# Patient Record
Sex: FEMALE | Race: WHITE | HISPANIC OR LATINO | Employment: FULL TIME | URBAN - METROPOLITAN AREA
[De-identification: names, ages, dates, MRNs, and addresses within clinical notes are randomized per-mention and may not be internally consistent; named-entity substitution may affect disease eponyms.]

---
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
## 2018-01-10 DIAGNOSIS — Z30.41 ENCOUNTER FOR SURVEILLANCE OF CONTRACEPTIVE PILLS: ICD-10-CM

## 2018-01-10 RX ORDER — NORGESTIMATE AND ETHINYL ESTRADIOL 0.25-0.035
KIT ORAL
Qty: 28 TABLET | Refills: 0 | Status: SHIPPED | OUTPATIENT
Start: 2018-01-10 | End: 2018-03-20

## 2018-01-10 NOTE — TELEPHONE ENCOUNTER
"Requested Prescriptions   Pending Prescriptions Disp Refills     SPRINTEC 28 0.25-35 MG-MCG per tablet [Pharmacy Med Name: SPRINTEC 28 DAY TABLET]   84 tablet 3    Last Written Prescription Date:  1.15.17  Last Fill Quantity: 84 tablet,  # refills: 3   [facility] Office Visit with [facility] prescribing provider:  2.15.17   Future Office Visit:      Sig: TAKE 1 TABLET BY MOUTH DAILY    Contraceptives Protocol Passed    1/10/2018  1:11 AM       Passed - Patient is not a current smoker if age is 35 or older       Passed - Recent or future visit with authorizing provider's specialty    Patient had office visit in the last year or has a visit in the next 30 days with authorizing provider.  See \"Patient Info\" tab in inbasket, or \"Choose Columns\" in Meds & Orders section of the refill encounter.              Passed - No active pregnancy on record       Passed - No positive pregnancy test in past 12 months          "

## 2018-03-30 ENCOUNTER — OFFICE VISIT (OUTPATIENT)
Dept: FAMILY MEDICINE | Facility: CLINIC | Age: 18
End: 2018-03-30
Payer: COMMERCIAL

## 2018-03-30 VITALS
WEIGHT: 175 LBS | HEIGHT: 64 IN | HEART RATE: 105 BPM | TEMPERATURE: 98.6 F | BODY MASS INDEX: 29.88 KG/M2 | SYSTOLIC BLOOD PRESSURE: 114 MMHG | OXYGEN SATURATION: 98 % | DIASTOLIC BLOOD PRESSURE: 66 MMHG

## 2018-03-30 DIAGNOSIS — H53.8 BLURRED VISION: ICD-10-CM

## 2018-03-30 DIAGNOSIS — Z30.41 ENCOUNTER FOR SURVEILLANCE OF CONTRACEPTIVE PILLS: Primary | ICD-10-CM

## 2018-03-30 DIAGNOSIS — B36.0 TINEA VERSICOLOR: ICD-10-CM

## 2018-03-30 DIAGNOSIS — L81.9 CHANGING PIGMENTED SKIN LESION: ICD-10-CM

## 2018-03-30 LAB
GLUCOSE BLD-MCNC: 95 MG/DL (ref 70–99)
KOH PREP SPEC: NORMAL
SPECIMEN SOURCE: NORMAL

## 2018-03-30 PROCEDURE — 87220 TISSUE EXAM FOR FUNGI: CPT | Performed by: PHYSICIAN ASSISTANT

## 2018-03-30 PROCEDURE — 99214 OFFICE O/P EST MOD 30 MIN: CPT | Performed by: PHYSICIAN ASSISTANT

## 2018-03-30 PROCEDURE — 82947 ASSAY GLUCOSE BLOOD QUANT: CPT | Performed by: PHYSICIAN ASSISTANT

## 2018-03-30 PROCEDURE — 36415 COLL VENOUS BLD VENIPUNCTURE: CPT | Performed by: PHYSICIAN ASSISTANT

## 2018-03-30 RX ORDER — NORGESTIMATE AND ETHINYL ESTRADIOL 0.25-0.035
1 KIT ORAL DAILY
Qty: 84 TABLET | Refills: 3 | Status: SHIPPED | OUTPATIENT
Start: 2018-03-30 | End: 2019-02-17

## 2018-03-30 RX ORDER — KETOCONAZOLE 20 MG/G
CREAM TOPICAL DAILY
Qty: 15 G | Refills: 1 | Status: SHIPPED | OUTPATIENT
Start: 2018-03-30 | End: 2018-08-07

## 2018-03-30 NOTE — MR AVS SNAPSHOT
After Visit Summary   3/30/2018    Phoebe Morris    MRN: [id number]           Patient Information     Date Of Birth          2000        Visit Information        Provider Department      3/30/2018 8:20 AM Samantha Boyd PA-C [facility]        Today's Diagnoses     Encounter for surveillance of contraceptive pills    -  1    Blurred vision          Care Instructions    Please followup annually for medication check and a routine physical.  Be seen sooner if needed.            Follow-ups after your visit        Follow-up notes from your care team     Return in about 1 year (around 3/30/2019) for medication re-check.      Your next 10 appointments already scheduled     Apr 12, 2018  3:15 PM CDT   Office Visit with Ashley Tinoco, BERNICE [facility] ([facility])    [address]  [address]  [place] [address] [phone]           Bring a current list of meds and any records pertaining to this visit. For Physicals, please bring immunization records and any forms needing to be filled out. Please arrive 10 minutes early to complete paperwork.              Who to contact     If you have questions or need follow up information about today's clinic visit or your schedule please contact [facility] directly at [phone].  Normal or non-critical lab and imaging results will be communicated to you by MyChart, letter or phone within 4 business days after the clinic has received the results. If you do not hear from us within 7 days, please contact the clinic through MyChart or phone. If you have a critical or abnormal lab result, we will notify you by phone as soon as possible.  Submit refill requests through [employer] or call your pharmacy and they will forward the refill request to us. Please allow 3 business days for your refill to be completed.          Additional Information About Your Visit        MyChart  "Information     Dean gives you secure access to your electronic health record. If you see a primary care provider, you can also send messages to your care team and make appointments. If you have questions, please call your primary care clinic.  If you do not have a primary care provider, please call [phone] and they will assist you.        Care EveryWhere ID     This is your Care EveryWhere ID. This could be used by other organizations to access your [facility] medical records  [id number]        Your Vitals Were     Pulse Temperature Height Last Period Pulse Oximetry Breastfeeding?    105 98.6  F (37  C) (Oral) 5' 4\" (1.626 m) 03/20/2018 (Exact Date) 98% No    BMI (Body Mass Index)                   30.04 kg/m2            Blood Pressure from Last 3 Encounters:   03/30/18 114/66   12/07/17 110/62   02/15/17 116/62    Weight from Last 3 Encounters:   03/30/18 175 lb (79.4 kg) (94 %)*   12/07/17 186 lb (84.4 kg) (96 %)*   02/15/17 179 lb (81.2 kg) (95 %)*     * Growth percentiles are based on [facility] 2-20 Years data.              We Performed the Following     Glucose, whole blood          Where to get your medicines      These medications were sent to [facility] [id number] [place] [facility] [address]  [address], [place] [address]     Phone:  [phone]     norgestimate-ethinyl estradiol 0.25-35 MG-MCG per tablet          Primary Care Provider Office Phone # Fax #    Samantha Boyd PA-C [phone] [address]   [address]  [facility] [address]        Equal Access to Services     [facility]: Hadilan Hansen, tavon kyle, qaybbaudilio tomalmasam daly, dante bryant. So [facility] [phone].    ATENCIÓN: Si habla español, tiene a wilson disposición servicios gratuitos de asistencia lingüística. Llame al [phone].    We comply with applicable federal civil rights laws and [place] laws. We do not discriminate on the basis of race, " color, national origin, age, disability, sex, sexual orientation, or gender identity.            Thank you!     Thank you for choosing [facility]  for your care. Our goal is always to provide you with excellent care. Hearing back from our patients is one way we can continue to improve our services. Please take a few minutes to complete the written survey that you may receive in the mail after your visit with us. Thank you!             Your Updated Medication List - Protect others around you: Learn how to safely use, store and throw away your medicines at www.disposemymeds.org.          This list is accurate as of 3/30/18  8:58 AM.  Always use your most recent med list.                   Brand Name Dispense Instructions for use Diagnosis    norgestimate-ethinyl estradiol 0.25-35 MG-MCG per tablet    SPRINTEC 28    84 tablet    Take 1 tablet by mouth daily    Encounter for surveillance of contraceptive pills

## 2018-03-30 NOTE — PATIENT INSTRUCTIONS
Please followup annually for medication check and a routine physical.  Be seen sooner if needed.

## 2018-03-30 NOTE — NURSING NOTE
"Chief Complaint   Patient presents with     Recheck Medication       Initial /66 (BP Location: Right arm, Patient Position: Chair, Cuff Size: Adult Large)  Pulse 105  Temp 98.6  F (37  C) (Oral)  Ht 5' 4\" (1.626 m)  Wt 175 lb (79.4 kg)  LMP 03/20/2018 (Exact Date)  SpO2 98%  Breastfeeding? No  BMI 30.04 kg/m2 Estimated body mass index is 30.04 kg/(m^2) as calculated from the following:    Height as of this encounter: 5' 4\" (1.626 m).    Weight as of this encounter: 175 lb (79.4 kg).  Medication Reconciliation: complete   Csaba Mlnarik CMA    "

## 2018-03-30 NOTE — PROGRESS NOTES
"  SUBJECTIVE:                                                    Phoebe Morris is a 17 year old female who presents to clinic today for the following health issues:      Medication Followup of Sprintec    Taking Medication as prescribed: yes    Side Effects:  None    Medication Helping Symptoms:  Yes - cramps are better  Last menses she was very emotional - crying over anything. Did have added stress with school.    x2 weeks - Has blurred vision off and on happens in class and did happen driving yesterday.   Happens when wearing contacts or glasses. Last eye exam 07/2017. Prescription is .5.  Has to close eyes and refocus.   Wants glucose checked - father and sister have diabetes.  Has concussion 11/2016    Patient would like her glucose checked, as her dad and sister have diabetes.  Also, she has had some trouble with transient blurry vision.  She does wear contacts and glasses and reports that the blurriness improves with a lot of blinking.  She is scheduled to see the eye doctor tomorrow at noon.      She is here for a birth control renewal.  She says she is doing well on the pill and does not have concerns.  She denies getting migraines with auras.  She is not a cigarette smoker or tobacco/nicoteine user.         Problem list and histories reviewed & adjusted, as indicated.  Additional history: as documented      ROS:  Constitutional, HEENT, cardiovascular, pulmonary, GI, , musculoskeletal, neuro, skin, endocrine and psych systems are negative, except as otherwise noted.    OBJECTIVE:                                                    /66 (BP Location: Right arm, Patient Position: Chair, Cuff Size: Adult Large)  Pulse 105  Temp 98.6  F (37  C) (Oral)  Ht 5' 4\" (1.626 m)  Wt 175 lb (79.4 kg)  LMP 03/20/2018 (Exact Date)  SpO2 98%  Breastfeeding? No  BMI 30.04 kg/m2  Body mass index is 30.04 kg/(m^2).  GENERAL: healthy, alert and no distress  EYES: Eyes grossly normal to inspection, PERRL and " conjunctivae and sclerae normal  RESP: lungs clear to auscultation - no rales, rhonchi or wheezes  BREAST: normal without masses, tenderness or nipple discharge and no palpable axillary masses or adenopathy  CV: regular rate and rhythm, normal S1 S2, no S3 or S4, no murmur, click or rub, no peripheral edema and peripheral pulses strong  MS: no gross musculoskeletal defects noted, no edema  NEURO: Normal strength and tone, mentation intact and speech normal  PSYCH: mentation appears normal, affect normal/bright  SKIN:  Hypopigmented lesions on the center chest between breasts.      Diagnostic Test Results:  Fasting Glucose - Pending     ASSESSMENT/PLAN:                                                      Phoebe was seen today for recheck medication.    Diagnoses and all orders for this visit:    Encounter for surveillance of contraceptive pills  -     norgestimate-ethinyl estradiol (SPRINTEC 28) 0.25-35 MG-MCG per tablet; Take 1 tablet by mouth daily    Blurred vision  -     Glucose, whole blood    Changing pigmented skin lesion  -     KOH prep (skin, hair or nails only)    Tinea versicolor  -     ketoconazole (NIZORAL) 2 % cream; Apply topically daily      - Patient is doing well on the birth control.  Will continue that as prescribed.   - She will see the eye doctor tomorrow as scheduled.    - Patient has been advised to try re-wetting drops such as Thera tears as the blurriness may be from eye dryness, as she reports it improves with a lot of blinking.    - Will have patient try Ketoconazole cream for the treatment of Tinea Versicolor.      -- I have discussed the patient's diagnosis, and my plan of treatment with the patient and/or family. Patient is aware to followup if symptoms do not improve.  Patient has been advised to be seen sooner or seek more immediate care if symptoms change or worsen.  Patient agrees with and understands the plan today.     See Patient Instructions:  Please followup annually for  medication check and a routine physical.  Be seen sooner if needed.          Samantha Boyd PA-C    [facility] [place]

## 2018-04-02 NOTE — PROGRESS NOTES
Nima Sandhu ,    The results from your recent lab work are within normal limits.    -Glucose (diabetic screening test) is normal.      Thank you for choosing [facility] for your health care needs,      Samantha Boyd PA-C

## 2018-04-12 ENCOUNTER — OFFICE VISIT (OUTPATIENT)
Dept: FAMILY MEDICINE | Facility: CLINIC | Age: 18
End: 2018-04-12
Payer: COMMERCIAL

## 2018-04-12 VITALS — BODY MASS INDEX: 30.4 KG/M2 | TEMPERATURE: 98.4 F | WEIGHT: 177.1 LBS

## 2018-04-12 DIAGNOSIS — Z71.84 TRAVEL ADVICE ENCOUNTER: Primary | ICD-10-CM

## 2018-04-12 DIAGNOSIS — Z23 NEED FOR VACCINATION: ICD-10-CM

## 2018-04-12 PROCEDURE — 99403 PREV MED CNSL INDIV APPRX 45: CPT | Mod: 25 | Performed by: NURSE PRACTITIONER

## 2018-04-12 PROCEDURE — 90691 TYPHOID VACCINE IM: CPT | Mod: GA | Performed by: NURSE PRACTITIONER

## 2018-04-12 PROCEDURE — 90717 YELLOW FEVER VACCINE SUBQ: CPT | Mod: GA | Performed by: NURSE PRACTITIONER

## 2018-04-12 PROCEDURE — 90472 IMMUNIZATION ADMIN EACH ADD: CPT | Mod: GA | Performed by: NURSE PRACTITIONER

## 2018-04-12 PROCEDURE — 90471 IMMUNIZATION ADMIN: CPT | Mod: GA | Performed by: NURSE PRACTITIONER

## 2018-04-12 RX ORDER — ACETAZOLAMIDE 125 MG/1
TABLET ORAL
Qty: 20 TABLET | Refills: 0 | Status: SHIPPED | OUTPATIENT
Start: 2018-04-12 | End: 2018-08-07

## 2018-04-12 RX ORDER — ATOVAQUONE AND PROGUANIL HYDROCHLORIDE 250; 100 MG/1; MG/1
1 TABLET, FILM COATED ORAL DAILY
Qty: 15 TABLET | Refills: 0 | Status: SHIPPED | OUTPATIENT
Start: 2018-04-12 | End: 2018-08-07

## 2018-04-12 RX ORDER — AZITHROMYCIN 500 MG/1
500 TABLET, FILM COATED ORAL DAILY
Qty: 3 TABLET | Refills: 0 | Status: SHIPPED | OUTPATIENT
Start: 2018-04-12 | End: 2018-04-15

## 2018-04-12 NOTE — PROGRESS NOTES
Nurse Note      Itinerary:  [place]      Departure Date: 5/5/2018      Return Date: 5/19/2018      Length of Trip 2 weeks      Reason for Travel: School trip           Urban or rural: Both      Accommodations: Hotel        IMMUNIZATION HISTORY  Have you received any immunizations within the past 4 weeks?  No  Have you ever fainted from having your blood drawn or from an injection?  No  Have you ever had a fever reaction to vaccination?  No  Have you ever had any bad reaction or side effect from any vaccination?  No  Have you ever had hepatitis A or B vaccine?  Yes  Do you live (or work closely) with anyone who has AIDS, an AIDS-like condition, any other immune disorder or who is on chemotherapy for cancer?  No  Do you have a family history of immunodeficiency?  No  Have you received any injection of immune globulin or any blood products during the past 12 months?  No    Patient roomed by KARLA Garces  Phoebe Morris is a 17 year old female seen today alone for counsultation for international travel to [place] for Study abroad.  Patient will be departing in  3 week(s) and staying for   2 week(s) and  traveling with school group.  ([facility] )    Patient itinerary :  will begin in [facility] [place] 5th - May 8th) > [place] 5/8 -9-> [place] > 05/11/ [facility] for 3 days 05/13/18 [place] 05/14 [place] > [place] 05/14/-05/17 > [place]  1 day > [place]  region of [place] which presents risk for Malaria, Yellow Fever, Dengue Fever, Chikungungya, Zika, Schistosomiasis, Rabies, food borne illnesses, motor vehicle accidents and Typhoid. exposure.      Patient's activities will include sightseeing, jungle and high altitude exposure.    Patient's country of birth is [place]    Special medical concerns: none  Pre-travel questionnaire was completed by patient and reviewed by provider.     Vitals: Temp 98.4  F (36.9  C) (Oral)  Wt 177 lb 1.6 oz (80.3 kg)  LMP 03/20/2018 (Exact Date)  Breastfeeding? No  BMI 30.4 kg/m2  BMI= Body mass  index is 30.4 kg/(m^2).    EXAM:  General:  Well-nourished, well-developed in no acute distress.  Appears to be stated age, interacts appropriately and expresses understanding of information given to patient.    Current Outpatient Prescriptions   Medication Sig Dispense Refill     norgestimate-ethinyl estradiol (SPRINTEC 28) 0.25-35 MG-MCG per tablet Take 1 tablet by mouth daily 84 tablet 3     ketoconazole (NIZORAL) 2 % cream Apply topically daily 15 g 1     Patient Active Problem List   Diagnosis   (none) - all problems resolved or deleted     No Known Allergies      Immunizations discussed include:   Hepatitis A:  Up to date  Hepatitis B: Up to date  Influenza: Up to date  Typhoid: Ordered/given today, risks, benefits and side effects reviewed  Rabies: Declined  reviewed managment of a animal bite or scratch (washing wound, seek medical care within 24 hours for post exposure prophylaxis )  Yellow Fever: Stamaril Ordered/given today - consent completed, side effects, precautions, allergies, risks discussed. Patient expressed understanding.  Frisian Encephalitis: Not indicated  Meningococcus: Up to date  Tetanus/Diphtheria: Up to date  Measles/Mumps/Rubella: Up to date  Cholera: Not needed  Polio: Up to date  Pneumococcal: Under age of 65  Varicella: Up to date  Zostavax:  Not indicated  Shingrix: Not indicated  HPV:  Up to date  TB:  Low risk    Stamaril Informed Consent    The patient was provided with a copy of the IRB-approved consent form and all questions were answered before the patient agreed to participate by signing the informed consent document.   A copy of the form was provided to the patient.    Date: April 12, 2018    Consent Version Date: 5/10/2017  Consent Obtained by:  Ashley Tinoco CNP (Lori)     HIPAA:  Yes  HIPAA Authorization Signed Date: April 12, 2018        Inclusion/Exclusion Criteria:    (Similar to Yellow Fever-VAX)      The patient met all of the following inclusion criteria in order to  be eligible for the Stamaril vaccination under this EAP (Expanded Access Investigational New Drug Program)           At increased risk for YF, including researchers, laboratory workers, vaccine production staff, and those who are traveling within 30 days to a YF-endemic region or to a country requiring proof of YF vaccination under IHRs (International Health Regulations)?       Yes     Patient is greater than or equal to 9 months of age on the day of vaccination?     Yes     Patient is greater than or equal to 18 years of age and signed and dated the Consent Forms?     Yes     Patient is < 18 years of age and parent(s)/guardian(s) signed and dated the Consent Forms?      Patient is 7 years to < 18 years of age and signed and dated the Assent form?        No Assent is required.  Patient is <7 years of age.     No      No      N/A     The patient did not meet any of the following criteria that would have excluded the patient from receiving the Stamaril vaccination under this EAP              Patient is less than 9 months of age.       No     The patient is breast-feeding and cannot stop nursing for at least 14 days after vaccination.    Note: Yellow Fever vaccine virus may be transmissible via breast milk by nursing mothers who are vaccinated during the final 2 weeks of pregnancy or post-partum.   Following transmission, infants may develop encephalitis.  The minimum time of discontinuation of breastfeeding for 14 days after vaccination is based on the expected clearance of live-attenuated vaccine virus.       No     The patient is immunosuppressed, whether congenital or idiopathic, including for example, leukemia, lymphoma, other malignancies, and patients who are receiving immunosuppressant medications (e.g. Systemic corticosteroids [greater than the standard dose of topical or inhaled steroids], alkylating drugs, antimetabolites, of other cytotoxic or immunomodulatory drugs) or radiation therapy or organ  transplantation.       No     The patient has known hypersensitivity to the active substance or to any of the excipients of Stamaril vaccine or to eggs or chicken proteins.     No     The patient is symptomatic for human immunodeficiency virus (HIV) infection     No     The patient is asymptomatic for HIV infection but accompanied by evidence of severe immune suppression    Note:  Evidence of severe immune suppression includes CD4+ T-cell counts < 200 cubic millimeters (or < 15% total lymphocytes in children aged < 6 years), or as determined by the health care provider.       No     The patient has a history of thymus dysfunction (including myasthenia gravis, thymoma, thymectomy)     No     Moderate or severe febrile illness or acute illness    Note: Participation in the EAP can be reassessed when moderate or severe febrile illness or acute illness has resolved.       No         Altitude Exposure on this trip: yes  Past tolerance to Altitude: limited    ASSESSMENT/PLAN:    ICD-10-CM    1. Travel advice encounter Z71.89 TYPHOID VACCINE, IM     C YELLOW FEVER IMMUNIZATION, LIVE, SQ (STAMARIL)     atovaquone-proguanil (MALARONE) 250-100 MG per tablet     azithromycin (ZITHROMAX) 500 MG tablet     acetaZOLAMIDE (DIAMOX) 125 MG tablet   2. Need for vaccination Z23 TYPHOID VACCINE, IM     C YELLOW FEVER IMMUNIZATION, LIVE, SQ (STAMARIL)     I have reviewed general recommendations for safe travel   including: food/water precautions, insect precautions, safer sex   practices given high prevalence of Zika, HIV and other STDs,   roadway safety. Educational materials and [employer] report provided.    Malaraia prophylaxis recommended: Malarone  Symptomatic treatment for traveler's diarrhea: azithromycin  Altitude illness prevention and treatment: Diamox prescription given with instructions on use and education provided on Acute altitude illness recognition and prevention.      Evacuation insurance advised and resources were  provided to patient.    Total visit time 45 minutes  with over 50% of time spent counseling patient as detailed above.    Ashley Tinoco CNP

## 2018-04-12 NOTE — PATIENT INSTRUCTIONS
Today April 12, 2018 you received the    Yellow Fever (YF)    Typhoid - injectable. This vaccine is valid for two years.   .    These appointments can be made as a NURSE ONLY visit.    **It is very important for the vaccinations to be given on the scheduled day(s), this helps ensure you receive the full effectiveness of the vaccine.**    Please call [facility] with any questions [phone]    Thank you for visiting [facility]

## 2018-04-12 NOTE — MR AVS SNAPSHOT
After Visit Summary   4/12/2018    Phoebe Morris    MRN: [id number]           Patient Information     Date Of Birth          2000        Visit Information        Provider Department      4/12/2018 3:15 PM Ashley Tinoco APRN [facility]        Todays Diagnoses     Travel advice encounter    -  1    Need for vaccination          Care Instructions    Today April 12, 2018 you received the    Yellow Fever (YF)    Typhoid - injectable. This vaccine is valid for two years.   .    These appointments can be made as a NURSE ONLY visit.    **It is very important for the vaccinations to be given on the scheduled day(s), this helps ensure you receive the full effectiveness of the vaccine.**    Please call [facility] with any questions [phone]    Thank you for visiting [facility]              Follow-ups after your visit        Who to contact     If you have questions or need follow up information about [facility]'s clinic visit or your schedule please contact [facility] directly at [phone].  Normal or non-critical lab and imaging results will be communicated to you by [employer], letter or phone within 4 business days after the clinic has received the results. If you do not hear from us within 7 days, please contact the clinic through [employer] or phone. If you have a critical or abnormal lab result, we will notify you by phone as soon as possible.  Submit refill requests through [employer] or call your pharmacy and they will forward the refill request to us. Please allow 3 business days for your refill to be completed.          Additional Information About Your Visit        [employer] Information     [employer] gives you secure access to your electronic health record. If you see a primary care provider, you can also send messages to your care team and make appointments. If you have questions, please call your primary care clinic.  If you do  not have a primary care provider, please call [phone] and they will assist you.        Care EveryWhere ID     This is your Care EveryWhere ID. This could be used by other organizations to access your [place] medical records  [id number]        Your Vitals Were     Temperature Last Period Breastfeeding? BMI (Body Mass Index)          98.4  F (36.9  C) (Oral) 03/20/2018 (Exact Date) No 30.4 kg/m2         Blood Pressure from Last 3 Encounters:   03/30/18 114/66   12/07/17 110/62   02/15/17 116/62    Weight from Last 3 Encounters:   04/12/18 177 lb 1.6 oz (80.3 kg) (94 %)*   03/30/18 175 lb (79.4 kg) (94 %)*   12/07/17 186 lb (84.4 kg) (96 %)*     * Growth percentiles are based on [facility] 2-20 Years data.              We Performed the Following     C YELLOW FEVER IMMUNIZATION, LIVE, SQ (STAMARIL)     TYPHOID VACCINE, IM          Today's Medication Changes          These changes are accurate as of 4/12/18  4:06 PM.  If you have any questions, ask your nurse or doctor.               Start taking these medicines.        Dose/Directions    acetaZOLAMIDE 125 MG tablet   Commonly known as:  DIAMOX   Used for:  Travel advice encounter   Started by:  Ashley Tinoco APRN CNP        Take one to two tab every 12 hours starting 24 hours prior to [place]  and continue for days   Quantity:  20 tablet   Refills:  0       atovaquone-proguanil 250-100 MG per tablet   Commonly known as:  MALARONE   Used for:  Travel advice encounter   Started by:  Ashley Tinoco APRN CNP        Dose:  1 tablet   Take 1 tablet by mouth daily Start 2 days before exposure to Malaria and continue daily till  7 days after exposure.   Quantity:  15 tablet   Refills:  0       azithromycin 500 MG tablet   Commonly known as:  ZITHROMAX   Used for:  Travel advice encounter   Started by:  Ashley Tinoco APRN CNP        Dose:  500 mg   Take 1 tablet (500 mg) by mouth daily for 3 doses Take 1 tablet a day for up to 3 days for severe diarrhea   Quantity:   3 tablet   Refills:  0            Where to get your medicines      These medications were sent to [id number] - [place], [place] - [address]  [address], [place] [address]     Phone:  [phone]     acetaZOLAMIDE 125 MG tablet    atovaquone-proguanil 250-100 MG per tablet    azithromycin 500 MG tablet                Primary Care Provider Office Phone # Fax #    Samantha Boyd PA-C [phone]       [address] [place] [address]   0  [facility] [address]        Equal Access to Services     [facility]: Hadii aad ku hadasho Soomaali, waaxda luqadaha, qaybta kaalmada adeegyada, dante sierra . So [facility] [phone].    ATENCIÓN: Si habla español, tiene a wilson disposición servicios gratuitos de asistencia lingüística. [facility] [phone].    We comply with applicable federal civil rights laws and [place] laws. We do not discriminate on the basis of race, color, national origin, age, disability, sex, sexual orientation, or gender identity.            Thank you!     Thank you for choosing [facility]  for your care. Our goal is always to provide you with excellent care. Hearing back from our patients is one way we can continue to improve our services. Please take a few minutes to complete the written survey that you may receive in the mail after your visit with us. Thank you!             Your Updated Medication List - Protect others around you: Learn how to safely use, store and throw away your medicines at www.disposemymeds.org.          This list is accurate as of 4/12/18  4:06 PM.  Always use your most recent med list.                   Brand Name Dispense Instructions for use Diagnosis    acetaZOLAMIDE 125 MG tablet    DIAMOX    20 tablet    Take one to two tab every 12 hours starting 24 hours prior to [place]  and continue for days    Travel advice encounter       atovaquone-proguanil 250-100 MG per tablet    MALARONE    15 tablet    Take 1  tablet by mouth daily Start 2 days before exposure to Malaria and continue daily till  7 days after exposure.    Travel advice encounter       azithromycin 500 MG tablet    ZITHROMAX    3 tablet    Take 1 tablet (500 mg) by mouth daily for 3 doses Take 1 tablet a day for up to 3 days for severe diarrhea    Travel advice encounter       ketoconazole 2 % cream    NIZORAL    15 g    Apply topically daily    Tinea versicolor       norgestimate-ethinyl estradiol 0.25-35 MG-MCG per tablet    SPRINTEC 28    84 tablet    Take 1 tablet by mouth daily    Encounter for surveillance of contraceptive pills

## 2018-08-07 ENCOUNTER — OFFICE VISIT (OUTPATIENT)
Dept: FAMILY MEDICINE | Facility: CLINIC | Age: 18
End: 2018-08-07
Payer: COMMERCIAL

## 2018-08-07 ENCOUNTER — TELEPHONE (OUTPATIENT)
Dept: FAMILY MEDICINE | Facility: CLINIC | Age: 18
End: 2018-08-07

## 2018-08-07 VITALS
OXYGEN SATURATION: 99 % | WEIGHT: 173 LBS | DIASTOLIC BLOOD PRESSURE: 70 MMHG | SYSTOLIC BLOOD PRESSURE: 118 MMHG | HEIGHT: 64 IN | HEART RATE: 91 BPM | BODY MASS INDEX: 29.53 KG/M2 | TEMPERATURE: 98 F

## 2018-08-07 DIAGNOSIS — Z00.00 ROUTINE GENERAL MEDICAL EXAMINATION AT A HEALTH CARE FACILITY: Primary | ICD-10-CM

## 2018-08-07 PROCEDURE — 99395 PREV VISIT EST AGE 18-39: CPT | Performed by: PHYSICIAN ASSISTANT

## 2018-08-07 NOTE — PROGRESS NOTES
SUBJECTIVE:   CC: Phoebe Morris is an 17 year old woman who presents for preventive health visit.     Healthy Habits:    Do you get at least three servings of calcium containing foods daily (dairy, green leafy vegetables, etc.)? yes    Amount of exercise or daily activities, outside of work: 7 day(s) per week - 3 days a week with  other days at least 30 minutes    Problems taking medications regularly No    Medication side effects: No    Have you had an eye exam in the past two years? yes    Do you see a dentist twice per year? yes    Do you have sleep apnea, excessive snoring or daytime drowsiness?no      PROBLEMS TO ADD ON...  Patient needs paperwork filled out for [employer].    Needs to have a body fat measurement done as she exceeds the BMI weight requirments listed on the form.      Today's PHQ-2 Score: 0-0  PHQ-2 ( 1999 Pfizer) 12/7/2017   Q1: Little interest or pleasure in doing things 0   Q2: Feeling down, depressed or hopeless 0   PHQ-2 Score 0       Abuse: Current or Past(Physical, Sexual or Emotional)- No  Do you feel safe in your environment - Yes    Social History   Substance Use Topics     Smoking status: Never Smoker     Smokeless tobacco: Never Used     Alcohol use No     If you drink alcohol do you typically have >3 drinks per day or >7 drinks per week? Not Applicable                     Reviewed orders with patient.  Reviewed health maintenance and updated orders accordingly - Yes    Mammogram not appropriate for this patient based on age.    Pertinent mammograms are reviewed under the imaging tab.  History of abnormal Pap smear: NO - under age 21, PAP not appropriate for age     Reviewed and updated as needed this visit by clinical staff  Tobacco  Allergies  Meds  Med Hx  Surg Hx  Fam Hx  Soc Hx        Reviewed and updated as needed this visit by Provider            ROS:  CONSTITUTIONAL: NEGATIVE for fever, chills, change in weight  INTEGUMENTARU/SKIN: NEGATIVE for  "worrisome rashes, moles or lesions  EYES: NEGATIVE for vision changes or irritation  ENT: NEGATIVE for ear, mouth and throat problems  RESP: NEGATIVE for significant cough or SOB  BREAST: NEGATIVE for masses, tenderness or discharge  CV: NEGATIVE for chest pain, palpitations or peripheral edema  GI: NEGATIVE for nausea, abdominal pain, heartburn, or change in bowel habits  : NEGATIVE for unusual urinary or vaginal symptoms. Periods are regular.  MUSCULOSKELETAL: NEGATIVE for significant arthralgias or myalgia  NEURO: NEGATIVE for weakness, dizziness or paresthesias  PSYCHIATRIC: NEGATIVE for changes in mood or affect    OBJECTIVE:   /70 (BP Location: Left arm, Patient Position: Chair, Cuff Size: Adult Large)  Pulse 91  Temp 98  F (36.7  C) (Oral)  Ht 5' 3.75\" (1.619 m)  Wt 173 lb (78.5 kg)  LMP 08/06/2018 (Exact Date)  SpO2 99%  Breastfeeding? No  BMI 29.93 kg/m2  EXAM:  GENERAL: healthy, alert and no distress  EYES: Eyes grossly normal to inspection, PERRL and conjunctivae and sclerae normal  HENT: ear canals and TM's normal, nose and mouth without ulcers or lesions  NECK: no adenopathy, no asymmetry, masses, or scars and thyroid normal to palpation  RESP: lungs clear to auscultation - no rales, rhonchi or wheezes  BREAST: normal without masses, tenderness or nipple discharge and no palpable axillary masses or adenopathy  CV: regular rate and rhythm, normal S1 S2, no S3 or S4, no murmur, click or rub, no peripheral edema and peripheral pulses strong  ABDOMEN: soft, nontender, no hepatosplenomegaly, no masses and bowel sounds normal  MS: no gross musculoskeletal defects noted, no edema  SKIN: no suspicious lesions or rashes  NEURO: Normal strength and tone, mentation intact and speech normal  PSYCH: mentation appears normal, affect normal/bright    Diagnostic Test Results:  none     ASSESSMENT/PLAN:   1. Routine general medical examination at a health care facility    -Paperwork brought into the " "clinic today by the patient for entry to [employer] cannot be completed today.  This is because the patient's current BMI status exceeds the weight restrictions on the form.  Therefore the patient needs to be followed up with a body fat composition test which is not done at our clinic.  Patient has been advised that she should discuss with her guidance counselor to find out where she can have this done.  She can bring the form in with the body fat composition testing, as well to have the form completed and signed once the body fat composition testing has been done.  She has been advised to set this is a phone appointment for paperwork.    -- I have discussed the patient's diagnosis, and my plan of treatment with the patient and/or family. Patient is aware to followup if symptoms do not improve.  Patient has been advised to be seen sooner or seek more immediate care if symptoms change or worsen.  Patient agrees with and understands the plan today.     COUNSELING:   Reviewed preventive health counseling, as reflected in patient instructions    BP Readings from Last 1 Encounters:   08/07/18 118/70     Estimated body mass index is 29.93 kg/(m^2) as calculated from the following:    Height as of this encounter: 5' 3.75\" (1.619 m).    Weight as of this encounter: 173 lb (78.5 kg).      Weight management plan: Discussed healthy diet and exercise guidelines and patient will follow up in 12 months in clinic to re-evaluate.     reports that she has never smoked. She has never used smokeless tobacco.      Counseling Resources:  ATP IV Guidelines  Pooled Cohorts Equation Calculator  Breast Cancer Risk Calculator  FRAX Risk Assessment  ICSI Preventive Guidelines  Dietary Guidelines for Americans, 2010  USDA's MyPlate  ASA Prophylaxis  Lung CA Screening    Samantha Boyd PA-C  [facility]  "

## 2018-08-07 NOTE — MR AVS SNAPSHOT
After Visit Summary   8/7/2018    Phoebe Morris    MRN: [id number]           Patient Information     Date Of Birth          2000        Visit Information        Provider Department      8/7/2018 10:20 AM Samantha Boyd PA-C [facility]        Today's Diagnoses     Routine general medical examination at a health care facility    -  1      Care Instructions      Preventive Health Recommendations  Female Ages 18 to 20     Yearly exam:     See your health care provider every year in order to  o Review health changes.   o Discuss preventive care.    o Review your medicines if your doctor has prescribed any.      You should be tested each year for STDs (sexually transmitted diseases).       After age 20, talk to your provider about how often you should have cholesterol testing.      If you are at risk for diabetes, you should have a diabetes test (fasting glucose).     Shots:     Get a flu shot each year.     Get a tetanus shot every 10 years.     Consider getting the shot (vaccine) that prevents cervical cancer (Gardasil).    Nutrition:     Eat at least 5 servings of fruits and vegetables each day.    Eat whole-grain bread, whole-wheat pasta and brown rice instead of white grains and rice.    Get adequate Calcium and Vitamin D.     Lifestyle    Exercise at least 150 minutes a week each week (30 minutes a day, 5 days a week). This will help you control your weight and prevent disease.    No smoking.     Wear sunscreen to prevent skin cancer.    See your dentist every six months for an exam and cleaning.          Follow-ups after your visit        Who to contact     If you have questions or need follow up information about today's clinic visit or your schedule please contact [facility] directly at [phone].  Normal or non-critical lab and imaging results will be communicated to you by MyChart, letter or phone within 4 business days after the clinic has  "received the results. If you do not hear from us within 7 days, please contact the clinic through [employer] or phone. If you have a critical or abnormal lab result, we will notify you by phone as soon as possible.  Submit refill requests through [employer] or call your pharmacy and they will forward the refill request to us. Please allow 3 business days for your refill to be completed.          Additional Information About Your Visit        CS [employer] Information     [employer] gives you secure access to your electronic health record. If you see a primary care provider, you can also send messages to your care team and make appointments. If you have questions, please call your primary care clinic.  If you do not have a primary care provider, please call [phone] and they will assist you.        Care EveryWhere ID     This is your Care EveryWhere ID. This could be used by other organizations to access your [facility] medical records  [id number]        Your Vitals Were     Pulse Temperature Height Last Period Pulse Oximetry Breastfeeding?    91 98  F (36.7  C) (Oral) 5' 3.75\" (1.619 m) 08/06/2018 (Exact Date) 99% No    BMI (Body Mass Index)                   29.93 kg/m2            Blood Pressure from Last 3 Encounters:   08/07/18 118/70   03/30/18 114/66   12/07/17 110/62    Weight from Last 3 Encounters:   08/07/18 173 lb (78.5 kg) (93 %)*   04/12/18 177 lb 1.6 oz (80.3 kg) (94 %)*   03/30/18 175 lb (79.4 kg) (94 %)*     * Growth percentiles are based on CDC 2-20 Years data.              Today, you had the following     No orders found for display         Today's Medication Changes          These changes are accurate as of 8/7/18 11:59 PM.  If you have any questions, ask your nurse or doctor.               Stop taking these medicines if you haven't already. Please contact your care team if you have questions.     ketoconazole 2 % cream   Commonly known as:  NIZORAL   Stopped by:  Samantha Boyd PA-C                    " Primary Care Provider Office Phone # Fax #    Samantha Boyd PA-C [phone]       [address] [place] [address]        Equal Access to Services     ANDRAE MORA : Cedrick rich rigoo Soomaali, waaxda luqadaha, qaybta kaalmada adeegyada, dante hunter laReneejulio cesar bryant. So [facility] [phone].    ATENCIÓN: Si habla español, tiene a wilson disposición servicios gratuitos de asistencia lingüística. Llame al [phone].    We comply with applicable federal civil rights laws and [place] laws. We do not discriminate on the basis of race, color, national origin, age, disability, sex, sexual orientation, or gender identity.            Thank you!     Thank you for choosing [facility]  for your care. Our goal is always to provide you with excellent care. Hearing back from our patients is one way we can continue to improve our services. Please take a few minutes to complete the written survey that you may receive in the mail after your visit with us. Thank you!             Your Updated Medication List - Protect others around you: Learn how to safely use, store and throw away your medicines at www.disposemymeds.org.          This list is accurate as of 8/7/18 11:59 PM.  Always use your most recent med list.                   Brand Name Dispense Instructions for use Diagnosis    norgestimate-ethinyl estradiol 0.25-35 MG-MCG per tablet    SPRINTEC 28    84 tablet    Take 1 tablet by mouth daily    Encounter for surveillance of contraceptive pills

## 2018-08-07 NOTE — TELEPHONE ENCOUNTER
Reason for Call:  Same Day Appointment, Requested Provider:  VLADISLAV Cruz (Aug 2016)    PCP: Samantha Boyd    Reason for visit: Pt requesting phone visit. OK'd by KR    Duration of symptoms: ongoing    Have you been treated for this in the past? Yes    Additional comments: Pt will be calling back to make a phone visit with Samantha NATARAJAN.  It needs to be either first thing in morning or first appt after lunch, per Samantha. The next open green slot is just fine.    Can we leave a detailed message on this number? YES    Phone number patient can be reached at: Cell number on file:    Telephone Information:   Mobile [phone]       Best Time: any    Call taken on 8/7/2018 at 5:20 PM by Fabienne Snowden

## 2018-08-24 ENCOUNTER — TELEPHONE (OUTPATIENT)
Dept: FAMILY MEDICINE | Facility: CLINIC | Age: 18
End: 2018-08-24

## 2018-08-24 NOTE — TELEPHONE ENCOUNTER
Date Forms was received: August 24, 2018    Forms received by: Patient Drop Off    Last office visit: 08/07/2018    Purpose of Form:  [employer] - email about BMI    When the form is due:  asap    How the form needs to be returned for patient:  Patient  -- Mother Isabelle - has consent to communicate on file - call [phone] - [place] [facility]    [place] currently placed  North side - Samantha's desk Bin on R side

## 2018-08-31 NOTE — TELEPHONE ENCOUNTER
Paperwork done today.  Please photocopy for scan to chart and then notify patient it is ready for .      Thank you.

## 2019-01-15 ENCOUNTER — ALLIED HEALTH/NURSE VISIT (OUTPATIENT)
Dept: NURSING | Facility: CLINIC | Age: 19
End: 2019-01-15
Payer: COMMERCIAL

## 2019-01-15 DIAGNOSIS — Z23 NEED FOR VACCINATION: ICD-10-CM

## 2019-01-15 DIAGNOSIS — Z23 NEED FOR PROPHYLACTIC VACCINATION AND INOCULATION AGAINST INFLUENZA: Primary | ICD-10-CM

## 2019-01-15 PROCEDURE — 90651 9VHPV VACCINE 2/3 DOSE IM: CPT

## 2019-01-15 PROCEDURE — 90686 IIV4 VACC NO PRSV 0.5 ML IM: CPT

## 2019-01-15 PROCEDURE — 90734 MENACWYD/MENACWYCRM VACC IM: CPT

## 2019-01-15 PROCEDURE — 90471 IMMUNIZATION ADMIN: CPT

## 2019-01-15 PROCEDURE — 90472 IMMUNIZATION ADMIN EACH ADD: CPT

## 2019-01-15 NOTE — PROGRESS NOTES
Injectable Influenza Immunization Documentation    1.  Is the person to be vaccinated sick today?   No    2. Does the person to be vaccinated have an allergy to a component   of the vaccine?   No  Egg Allergy Algorithm Link    3. Has the person to be vaccinated ever had a serious reaction   to influenza vaccine in the past?   No    4. Has the person to be vaccinated ever had Guillain-Barré syndrome?   No    Form completed by Aretha Wells MA      Screening Questionnaire for Adult Immunization    Are you sick today?   No   Do you have allergies to medications, food, a vaccine component or latex?   No   Have you ever had a serious reaction after receiving a vaccination?   No   Do you have a long-term health problem with heart disease, lung disease, asthma, kidney disease, metabolic disease (e.g. diabetes), anemia, or other blood disorder?   No   Do you have cancer, leukemia, HIV/AIDS, or any other immune system problem?   No   In the past 3 months, have you taken medications that affect  your immune system, such as prednisone, other steroids, or anticancer drugs; drugs for the treatment of rheumatoid arthritis, Crohn s disease, or psoriasis; or have you had radiation treatments?   No   Have you had a seizure, or a brain or other nervous system problem?   No   During the past year, have you received a transfusion of blood or blood     products, or been given immune (gamma) globulin or antiviral drug?   No   For women: Are you pregnant or is there a chance you could become        pregnant during the next month?   No   Have you received any vaccinations in the past 4 weeks?   No     Immunization questionnaire answers were all negative.        Injection of Menactra, Flu, and HPV given by Aretha Wells. Patient instructed to remain in clinic for 15 minutes afterwards, and to report any adverse reaction to me immediately.       Screening performed by Aretha Wells on 1/15/2019 at 3:36 PM.

## 2019-02-17 DIAGNOSIS — Z30.41 ENCOUNTER FOR SURVEILLANCE OF CONTRACEPTIVE PILLS: ICD-10-CM

## 2019-02-18 NOTE — TELEPHONE ENCOUNTER
"Requested Prescriptions   Pending Prescriptions Disp Refills     SPRINTEC 28 0.25-35 MG-MCG tablet [Pharmacy Med Name: SPRINTEC 28 DAY TABLET] 84 tablet 3        Last Written Prescription Date:  3.30.18  Last Fill Quantity: 84,  # refills: 3   Last Office Visit: 8/7/2018   Future Office Visit:      Sig: TAKE 1 TABLET BY MOUTH DAILY    Contraceptives Protocol Passed - 2/17/2019 11:40 AM       Passed - Patient is not a current smoker if age is 35 or older       Passed - Recent (12 mo) or future (30 days) visit within the authorizing provider's specialty    Patient had office visit in the last 12 months or has a visit in the next 30 days with authorizing provider or within the authorizing provider's specialty.  See \"Patient Info\" tab in inbasket, or \"Choose Columns\" in Meds & Orders section of the refill encounter.             Passed - Medication is active on med list       Passed - No active pregnancy on record       Passed - No positive pregnancy test in past 12 months          "

## 2019-02-19 RX ORDER — NORGESTIMATE AND ETHINYL ESTRADIOL 0.25-0.035
KIT ORAL
Qty: 84 TABLET | Refills: 1 | Status: SHIPPED | OUTPATIENT
Start: 2019-02-19 | End: 2019-07-15

## 2019-07-15 DIAGNOSIS — Z30.41 ENCOUNTER FOR SURVEILLANCE OF CONTRACEPTIVE PILLS: ICD-10-CM

## 2019-07-15 NOTE — TELEPHONE ENCOUNTER
"Requested Prescriptions   Pending Prescriptions Disp Refills     SPRINTEC 28 0.25-35 MG-MCG tablet [Pharmacy Med Name: SPRINTEC 28 DAY TABLET]          Last Written Prescription Date:  2.19.19  Last Fill Quantity: 84 tablet,  # refills: 1   Last office visit: 8/7/2018 with prescribing provider:  VLADISLAV Marshall             Future Office Visit:       84 tablet 1     Sig: TAKE 1 TABLET BY MOUTH EVERY DAY       Contraceptives Protocol Passed - 7/15/2019  1:34 AM        Passed - Patient is not a current smoker if age is 35 or older        Passed - Recent (12 mo) or future (30 days) visit within the authorizing provider's specialty     Patient had office visit in the last 12 months or has a visit in the next 30 days with authorizing provider or within the authorizing provider's specialty.  See \"Patient Info\" tab in inbasket, or \"Choose Columns\" in Meds & Orders section of the refill encounter.              Passed - Medication is active on med list        Passed - No active pregnancy on record        Passed - No positive pregnancy test in past 12 months        "

## 2019-07-15 NOTE — LETTER
[facility]  [address] [place] [address]  [phone]  July 23, 2019    Phoebe Morris  [address] [place] [address]    Dear Phoebe,    I care about your health and have reviewed your health plan. I have reviewed your medical conditions, medication list, and lab results and am making recommendations based on this review, to better manage your health.    You are in particular need of attention regarding:  -Wellness (Physical) Visit     I am recommending that you:  -schedule a WELLNESS (Physical) APPOINTMENT with me.   I will check fasting labs the same day - nothing to eat except water and meds for 8-10 hours prior.      Here is a list of Health Maintenance topics that are due now or due soon:  Health Maintenance Due   Topic Date Due     Chlamydia Screening  2000     HIV Screening  12/23/2013     PHQ-2  01/01/2019     Preventive Care Visit  08/07/2019       Please call us at [phone] (or use [employer]) to address the above recommendations.     Thank you for trusting [facility] and we appreciate the opportunity to serve you.  We look forward to supporting your healthcare needs in the future.    Healthy Regards,    [facility] [place]

## 2019-07-16 RX ORDER — NORGESTIMATE AND ETHINYL ESTRADIOL 0.25-0.035
KIT ORAL
Qty: 84 TABLET | Refills: 0 | Status: SHIPPED | OUTPATIENT
Start: 2019-07-16 | End: 2019-09-03

## 2019-07-16 NOTE — TELEPHONE ENCOUNTER
A 90 day supply is given, patient is due for an office visit.  Please call to  assist the patient in scheduling an appointment.  ELADIO Jacobsen, RN  [employer] [facility]

## 2019-07-16 NOTE — TELEPHONE ENCOUNTER
Left non-detailed message for patient to call back.  Please schedule follow up when patient calls back.  (see previous notes for details)    Thanks Lakia

## 2019-07-23 NOTE — TELEPHONE ENCOUNTER
Second attempt - Left non-detailed message for patient to call back.  Please schedule follow up when patient calls back.  (see previous notes for details)    Letter sent.  Closing encounter.    Alice Shetty

## 2019-08-18 DIAGNOSIS — Z30.41 ENCOUNTER FOR SURVEILLANCE OF CONTRACEPTIVE PILLS: ICD-10-CM

## 2019-08-19 NOTE — TELEPHONE ENCOUNTER
"Requested Prescriptions   Pending Prescriptions Disp Refills     SPRINTEC 28 0.25-35 MG-MCG tablet [Pharmacy Med Name: SPRINTEC 28 DAY TABLET] 84 tablet 0     Sig: TAKE 1 TABLET BY MOUTH EVERY DAY. DUE FOR APPT.       Last Written Prescription Date:  7/16/2019  Last Fill Quantity: 84,  # refills: 0   Last office visit: 8/7/2018 with prescribing provider:     Future Office Visit:          Contraceptives Protocol Failed - 8/18/2019  2:02 PM        Failed - Recent (12 mo) or future (30 days) visit within the authorizing provider's specialty     Patient had office visit in the last 12 months or has a visit in the next 30 days with authorizing provider or within the authorizing provider's specialty.  See \"Patient Info\" tab in inbasket, or \"Choose Columns\" in Meds & Orders section of the refill encounter.              Passed - Patient is not a current smoker if age is 35 or older        Passed - Medication is active on med list        Passed - No active pregnancy on record        Passed - No positive pregnancy test in past 12 months        "

## 2019-08-20 RX ORDER — NORGESTIMATE AND ETHINYL ESTRADIOL 0.25-0.035
KIT ORAL
Qty: 84 TABLET | Refills: 0 | OUTPATIENT
Start: 2019-08-20

## 2019-08-22 NOTE — TELEPHONE ENCOUNTER
Patient address in [place].  Unsure if she is still seen in the clinic.  Left non-detailed message for patient to call back.  Please schedule follow up when patient calls back.  (see previous notes for details)    Alice Shetty

## 2019-08-24 ENCOUNTER — MYC REFILL (OUTPATIENT)
Dept: FAMILY MEDICINE | Facility: CLINIC | Age: 19
End: 2019-08-24

## 2019-08-24 DIAGNOSIS — Z30.41 ENCOUNTER FOR SURVEILLANCE OF CONTRACEPTIVE PILLS: ICD-10-CM

## 2019-08-24 RX ORDER — NORGESTIMATE AND ETHINYL ESTRADIOL 0.25-0.035
1 KIT ORAL DAILY
Qty: 84 TABLET | Refills: 0 | Status: CANCELLED | OUTPATIENT
Start: 2019-08-24

## 2019-08-30 DIAGNOSIS — Z30.41 ENCOUNTER FOR SURVEILLANCE OF CONTRACEPTIVE PILLS: ICD-10-CM

## 2019-08-31 NOTE — TELEPHONE ENCOUNTER
"Requested Prescriptions   Pending Prescriptions Disp Refills     SPRINTEC 28 0.25-35 MG-MCG tablet [Pharmacy Med Name: SPRINTEC 28 DAY  Last Written Prescription Date:  7/16/19  Last Fill Quantity: 84,  # refills: 0   Last Office Visit: 8/7/2018   Future Office Visit:    Next 5 appointments (look out 90 days)    Sep 03, 2019 11:00 AM CDT  Dean Garcia with BERNICE Mott CNP  [facility] [address] [facility] [id number] [phone]          TABLET] 84 tablet 0     Sig: TAKE 1 TABLET BY MOUTH EVERY DAY. DUE FOR APPT.       Contraceptives Protocol Failed - 8/30/2019  5:27 PM        Failed - Recent (12 mo) or future (30 days) visit within the authorizing provider's specialty     Patient had office visit in the last 12 months or has a visit in the next 30 days with authorizing provider or within the authorizing provider's specialty.  See \"Patient Info\" tab in inbasket, or \"Choose Columns\" in Meds & Orders section of the refill encounter.              Passed - Patient is not a current smoker if age is 35 or older        Passed - Medication is active on med list        Passed - No active pregnancy on record        Passed - No positive pregnancy test in past 12 months          "

## 2019-09-03 ENCOUNTER — OFFICE VISIT (OUTPATIENT)
Dept: FAMILY MEDICINE | Facility: CLINIC | Age: 19
End: 2019-09-03
Payer: COMMERCIAL

## 2019-09-03 VITALS
OXYGEN SATURATION: 99 % | BODY MASS INDEX: 28.51 KG/M2 | DIASTOLIC BLOOD PRESSURE: 74 MMHG | SYSTOLIC BLOOD PRESSURE: 102 MMHG | HEIGHT: 64 IN | WEIGHT: 167 LBS | HEART RATE: 102 BPM | TEMPERATURE: 98.7 F

## 2019-09-03 DIAGNOSIS — Z30.41 ENCOUNTER FOR SURVEILLANCE OF CONTRACEPTIVE PILLS: Primary | ICD-10-CM

## 2019-09-03 DIAGNOSIS — Z11.3 ROUTINE SCREENING FOR STI (SEXUALLY TRANSMITTED INFECTION): ICD-10-CM

## 2019-09-03 PROCEDURE — 99213 OFFICE O/P EST LOW 20 MIN: CPT | Performed by: NURSE PRACTITIONER

## 2019-09-03 PROCEDURE — 87491 CHLMYD TRACH DNA AMP PROBE: CPT | Performed by: NURSE PRACTITIONER

## 2019-09-03 PROCEDURE — 87591 N.GONORRHOEAE DNA AMP PROB: CPT | Performed by: NURSE PRACTITIONER

## 2019-09-03 RX ORDER — NORGESTIMATE AND ETHINYL ESTRADIOL 0.25-0.035
1 KIT ORAL DAILY
Qty: 180 TABLET | Refills: 1 | Status: SHIPPED | OUTPATIENT
Start: 2019-09-03 | End: 2020-09-11

## 2019-09-03 ASSESSMENT — MIFFLIN-ST. JEOR: SCORE: 1508.54

## 2019-09-03 NOTE — PROGRESS NOTES
"   SUBJECTIVE:   CC: Phoebe Morris is an 18 year old woman who presents for preventive health visit.     Healthy Habits:    Do you get at least three servings of calcium containing foods daily (dairy, green leafy vegetables, etc.)? { :441353::\"yes\"}    Amount of exercise or daily activities, outside of work: { :656560}    Problems taking medications regularly { :079047::\"No\"}    Medication side effects: { :878938::\"No\"}    Have you had an eye exam in the past two years? { :660493}    Do you see a dentist twice per year? { :395034}    Do you have sleep apnea, excessive snoring or daytime drowsiness?{ :943643}  {Outside tests to abstract? :648747}    {additional problems to add (Optional):739991}    Today's PHQ-2 Score:   PHQ-2 ( 1999 Pf[employer]) 12/7/2017   Q1: Little interest or pleasure in doing things 0   Q2: Feeling down, depressed or hopeless 0   PHQ-2 Score 0     {PHQ-2 LOOK IN ASSESSMENTS (Optional) :882625}  Abuse: Current or Past(Physical, Sexual or Emotional)- {YES/NO/NA:138455}  Do you feel safe in your environment? {YES/NO/NA:803218}    Social History     Tobacco Use     Smoking status: Never Smoker     Smokeless tobacco: Never Used   Substance Use Topics     Alcohol use: No     Alcohol/week: 0.0 oz     If you drink alcohol do you typically have >3 drinks per day or >7 drinks per week? {ETOH :892811}                     Reviewed orders with patient.  Reviewed health maintenance and updated orders accordingly - {Yes/No:765824::\"Yes\"}  {Chronicprobdata (Optional):145931}    {Mammo Decision Support (Optional):166859}    Pertinent mammograms are reviewed under the imaging tab.  History of abnormal Pap smear: {PAP HX:244669}     Reviewed and updated as needed this visit by clinical staff         Reviewed and updated as needed this visit by Provider        {HISTORY OPTIONS (Optional):170403}    ROS:  { :653503}    OBJECTIVE:   There were no vitals taken for this visit.  EXAM:  {Exam " "Choices:308952}    {Diagnostic Test Results (Optional):531868::\"Diagnostic Test Results:\",\"Labs reviewed in Epic\"}    ASSESSMENT/PLAN:   {Diag Picklist:346837}    COUNSELING:   {FEMALE COUNSELING MESSAGES:371925::\"Reviewed preventive health counseling, as reflected in patient instructions\"}    Estimated body mass index is 29.93 kg/m  as calculated from the following:    Height as of 8/7/18: 1.619 m (5' 3.75\").    Weight as of 8/7/18: 78.5 kg (173 lb).    {Weight Management Plan (ACO) Complete if BMI is abnormal-  Ages 18-64  BMI >24.9.  Age 65+ with BMI <23 or >30 (Optional):175033}     reports that she has never smoked. She has never used smokeless tobacco.  {Tobacco Cessation -- Complete if patient is a smoker (Optional):335678}    Counseling Resources:  ATP IV Guidelines  Pooled Cohorts Equation Calculator  Breast Cancer Risk Calculator  FRAX Risk Assessment  ICSI Preventive Guidelines  Dietary Guidelines for Americans, 2010  USDA's MyPlate  ASA Prophylaxis  Lung CA Screening    Manasa Campbell, BERNICE [facility]  "

## 2019-09-03 NOTE — PROGRESS NOTES
"Subjective     Phoebe Morris is a 18 year old female who presents to clinic today for the following health issues:    HPI   Medication Followup of Sprintec - traveling to [place] this week and out of refills     Taking Medication as prescribed: yes    Side Effects:  None    Medication Helping Symptoms:  yes       Patient Active Problem List   Diagnosis   (none) - all problems resolved or deleted     No past surgical history on file.    Social History     Tobacco Use     Smoking status: Never Smoker     Smokeless tobacco: Never Used   Substance Use Topics     Alcohol use: No     Alcohol/week: 0.0 oz     Family History   Problem Relation Age of Onset     Diabetes Father         Type 2     Diabetes Maternal Grandfather      Pre-Diabetes Sister          Current Outpatient Medications   Medication Sig Dispense Refill     norgestimate-ethinyl estradiol (SPRINTEC 28) 0.25-35 MG-MCG tablet Take 1 tablet by mouth daily 180 tablet 1     No Known Allergies    Reviewed and updated as needed this visit by Provider         Review of Systems   ROS COMP: Constitutional, HEENT, cardiovascular, pulmonary, gi and gu systems are negative, except as otherwise noted.      Objective    /74 (BP Location: Right arm, Patient Position: Sitting, Cuff Size: Adult Regular)   Pulse 102   Temp 98.7  F (37.1  C) (Oral)   Ht 1.619 m (5' 3.75\")   Wt 75.8 kg (167 lb)   SpO2 99%   BMI 28.89 kg/m    Body mass index is 28.89 kg/m .  Physical Exam   GENERAL: healthy, alert and no distress  RESP: lungs clear to auscultation - no rales, rhonchi or wheezes  CV: regular rate and rhythm, normal S1 S2, no S3 or S4, no murmur, click or rub, no peripheral edema and peripheral pulses strong  PSYCH: mentation appears normal, affect normal/bright      Assessment & Plan     Phoebe was seen today for recheck.    Diagnoses and all orders for this visit:    Encounter for surveillance of contraceptive pills  -     norgestimate-ethinyl estradiol (SPRINTEC 28) " 0.25-35 MG-MCG tablet; Take 1 tablet by mouth daily    Routine screening for STI (sexually transmitted infection)  -     NEISSERIA GONORRHOEA PCR  -     CHLAMYDIA TRACHOMATIS PCR      Return in about 4 months (around 1/3/2020) for Physical Exam/pap smear.    BERNICE Bradshaw [facility]

## 2019-09-04 RX ORDER — NORGESTIMATE AND ETHINYL ESTRADIOL 0.25-0.035
KIT ORAL
Qty: 84 TABLET | Refills: 0 | OUTPATIENT
Start: 2019-09-04

## 2019-09-04 NOTE — TELEPHONE ENCOUNTER
Routing to provider who saw pt today     Tiffanie Odell RN, BSN  [facility]

## 2019-09-05 LAB
C TRACH DNA SPEC QL NAA+PROBE: NEGATIVE
N GONORRHOEA DNA SPEC QL NAA+PROBE: NEGATIVE
SPECIMEN SOURCE: NORMAL
SPECIMEN SOURCE: NORMAL

## 2019-09-05 NOTE — RESULT ENCOUNTER NOTE
Dear Phoebe,     Gonorrhea and Chlamydia screening tests were negative and reassuring.      Please send a infirst Healthcare message or call [phone]  if you have any questions.      BERNICE Bradshaw, CNP  [facility]    If you have further questions about the interpretation of your labs, labtestsonline.org is a good website to check out for further information.

## 2020-01-08 ENCOUNTER — OFFICE VISIT (OUTPATIENT)
Dept: OBGYN | Facility: CLINIC | Age: 20
End: 2020-01-08
Payer: COMMERCIAL

## 2020-01-08 VITALS
WEIGHT: 177 LBS | BODY MASS INDEX: 30.22 KG/M2 | SYSTOLIC BLOOD PRESSURE: 128 MMHG | HEIGHT: 64 IN | DIASTOLIC BLOOD PRESSURE: 72 MMHG

## 2020-01-08 DIAGNOSIS — Z30.09 ENCOUNTER FOR OTHER GENERAL COUNSELING OR ADVICE ON CONTRACEPTION: ICD-10-CM

## 2020-01-08 DIAGNOSIS — Z01.419 WOMEN'S ANNUAL ROUTINE GYNECOLOGICAL EXAMINATION: Primary | ICD-10-CM

## 2020-01-08 PROCEDURE — 99385 PREV VISIT NEW AGE 18-39: CPT | Performed by: ADVANCED PRACTICE MIDWIFE

## 2020-01-08 RX ORDER — MISOPROSTOL 100 UG/1
TABLET ORAL
Qty: 2 TABLET | Refills: 0 | Status: SHIPPED | OUTPATIENT
Start: 2020-01-08 | End: 2020-09-11

## 2020-01-08 RX ORDER — IBUPROFEN 800 MG/1
800 TABLET, FILM COATED ORAL EVERY 8 HOURS PRN
Qty: 16 TABLET | Refills: 1 | Status: SHIPPED | OUTPATIENT
Start: 2020-01-08 | End: 2021-03-01

## 2020-01-08 ASSESSMENT — MIFFLIN-ST. JEOR: SCORE: 1548.9

## 2020-01-08 NOTE — PATIENT INSTRUCTIONS
Reviewed instructions for use of Mirena IUD. Discussed insertion procedure, risk/benefit profile, common side effects and more serious potential complications including infection and uterine perforation. Instructed to insert cytotec intravaginally the night before the insertion, and to take ibuprofen one hour prior to procedure. Advised patient to expect some bleeding and cramping immediately after the insertion and potentially irregular cramping and bleeding for up to three months following insertion. Given written and verbal information. All questions answered. Patient wishes to move forward and schedule IUD insertion.    Your Intrauterine Device (IUD)     What to watch for right after IUD placement:      Some women may experience uterine cramps, bleeding, and/or dizziness during and right after IUD placement       To help minimize the cramps, you may take ibuprofen 800 mg with food prior to your appointment. These symptoms should improve over the next 24 hours.  Mild cramping may be present for a few days after IUD placement. Please continue taking the ibuprofen 800 mg with food three times a day for the next five days.      You may experience spotting or bleeding for the first few weeks after IUD placement      Use condoms or abstain from sex for 7 days after the insertion of your Mirena or Brittany IUD      If you experience fever, abdominal pain, worsening pelvic pain, dizziness, unusually heavy vaginal bleeding, suspected expulsion of device or four smelling vaginal discharge please come to the clinic for evaluation      Please schedule an appointment at the clinic for a string check 4-6 weeks after IUD placement    Your periods may change (Brittany/Mirena):      For the first 3 to 6 months, your monthly period may become irregular. You may also have frequent spotting or light bleeding. A few women have heavy bleeding during this time. After your body adjusts, the number of bleeding days is likely to decrease (but  may remain irregular), and you may even find that your periods stop altogether for as long as Brittany/Mirena is in place.  Your periods will return rapidly once the IUD is removed.      ParaGard IUD users:      ParaGard IUD users may experience heavier than normal cycles while their IUD is in place, this is considered normal     Back-up contraception is not needed                Checking for your strings:      We encourage everyone with an IUD in place to check for their strings monthly      You may check your own IUD strings by inserting a finger into the vagina and feeling the strings as they exit the cervix.  The strings will initially feel firm, like fishing line, but will soften over a few weeks.  After the strings have softened, you or your partner should not be able to feel the strings during intercourse.       If the string length greatly changes or if you cannot feel your strings at all please make an appointment to see you midwife and use a backup method of contraception like a condom.      If you can feel something hard/plastic like the IUD may not be in the correct place. You should then see your healthcare provider to have the position confirmed with ultrasound.       Remember:    IUD's do not protect against HIV or STIs.  IUD's do not prevent the formation of ovarian cysts.  IUD's do not typically reduce acne or cause weight gain or mood changes.    For more information:  [email or web address]      If you have questions or concerns please call:    Emelina Jaquez  [phone]

## 2020-01-08 NOTE — NURSING NOTE
"Chief Complaint   Patient presents with     Physical     Discuss getting an IUD       Initial /72 (BP Location: Right arm, Cuff Size: Adult Regular)   Ht 1.619 m (5' 3.75\")   Wt 80.3 kg (177 lb)   LMP 2019 (Approximate)   Breastfeeding No   BMI 30.62 kg/m   Estimated body mass index is 30.62 kg/m  as calculated from the following:    Height as of this encounter: 1.619 m (5' 3.75\").    Weight as of this encounter: 80.3 kg (177 lb).  BP completed using cuff size: regular    Questioned patient about current smoking habits.  Pt. has never smoked.          The following HM Due: pap smear      Eduard Deluca CMA    "

## 2020-01-08 NOTE — PROGRESS NOTES
Phoebe is a 19 year old  female who presents for annual exam.     Besides routine health maintenance,  she would like to discuss having an IUD placed.  HPI:  Phoebe presents today for her annual exam. She is interested in having a Mirena IUD placed. She is also due for her first pap test.  Menses are regular q 28-30 days and normal lasting 4 days.   Menses flow: normal.    Patient's last menstrual period was 2019 (approximate)..   Using oral contraceptives for contraception.    She is not currently considering pregnancy.    REPRODUCTIVE/GYNECOLOGIC HISTORY:  Phoebe is not sexually active with male partner(s) and is not currently in monogamous relationship. Last sexual contact one year ago  STI testing offered?  Declined until IUD insertion  History of abnormal Pap smear:  No  SOCIAL HISTORY  Abuse: does not report having previously been physical or sexually abused.    Do you feel safe in your environment? YES       She  reports that she has never smoked. She has never used smokeless tobacco.      Last PHQ-9 score on record =   PHQ-9 SCORE 2017   PHQ-9 Total Score 1     Last GAD7 score on record =   GISEL-7 SCORE 2017   Total Score 1     Alcohol Score = 0    HEALTH MAINTENANCE:  Cholesterol: (No results found for: CHOL History of abnormal lipids: No  Mammo: NA . History of abnormal Mammo: No.  Regular Self Breast Exams: Yes  TSH: (  TSH   Date Value Ref Range Status   2017 1.48 0.40 - 4.00 mU/L Final    )  Pap; (No results found for: PAP )  Immunizations up to date: yes  (Gardasil, Tdap, Flu)  Health maintenance updated:  yes    HEALTHY HABITS  Eating habits: follows a balanced nutrition diet  Calcium/Vitamin D intake: source:  dairy Adequate? 2-3 servings per day  Exercise: How often do you exercise? 3-5 times/week;Walking, Cardio and Strength Training  Have you had an eye exam in the last two years? YES     Do you routinely see the dentist once or twice yearly? YES         HISTORY:  OB  History    Para Term  AB Living   0 0 0 0 0 0   SAB TAB Ectopic Multiple Live Births   0 0 0 0 0     Past Medical History:   Diagnosis Date     NO ACTIVE PROBLEMS 2020     Past Surgical History:   Procedure Laterality Date     NO HISTORY OF SURGERY  2020     Family History   Problem Relation Age of Onset     Diabetes Father         Type 2     Diabetes Maternal Grandfather      Pre-Diabetes Sister      Social History     Socioeconomic History     Marital status: Single     Spouse name: None     Number of children: None     Years of education: None     Highest education level: None   Occupational History     None   Social Needs     Financial resource strain: None     Food insecurity:     Worry: None     Inability: None     Transportation needs:     Medical: None     Non-medical: None   Tobacco Use     Smoking status: Never Smoker     Smokeless tobacco: Never Used   Substance and Sexual Activity     Alcohol use: No     Alcohol/week: 0.0 standard drinks     Drug use: No     Sexual activity: Not Currently     Partners: Male     Birth control/protection: Pill   Lifestyle     Physical activity:     Days per week: None     Minutes per session: None     Stress: None   Relationships     Social connections:     Talks on phone: None     Gets together: None     Attends Sikhism service: None     Active member of club or organization: None     Attends meetings of clubs or organizations: None     Relationship status: None     Intimate partner violence:     Fear of current or ex partner: None     Emotionally abused: None     Physically abused: None     Forced sexual activity: None   Other Topics Concern     Parent/sibling w/ CABG, MI or angioplasty before 65F 55M? Not Asked   Social History Narrative     None       Current Outpatient Medications:      norgestimate-ethinyl estradiol (SPRINTEC 28) 0.25-35 MG-MCG tablet, Take 1 tablet by mouth daily, Disp: 180 tablet, Rfl: 1   No Known Allergies    Past  "medical, surgical, social and family history were reviewed and updated in [facility].    ROS:   CONSTITUTIONAL: NEGATIVE for fever, chills, change in weight  INTEGUMENTARU/SKIN: NEGATIVE for worrisome rashes, moles or lesions  EYES: NEGATIVE for vision changes or irritation  ENT: NEGATIVE for ear, mouth and throat problems  RESP: NEGATIVE for significant cough or SOB  BREAST: NEGATIVE for masses, tenderness or discharge  CV: NEGATIVE for chest pain, palpitations or peripheral edema  GI: NEGATIVE for nausea, abdominal pain, heartburn, or change in bowel habits  : NEGATIVE for unusual urinary or vaginal symptoms. Periods are regular.  MUSCULOSKELETAL: NEGATIVE for significant arthralgias or myalgia  NEURO: NEGATIVE for weakness, dizziness or paresthesias  ENDOCRINE: NEGATIVE for temperature intolerance, skin/hair changes  HEME/ALLERGY/IMMUNE: NEGATIVE for bleeding problems  PSYCHIATRIC: NEGATIVE for changes in mood or affect    PHYSICAL EXAM:  /72 (BP Location: Right arm, Cuff Size: Adult Regular)   Ht 1.619 m (5' 3.75\")   Wt 80.3 kg (177 lb)   LMP 12/22/2019 (Approximate)   Breastfeeding No   BMI 30.62 kg/m     BMI: Body mass index is 30.62 kg/m .  Constitutional: healthy, alert and no distress  Neck: symmetrical, thyroid normal size, no masses present, no lymphadenopathy present.   Cardiovascular: RRR, no murmurs present  Respiratory: breathing unlabored, lungs CTA bilaterally  Breast:normal without masses, tenderness or nipple discharge and no palpable axillary masses or adenopathy  Gastrointestinal: abdomen soft, non-tender, bowel sounds present  PELVIC EXAM:  Deferred until upcoming appointment for IUD insertion in 2 weeks. Will also do pap at that time.    ASSESSMENT/PLAN:      ICD-10-CM    1. Women's annual routine gynecological examination Z01.419    2. Encounter for other general counseling or advice on contraception Z30.09 misoprostol (CYTOTEC) 100 MCG tablet     ibuprofen (ADVIL/MOTRIN) 800 MG tablet "     1. Exam normal and appropriate for age. Next annual exam due in one year. Patient will return for pelvic exam in 2 weeks.      COUNSELING:   Reviewed preventive health counseling, as reflected in patient instructions       Regular exercise       Healthy diet/nutrition       Contraception   reports that she has never smoked. She has never used smokeless tobacco.      2. Reviewed instructions for use of Mirena IUD. Discussed insertion procedure, risk/benefit profile, common side effects and more serious potential complications including infection and uterine perforation. Instructed to insert cytotec intravaginally the night before the insertion, and to take ibuprofen one hour prior to procedure. Advised patient to expect some bleeding and cramping immediately after the insertion and potentially irregular cramping and bleeding for up to three months following insertion. Given written and verbal information. All questions answered. Patient wishes to move forward and schedule IUD insertion.    BERNICE Castillo, CNM

## 2020-01-21 ENCOUNTER — OFFICE VISIT (OUTPATIENT)
Dept: OBGYN | Facility: CLINIC | Age: 20
End: 2020-01-21
Payer: COMMERCIAL

## 2020-01-21 VITALS — SYSTOLIC BLOOD PRESSURE: 132 MMHG | DIASTOLIC BLOOD PRESSURE: 70 MMHG

## 2020-01-21 DIAGNOSIS — Z30.430 ENCOUNTER FOR INSERTION OF INTRAUTERINE CONTRACEPTIVE DEVICE: Primary | ICD-10-CM

## 2020-01-21 DIAGNOSIS — Z12.4 PAP SMEAR FOR CERVICAL CANCER SCREENING: ICD-10-CM

## 2020-01-21 DIAGNOSIS — Z30.430 ENCOUNTER FOR INSERTION OF MIRENA IUD: ICD-10-CM

## 2020-01-21 PROCEDURE — 87591 N.GONORRHOEAE DNA AMP PROB: CPT | Performed by: ADVANCED PRACTICE MIDWIFE

## 2020-01-21 PROCEDURE — 58300 INSERT INTRAUTERINE DEVICE: CPT | Performed by: ADVANCED PRACTICE MIDWIFE

## 2020-01-21 PROCEDURE — G0145 SCR C/V CYTO,THINLAYER,RESCR: HCPCS | Performed by: ADVANCED PRACTICE MIDWIFE

## 2020-01-21 PROCEDURE — 87491 CHLMYD TRACH DNA AMP PROBE: CPT | Performed by: ADVANCED PRACTICE MIDWIFE

## 2020-01-21 NOTE — NURSING NOTE
"Chief Complaint   Patient presents with     IUD     Mirena placement     Gyn Exam     pap       Initial /70 (BP Location: Right arm, Cuff Size: Adult Regular)   LMP 2020  Estimated body mass index is 30.62 kg/m  as calculated from the following:    Height as of 20: 1.619 m (5' 3.75\").    Weight as of 20: 80.3 kg (177 lb).  BP completed using cuff size: regular    Questioned patient about current smoking habits.  Pt. has never smoked.          The following HM Due: pap smear    Carmen Francois CMA      "

## 2020-01-21 NOTE — LETTER
[facility]  [address], [address]  [place], [place]  [address]                                            TEL:[phone]  FAX:[phone]            Phoebe Morris  [address] [address]      January 21, 2020              Due to recent medical procedure, I am recommending that Phoebe Morris not partake in any strenuous physical activity for one week.    Please feel free to call me if you have any questions or concerns.                     Sincerely,            BERNICE Castillo, LEELA

## 2020-01-21 NOTE — PROGRESS NOTES
MIDWIFE IUD PLACEMENT NOTE    IUD type: Kyleena  Lot #: [id number]  NDC#: 94640-528-57    HPI:   Phoebe Morris is a 19 year old female here today for IUD insertion.  Patient's last menstrual period was 01/20/2020..  Today's pregnancy test  Not done as patient has not been sexually active for past year  Patient has premedicated with Ibuprofen 800 mgs  Patient did use Cytotec prior to IUD insertion  STD testing offered? accepted  Patient does not have any infections or cervicitis  Patient does not have history of liver problems or cancer.     Patient has been given written information.  I have reviewed the risks of the IUD including pregnancy, PID, life threatening infection, perforation, expulsion, cramping, changes in bleeding and ovarian cysts. Benefits of the IUD and alternative family planning methods have been discussed.  The probable mechanisms of action were covered, failure rates, spontaneous expulsion, the importance of checking the string monthly, as well as minor or nuisance side effects such as ovarian cysts, migraines, skin changes irregular and unpredictable bleeding in the first 6 months of use and bleeding patterns after the first 6 months.  The 's printed material was provided for her review.  Patients questions are answered.  Patient has verbalized understanding of risks and benefits and has signed the consent form.      Health maintenance updated:  yes    No Known Allergies  Current Outpatient Medications   Medication Sig Dispense Refill     ibuprofen (ADVIL/MOTRIN) 800 MG tablet Take 1 tablet (800 mg) by mouth every 8 hours as needed for moderate pain 16 tablet 1     misoprostol (CYTOTEC) 100 MCG tablet Insert both tablets into vagina the night before your IUD insertion procedure. 2 tablet 0     norgestimate-ethinyl estradiol (SPRINTEC 28) 0.25-35 MG-MCG tablet Take 1 tablet by mouth daily 180 tablet 1      Past Medical History:   Diagnosis Date     NO ACTIVE PROBLEMS 01/08/2020      Family History   Problem Relation Age of Onset     Diabetes Father         Type 2     Diabetes Maternal Grandfather      Pre-Diabetes Sister      Social History     Socioeconomic History     Marital status: Single     Spouse name: Not on file     Number of children: Not on file     Years of education: Not on file     Highest education level: Not on file   Occupational History     Not on file   Social Needs     Financial resource strain: Not on file     Food insecurity:     Worry: Not on file     Inability: Not on file     Transportation needs:     Medical: Not on file     Non-medical: Not on file   Tobacco Use     Smoking status: Never Smoker     Smokeless tobacco: Never Used   Substance and Sexual Activity     Alcohol use: No     Alcohol/week: 0.0 standard drinks     Drug use: No     Sexual activity: Not Currently     Partners: Male     Birth control/protection: Pill   Lifestyle     Physical activity:     Days per week: Not on file     Minutes per session: Not on file     Stress: Not on file   Relationships     Social connections:     Talks on phone: Not on file     Gets together: Not on file     Attends Church service: Not on file     Active member of club or organization: Not on file     Attends meetings of clubs or organizations: Not on file     Relationship status: Not on file     Intimate partner violence:     Fear of current or ex partner: Not on file     Emotionally abused: Not on file     Physically abused: Not on file     Forced sexual activity: Not on file   Other Topics Concern     Parent/sibling w/ CABG, MI or angioplasty before 65F 55M? Not Asked   Social History Narrative     Not on file     Past Surgical History:   Procedure Laterality Date     NO HISTORY OF SURGERY  01/08/2020       REVIEW OF SYSTEMS:  CONSTITUTIONAL: NEGATIVE for fever, chills, change in weight  BREAST: NEGATIVE for masses, tenderness or discharge  GI: NEGATIVE for nausea, abdominal pain, heartburn, or change in bowel  habits  : NEGATIVE for unusual urinary or vaginal symptoms. Periods are regular.  HEME/ALLERGY/IMMUNE: NEGATIVE for bleeding problems  PSYCHIATRIC: NEGATIVE for changes in mood or affect    EXAM:  /70 (BP Location: Right arm, Cuff Size: Adult Regular)   LMP 01/20/2020     Exam:  Constitutional: healthy, alert and no distress  Respiratory: negative, Percussion normal. Good diaphragmatic excursion.   Gastrointestinal: Abdomen soft, non-tender.  No masses, organomegaly  Psychiatric: mentation appears normal and affect normal/bright    PELVIC EXAM:  Vulva: No external lesions, BUS WNL  Vagina: Moist, pink, discharge normal  well rugated, no lesions, swab collected for GC/Chlam  Cervix:, smooth, pink, no visible lesions, neg CMT, pap obtained  Uterus: Normal size, anteverted, non-tender, mobile  Ovaries: No mass, non-tender  Rectal exam: deferred      ASSESSMENT/ PLAN:  No diagnosis found.  Procedure:  Uterus assessed for position and is anteverted.  Sterile speculum inserted.  Betadine prep of cervix done.  Tenaculum applied at 10 and 2 o'clock.  Uterine sounded to 7.0 cm's.  Cervical dilators not used.   IUD inserted in the usual fashion without difficulty.  Tenaculum removed with scant bleeding from the cervix.  Strings trimmed to 3.5 cm's.  Patient tolerated the procedure well        COUNSELING:  Verbal and written instructions given to patient regarding checking IUD strings.    Reviewed warning signs of fever, sharp/severe abdominal pain, reassured it can be normal to have menstrual like cramping after placement and spotting/light bleeding may last a few weeks after placement.    Reviewed with patient that the IUD needs to be replaced in 5 years, nothing in vagina for 2 days following placement of Mirena or Brittany IUD's.  Use b/u method for one week following IUD placement.    Follow up appointment in 4 weeks if unable to feel IUD strings    BERNICE Castillo, LEELA

## 2020-01-21 NOTE — PATIENT INSTRUCTIONS
Your Intrauterine Device (IUD)     What to watch for right after IUD placement:      Some women may experience uterine cramps, bleeding, and/or dizziness during and right after IUD placement       To help minimize the cramps, you may take ibuprofen 800 mg with food prior to your appointment. These symptoms should improve over the next 24 hours.  Mild cramping may be present for a few days after IUD placement. Please continue taking the ibuprofen 800 mg with food three times a day for the next five days.      You may experience spotting or bleeding for the first few weeks after IUD placement      Use condoms or abstain from sex for 7 days after the insertion of your Mirena or Brittany IUD      If you experience fever, abdominal pain, worsening pelvic pain, dizziness, unusually heavy vaginal bleeding, suspected expulsion of device or four smelling vaginal discharge please come to the clinic for evaluation      Please schedule an appointment at the clinic for a string check 4-6 weeks after IUD placement    Your periods may change (Brittany/Mirena):      For the first 3 to 6 months, your monthly period may become irregular. You may also have frequent spotting or light bleeding. A few women have heavy bleeding during this time. After your body adjusts, the number of bleeding days is likely to decrease (but may remain irregular), and you may even find that your periods stop altogether for as long as Brittany/Mirena is in place.  Your periods will return rapidly once the IUD is removed.      ParaGard IUD users:      ParaGard IUD users may experience heavier than normal cycles while their IUD is in place, this is considered normal     Back-up contraception is not needed                Checking for your strings:      We encourage everyone with an IUD in place to check for their strings monthly      You may check your own IUD strings by inserting a finger into the vagina and feeling the strings as they exit the cervix.  The strings  will initially feel firm, like fishing line, but will soften over a few weeks.  After the strings have softened, you or your partner should not be able to feel the strings during intercourse.       If the string length greatly changes or if you cannot feel your strings at all please make an appointment to see you midwife and use a backup method of contraception like a condom.      If you can feel something hard/plastic like the IUD may not be in the correct place. You should then see your healthcare provider to have the position confirmed with ultrasound.       Remember:    IUD's do not protect against HIV or STIs.  IUD's do not prevent the formation of ovarian cysts.  IUD's do not typically reduce acne or cause weight gain or mood changes.    For more information:  [email or web address]/      If you have questions or concerns please call:    Emelina Jaquez  [phone]

## 2020-01-27 LAB
COPATH REPORT: NORMAL
PAP: NORMAL

## 2020-03-02 ENCOUNTER — HEALTH MAINTENANCE LETTER (OUTPATIENT)
Age: 20
End: 2020-03-02

## 2020-09-11 ENCOUNTER — OFFICE VISIT (OUTPATIENT)
Dept: OBGYN | Facility: CLINIC | Age: 20
End: 2020-09-11
Payer: COMMERCIAL

## 2020-09-11 VITALS
SYSTOLIC BLOOD PRESSURE: 116 MMHG | HEIGHT: 64 IN | DIASTOLIC BLOOD PRESSURE: 72 MMHG | HEART RATE: 84 BPM | WEIGHT: 171 LBS | BODY MASS INDEX: 29.19 KG/M2

## 2020-09-11 DIAGNOSIS — N92.6 MISSED PERIOD: ICD-10-CM

## 2020-09-11 DIAGNOSIS — Z30.431 IUD CHECK UP: Primary | ICD-10-CM

## 2020-09-11 PROCEDURE — 99213 OFFICE O/P EST LOW 20 MIN: CPT | Performed by: ADVANCED PRACTICE MIDWIFE

## 2020-09-11 ASSESSMENT — MIFFLIN-ST. JEOR: SCORE: 1521.68

## 2020-09-11 NOTE — PROGRESS NOTES
"S: Phoebe Morris   is a 19 year old  here for IUD check.  She denies problems with the IUD. She states she had it placed in January, and her periods were regular until July. Since July she has had irregular periods and spotting. She reports she has taken multiple pregnancy tests.           O: /72   Pulse 84   Ht 1.619 m (5' 3.75\")   Wt 77.6 kg (171 lb)   LMP 08/28/2020   Breastfeeding No   BMI 29.58 kg/m    PELVIC EXAM:  Vulva: No external lesions, normal hair distribution, no adenopathy, BUS WNL  Vagina: Moist, pink, no abnormal discharge, well rugated, no lesions  Cervix: smooth, pink, no visible lesions,  IUD strings visualized and are extruding approximately 3 cm from cervical os        ASSESSMENT/PLAN:  1. (Z30.431) IUD check up  (primary encounter diagnosis)  - Discussed that irregular periods can be normal with the Mirena, and some women do not have periods. Will check UPT to r/o pregnancy.  - RTC when due for annual exam or suspected problems with her IUD such as abnormal bleeding, disappearance of the strings or feeling the IUD in her cervix or with any pain with intercourse, or abnormal discharge.  If she starts having increased cramping with menses,  ibuprofen 600-800mg po TID with food can be used.  If she decides that she wants to attempt pregnancy in the future, she will schedule an appointment to have the IUD removed.     2. (N92.6) Missed period  Plan: HCL HCG, URINE, NURSE BACKOFFICE  - Pt desires UPT today d/t her missed period    BERNICE Saenz, LULIM    "

## 2020-09-11 NOTE — NURSING NOTE
"Chief Complaint   Patient presents with     IUD     IUD check. Doing well. Normal periods for several months and now has become more irregular.        Initial /72   Pulse 84   Ht 1.619 m (5' 3.75\")   Wt 77.6 kg (171 lb)   LMP 2020   Breastfeeding No   BMI 29.58 kg/m   Estimated body mass index is 29.58 kg/m  as calculated from the following:    Height as of this encounter: 1.619 m (5' 3.75\").    Weight as of this encounter: 77.6 kg (171 lb).  BP completed using cuff size: regular    Questioned patient about current smoking habits.  Pt. has never smoked.          The following HM Due: NONE      The following patient reported/Care Every where data was sent to:  P ABSTRACT QUALITY INITIATIVES [[id number]]  Ila Beckman LPN             "

## 2020-12-13 ENCOUNTER — NURSE TRIAGE (OUTPATIENT)
Dept: NURSING | Facility: CLINIC | Age: 20
End: 2020-12-13

## 2020-12-13 ENCOUNTER — APPOINTMENT (OUTPATIENT)
Dept: ULTRASOUND IMAGING | Facility: CLINIC | Age: 20
End: 2020-12-13
Attending: EMERGENCY MEDICINE
Payer: COMMERCIAL

## 2020-12-13 ENCOUNTER — HOSPITAL ENCOUNTER (EMERGENCY)
Facility: CLINIC | Age: 20
Discharge: HOME | End: 2020-12-13
Attending: EMERGENCY MEDICINE | Admitting: EMERGENCY MEDICINE
Payer: COMMERCIAL

## 2020-12-13 VITALS
BODY MASS INDEX: 29.02 KG/M2 | SYSTOLIC BLOOD PRESSURE: 119 MMHG | DIASTOLIC BLOOD PRESSURE: 84 MMHG | WEIGHT: 170 LBS | OXYGEN SATURATION: 96 % | HEIGHT: 64 IN | TEMPERATURE: 99.2 F | HEART RATE: 79 BPM | RESPIRATION RATE: 18 BRPM

## 2020-12-13 DIAGNOSIS — R10.9 ACUTE ABDOMINAL PAIN: ICD-10-CM

## 2020-12-13 DIAGNOSIS — U07.1 2019 NOVEL CORONAVIRUS DISEASE (COVID-19): ICD-10-CM

## 2020-12-13 LAB
ALBUMIN SERPL-MCNC: 4.1 G/DL (ref 3.4–5)
ALBUMIN UR-MCNC: 30 MG/DL
ALP SERPL-CCNC: 72 U/L (ref 40–150)
ALT SERPL W P-5'-P-CCNC: 23 U/L (ref 0–50)
ANION GAP SERPL CALCULATED.3IONS-SCNC: 6 MMOL/L (ref 3–14)
APPEARANCE UR: CLEAR
AST SERPL W P-5'-P-CCNC: 10 U/L (ref 0–45)
BACTERIA #/AREA URNS HPF: ABNORMAL /HPF
BASOPHILS # BLD AUTO: 0 10E9/L (ref 0–0.2)
BASOPHILS NFR BLD AUTO: 0.2 %
BILIRUB SERPL-MCNC: 0.4 MG/DL (ref 0.2–1.3)
BILIRUB UR QL STRIP: NEGATIVE
BUN SERPL-MCNC: 17 MG/DL (ref 7–30)
CALCIUM SERPL-MCNC: 8.7 MG/DL (ref 8.5–10.1)
CHLORIDE SERPL-SCNC: 108 MMOL/L (ref 94–109)
CO2 SERPL-SCNC: 24 MMOL/L (ref 20–32)
COLOR UR AUTO: YELLOW
CREAT SERPL-MCNC: 0.75 MG/DL (ref 0.52–1.04)
DIFFERENTIAL METHOD BLD: NORMAL
EOSINOPHIL # BLD AUTO: 0 10E9/L (ref 0–0.7)
EOSINOPHIL NFR BLD AUTO: 0.1 %
ERYTHROCYTE [DISTWIDTH] IN BLOOD BY AUTOMATED COUNT: 11.6 % (ref 10–15)
GFR SERPL CREATININE-BSD FRML MDRD: >90 ML/MIN/{1.73_M2}
GLUCOSE SERPL-MCNC: 94 MG/DL (ref 70–99)
GLUCOSE UR STRIP-MCNC: NEGATIVE MG/DL
HCG SERPL QL: NEGATIVE
HCT VFR BLD AUTO: 45.7 % (ref 35–47)
HGB BLD-MCNC: 15.4 G/DL (ref 11.7–15.7)
HGB UR QL STRIP: ABNORMAL
IMM GRANULOCYTES # BLD: 0 10E9/L (ref 0–0.4)
IMM GRANULOCYTES NFR BLD: 0.1 %
KETONES UR STRIP-MCNC: 5 MG/DL
LEUKOCYTE ESTERASE UR QL STRIP: NEGATIVE
LIPASE SERPL-CCNC: 178 U/L (ref 73–393)
LYMPHOCYTES # BLD AUTO: 1.6 10E9/L (ref 0.8–5.3)
LYMPHOCYTES NFR BLD AUTO: 19 %
MCH RBC QN AUTO: 30.3 PG (ref 26.5–33)
MCHC RBC AUTO-ENTMCNC: 33.7 G/DL (ref 31.5–36.5)
MCV RBC AUTO: 90 FL (ref 78–100)
MONOCYTES # BLD AUTO: 0.6 10E9/L (ref 0–1.3)
MONOCYTES NFR BLD AUTO: 6.9 %
MUCOUS THREADS #/AREA URNS LPF: PRESENT /LPF
NEUTROPHILS # BLD AUTO: 6.3 10E9/L (ref 1.6–8.3)
NEUTROPHILS NFR BLD AUTO: 73.7 %
NITRATE UR QL: NEGATIVE
NRBC # BLD AUTO: 0 10*3/UL
NRBC BLD AUTO-RTO: 0 /100
PH UR STRIP: 5.5 PH (ref 5–7)
PLATELET # BLD AUTO: 353 10E9/L (ref 150–450)
POTASSIUM SERPL-SCNC: 3.8 MMOL/L (ref 3.4–5.3)
PROT SERPL-MCNC: 7.6 G/DL (ref 6.8–8.8)
RBC # BLD AUTO: 5.09 10E12/L (ref 3.8–5.2)
RBC #/AREA URNS AUTO: <1 /HPF (ref 0–2)
SODIUM SERPL-SCNC: 138 MMOL/L (ref 133–144)
SOURCE: ABNORMAL
SP GR UR STRIP: 1.01 (ref 1–1.03)
SQUAMOUS #/AREA URNS AUTO: 4 /HPF (ref 0–1)
UROBILINOGEN UR STRIP-MCNC: 2 MG/DL (ref 0–2)
WBC # BLD AUTO: 8.5 10E9/L (ref 4–11)
WBC #/AREA URNS AUTO: 2 /HPF (ref 0–5)

## 2020-12-13 PROCEDURE — 85025 COMPLETE CBC W/AUTO DIFF WBC: CPT | Performed by: EMERGENCY MEDICINE

## 2020-12-13 PROCEDURE — 83690 ASSAY OF LIPASE: CPT | Performed by: EMERGENCY MEDICINE

## 2020-12-13 PROCEDURE — 76856 US EXAM PELVIC COMPLETE: CPT

## 2020-12-13 PROCEDURE — 96360 HYDRATION IV INFUSION INIT: CPT

## 2020-12-13 PROCEDURE — 84703 CHORIONIC GONADOTROPIN ASSAY: CPT | Performed by: EMERGENCY MEDICINE

## 2020-12-13 PROCEDURE — 96361 HYDRATE IV INFUSION ADD-ON: CPT

## 2020-12-13 PROCEDURE — 80053 COMPREHEN METABOLIC PANEL: CPT | Performed by: EMERGENCY MEDICINE

## 2020-12-13 PROCEDURE — 81001 URINALYSIS AUTO W/SCOPE: CPT | Performed by: EMERGENCY MEDICINE

## 2020-12-13 PROCEDURE — 99285 EMERGENCY DEPT VISIT HI MDM: CPT | Mod: 25

## 2020-12-13 PROCEDURE — 258N000003 HC RX IP 258 OP 636: Performed by: EMERGENCY MEDICINE

## 2020-12-13 RX ORDER — ONDANSETRON 2 MG/ML
4 INJECTION INTRAMUSCULAR; INTRAVENOUS EVERY 30 MIN PRN
Status: DISCONTINUED | OUTPATIENT
Start: 2020-12-13 | End: 2020-12-14 | Stop reason: HOSPADM

## 2020-12-13 RX ORDER — ONDANSETRON 4 MG/1
4 TABLET, ORALLY DISINTEGRATING ORAL EVERY 6 HOURS PRN
Qty: 15 TABLET | Refills: 0 | Status: SHIPPED | OUTPATIENT
Start: 2020-12-13 | End: 2021-03-01

## 2020-12-13 RX ADMIN — SODIUM CHLORIDE 1000 ML: 9 INJECTION, SOLUTION INTRAVENOUS at 20:03

## 2020-12-13 ASSESSMENT — MIFFLIN-ST. JEOR: SCORE: 1521.11

## 2020-12-13 NOTE — TELEPHONE ENCOUNTER
Patient calling - says she tested positive for COVID19 yesterday.  Symptoms have included chills, cough, stuffy nose, sore throat and fatigue.  Around 2:30 pm today she developed a sharp pain under her belly button. Rates pain 10/10 and it lasted for 5-10 minutes.  Pain subsides but does not go away completley.  Intense pain has occurred 3-4 times in last few hours.   Rates pain 7/10 now.  Says she goes from laying on her bed to laying on bathroom floor.  Had two episodes of diarrhea about an hour ago.    No fever.  No blood with bowel movements.  No vomiting.    6:07 pm called [facility] paging ([facility]) to place page to on-call provider:   for second level triage.  Per Dr. Abad - patient should go to ED Now for evaluation.    6:18 pm called patient and relayed Dr. Abad's recommendation.  Patient intends to have someone drive her to ED now.    Mary Beth Caro RN  Triage Nurse Advisor      COVID 19 Nurse Triage Plan/Patient Instructions    Please be aware that novel coronavirus (COVID-19) may be circulating in the community. If you develop symptoms such as fever, cough, or SOB or if you have concerns about the presence of another infection including coronavirus (COVID-19), please contact your health care provider or visit [email or web address].     Disposition/Instructions    ED Visit recommended. Follow protocol based instructions.     Bring Your Own Device:  Please also bring your smart device(s) (smart phones, tablets, laptops) and their charging cables for your personal use and to communicate with your care team during your visit.    Thank you for taking steps to prevent the spread of this virus.  o Limit your contact with others.  o Wear a simple mask to cover your cough.  o Wash your hands well and often.    Resources     [facility] [employer] [facility]: About COVID-19: [email or web address]    CDC: What to Do If You're Sick: www.cdc.gov/coronavirus/2019-ncov/about/steps-when-sick.html    CDC:  "Ending Home Isolation: www.cdc.gov/coronavirus/2019-ncov/hcp/disposition-in-home-patients.html     CDC: Caring for Someone: www.cdc.gov/coronavirus/2019-ncov/if-you-are-sick/care-for-someone.html     [facility]: Interim Guidance for Hospital Discharge to Home: www.health.[facility].[place]/diseases/coronavirus/hcp/hospdischarge.pdf    [facility] clinical trials (COVID-19 research studies): clinicalaffairs.[facility]/umn-clinical-trials     Below are the COVID-19 hotlines at the [place] Department of [facility] ([facility]). Interpreters are available.   o For health questions: Call 220-313-6128 or 1-430.689.3547 (7 a.m. to 7 p.m.)  o For questions about schools and childcare: Call 843-923-7362 or 1-527.280.5103 (7 a.m. to 7 p.m.)     Additional Information    Negative: Shock suspected (e.g., cold/pale/clammy skin, too weak to stand, low BP, rapid pulse)    Negative: Difficult to awaken or acting confused (e.g., disoriented, slurred speech)    Negative: Passed out (i.e., lost consciousness, collapsed and was not responding)    Negative: Sounds like a life-threatening emergency to the triager    Negative: Chest pain    Negative: Pain is mainly in upper abdomen  (if needed ask: \"is it mainly above the belly button?\")    Negative: Followed an abdomen (stomach) injury    Negative: [1] Abdominal pain AND [2] pregnant < 20 weeks    Negative: [1] Abdominal pain AND [2] pregnant > 20 weeks    Negative: [1] Abdominal pain AND [2] postpartum (from 0 to 6 weeks after delivery)    Negative: [1] SEVERE pain (e.g., excruciating) AND [2] present > 1 hour    Negative: [1] SEVERE pain AND [2] age > 60    Negative: [1] Vomiting AND [2] contains red blood or black (\"coffee ground\") material  (Exception: few red streaks in vomit that only happened once)    Negative: Blood in bowel movements   (Exception: blood on surface of BM with constipation)    Negative: Black or tarry bowel movements  (Exception: chronic-unchanged  black-grey bowel movements " AND is taking iron pills or Pepto-bismol)    Negative: Patient sounds very sick or weak to the triager    [1] MILD-MODERATE pain AND [2] constant AND [3] present > 2 hours    Protocols used: ABDOMINAL PAIN - FEMALE-A-AH

## 2020-12-13 NOTE — ED AVS SNAPSHOT
[facility] Emergency Dept  [address] [address]  Phone: [phone]  Fax: [phone]                                    Phoebe Morris   MRN: [id number]    Department: [facility] Emergency Dept   Date of Visit: 12/13/2020           After Visit Summary Signature Page    I have received my discharge instructions, and my questions have been answered. I have discussed any challenges I see with this plan with the nurse or doctor.    ..........................................................................................................................................  Patient/Patient Representative Signature      ..........................................................................................................................................  Patient Representative Print Name and Relationship to Patient    ..................................................               ................................................  Date                                   Time    ..........................................................................................................................................  Reviewed by Signature/Title    ...................................................              ..............................................  Date                                               Time          22EPIC Rev 08/18

## 2020-12-14 NOTE — ED PROVIDER NOTES
"  History     Chief Complaint:  Abdominal Pain     HPI history supplemented by electronic chart review    Phoebe Morris is a 19 year old female who presents with abdominal pain. The patient reports testing positive for COVID-19 yesterday.  She has been feeling ill for 4 days, initially with whole body and muscle aches as well as chills.  She denies any trouble breathing.  Her primary concern tonight is abdominal pain starting below her bellybutton that started around 2 PM while she was at home.  She denies any associated vomiting, diarrhea, urinary symptoms, vaginal discharge, or vaginal bleeding.  Her last menstrual period was several months ago, and her periods are typically irregular due to her IUD.  Her pain was intense earlier but is now much more mild, and she declines any medication here in the ED now.  She took some Excedrin this morning for her pre-existing symptoms attributed to Covid.    Allergies:  No known drug allergies    Medications:    Mirena    Past Medical History:    Positive COVID-19 12/2020    Past Surgical History:    History reviewed. No pertinent surgical history.    Family History:    Type 2 diabetes    Social History:  Smoking status: Never  Alcohol use: No  Drug use: No  The patient presents to the emergency department by herself.  Works at [employer]    Review of Systems   All other systems reviewed and are negative.    Physical Exam     Patient Vitals for the past 24 hrs:   BP Temp Temp src Pulse Resp SpO2 Height Weight   12/13/20 2252 -- 99.2  F (37.3  C) Temporal 79 18 96 % -- --   12/13/20 2008 119/84 99.1  F (37.3  C) Oral 76 16 99 % -- --   12/13/20 2007 -- -- -- -- -- -- 1.626 m (5' 4\") 77.1 kg (170 lb)     Physical Exam  General: Woman sitting upright in room 7  HENT: mucous membranes moist, OP clear  CV: rate as above, no LE edema  Resp: normal effort, speaks in full phrases, no stridor, no cough observed  GI: Abdomen soft, minimal lower abdominal tenderness " without guarding, no lateralizing tenderness, no distention  MSK: no bony tenderness, no CVAT  Skin: appropriately warm and dry, umbilical piercing, no abdominal rash  Neuro: alert, clear speech, oriented   Psych: cooperative    Emergency Department Course   Imaging:  Radiographic findings were communicated with the patient who voiced understanding of the findings.    US Pelvis Complete w Transvag & Doppler LmtPel Duplex Limited   IMPRESSION:     1.  Negative pelvic ultrasound.     As read by Radiology.    Laboratory:  CBC: WNL (WBC 8.5, HGB 15.4, )  CMP: AWNL(Creatinine 0.75)  Lipase: 178  HCG: Neg    UA: Ketone 5, blood moderate, protein 30, bacteria few, squamous 4 (H), mucous present, o/w neg.       Interventions:  2003 NS 1L IV Bolus  2013 Zofran 4 mg IV    Emergency Department Course:  Past medical records, nursing notes, and vitals reviewed.  1946: I performed an exam of the patient and obtained history, as documented above.    IV was inserted and blood was drawn for laboratory testing, results above.    The patient provided a urine sample here in the emergency department. This was sent for laboratory testing, findings above.    The patient was sent for a pelvis w/ transvaginal US while in the emergency department, findings above.     2230: Rechecked the patient via phone. Findings and plan explained to the Patient. Patient discharged home with instructions regarding supportive care, medications, and reasons to return. The importance of close follow-up was reviewed.     Impression & Plan    Medical Decision Making:  Differential diagnosis for her acute central lower abdominal discomfort is broad.  No lateralizing symptoms to raise higher concern for ovarian cyst or torsion, and ultrasound of her pelvic organs is benign.  No worrisome tenderness on exam to suggest that CT imaging is indicated at this time, nor surgical consultation.  She declined any analgesics.  Her symptoms could be related to  recently diagnosed COVID-19, though she has no hypoxia, respiratory distress, or other worrisome vitals to suggest hospitalization for Covid itself.  Laboratory studies are benign, and her pregnancy test is negative, ruling out ectopic pregnancy.  She is tolerating oral intake.  Low suspicion for inflammatory bowel disease, perforated viscus, ureteral stone, or other more immediately threatening condition.  She readily agrees with the plan for discharge home and close outpatient recheck with supportive care in the interim.  Return precautions reviewed and agreed upon.    Diagnosis:    ICD-10-CM    1. Acute abdominal pain  R10.9    2. 2019 novel coronavirus disease (COVID-19)  U07.1        Disposition:  Discharged home.    Discharge Medications:  Discharge Medication List as of 12/13/2020 10:35 PM      START taking these medications    Details   ondansetron (ZOFRAN ODT) 4 MG ODT tab Take 1 tablet (4 mg) by mouth every 6 hours as needed for nausea, Disp-15 tablet, R-0, Local Print           This note was completed in part using Dragon voice recognition software. Although reviewed after completion, some word and grammatical errors may occur.    Scribe Disclosure:  I, Miriam Park, am serving as a scribe at 7:47 PM on 12/13/2020 to document services personally performed by Dante Monahan MD based on my observations and the provider's statements to me.  [facility] EMERGENCY DEPT       Dante Monahan MD  12/13/20 [id number]

## 2020-12-20 ENCOUNTER — HEALTH MAINTENANCE LETTER (OUTPATIENT)
Age: 20
End: 2020-12-20

## 2021-02-24 ENCOUNTER — TELEPHONE (OUTPATIENT)
Dept: FAMILY MEDICINE | Facility: CLINIC | Age: 21
End: 2021-02-24

## 2021-02-24 NOTE — TELEPHONE ENCOUNTER
Pt calling to inquire if needing Toe nail removed. Pt reports toe nails are yellow in color and thick. Pt stated this has been ongoing for a year and would like to clear before this summer.         Pt advised can be seen by Provider to evaluate and treat. Pt noted she has moved and would like to go to clinic near her. Advised [facility] is close to Pt.     Patient stated an understanding and agreed with plan.  Next 5 appointments (look out 90 days)    Mar 01, 2021  1:00 PM  SHORT with BERNICE Reeves CNP  [facility] ([facility] [address] [facility] [place] [address]  [phone]         Corbin KEE RN   [facility]

## 2021-02-28 ENCOUNTER — HEALTH MAINTENANCE LETTER (OUTPATIENT)
Age: 21
End: 2021-02-28

## 2021-03-01 ENCOUNTER — OFFICE VISIT (OUTPATIENT)
Dept: FAMILY MEDICINE | Facility: CLINIC | Age: 21
End: 2021-03-01
Payer: COMMERCIAL

## 2021-03-01 VITALS
SYSTOLIC BLOOD PRESSURE: 108 MMHG | DIASTOLIC BLOOD PRESSURE: 72 MMHG | WEIGHT: 176 LBS | BODY MASS INDEX: 30.05 KG/M2 | HEIGHT: 64 IN | HEART RATE: 79 BPM | OXYGEN SATURATION: 100 % | TEMPERATURE: 98.7 F

## 2021-03-01 DIAGNOSIS — L85.3 DRY SKIN: ICD-10-CM

## 2021-03-01 DIAGNOSIS — B35.1 ONYCHOMYCOSIS: Primary | ICD-10-CM

## 2021-03-01 PROCEDURE — 99203 OFFICE O/P NEW LOW 30 MIN: CPT | Performed by: NURSE PRACTITIONER

## 2021-03-01 RX ORDER — CICLOPIROX 80 MG/ML
SOLUTION TOPICAL
Qty: 100 ML | Refills: 3 | Status: SHIPPED | OUTPATIENT
Start: 2021-03-01 | End: 2022-10-26

## 2021-03-01 ASSESSMENT — MIFFLIN-ST. JEOR: SCORE: 1543.33

## 2021-03-01 NOTE — PROGRESS NOTES
"  Assessment & Plan     Onychomycosis  Reviewed topical and oral options. Because she does like to have alcohol socially she has decided to use the topical application.  Advised that it may take as long as 48 months to achieve resolution    - ciclopirox (PENLAC) 8 % external solution; Apply to adjacent skin and affected nails daily.  Remove with alcohol every 7 days, then repeat.    Dry skin  Will begin using amlactin lotion      BERNICE Reeves CNP  [facility] JHONNY Sandhu is a 20 year old who presents for the following health issues     HPI     Chief Complaint   Patient presents with     Toenail     Fungal infection of the right foot 1st, 2nd and 3rd toenails. Has had for some time.      Derm Problem     Right foot, bottom of foot skin peels. Works as a . Has had it before but healed on its own.   No itching. Mostly on the heels , dry and flaky   No chronic illness, no diabetes or immunocompromise  Uses Mirena IUD for contraception  Does drink alcohol socially    Has been using gel nail polish for a couple of years which has caused some damage and thinning of the toenails    Review of Systems   Constitutional, HEENT, cardiovascular, pulmonary, gi and gu systems are negative, except as otherwise noted.      Objective    /72 (BP Location: Left arm, Cuff Size: Adult Large)   Pulse 79   Temp 98.7  F (37.1  C) (Temporal)   Ht 1.626 m (5' 4\")   Wt 79.8 kg (176 lb)   SpO2 100%   Breastfeeding No   BMI 30.21 kg/m    Body mass index is 30.21 kg/m .  Physical Exam   GENERAL: healthy, alert and no distress  EYES: Eyes grossly normal to inspection, PERRL and conjunctivae and sclerae normal  MS: no gross musculoskeletal defects noted, no edema  SKIN: Right great toe, second toe and 3rd toenails are dystrophic and yellowing and thickened, no other nail involvement   There is superficial flaking and dryness of skin of right plantar heel, no cracking or bleeding or " peeling   NEURO: Normal strength and tone, mentation intact and speech normal  PSYCH: mentation appears normal, affect normal/bright

## 2021-04-23 ENCOUNTER — IMMUNIZATION (OUTPATIENT)
Dept: PEDIATRICS | Facility: CLINIC | Age: 21
End: 2021-04-23
Payer: COMMERCIAL

## 2021-04-23 PROCEDURE — 0001A PR COVID VAC PFIZER DIL RECON 30 MCG/0.3 ML IM: CPT

## 2021-04-23 PROCEDURE — 91300 PR COVID VAC PFIZER DIL RECON 30 MCG/0.3 ML IM: CPT

## 2021-05-14 ENCOUNTER — IMMUNIZATION (OUTPATIENT)
Dept: PEDIATRICS | Facility: CLINIC | Age: 21
End: 2021-05-14
Attending: INTERNAL MEDICINE
Payer: COMMERCIAL

## 2021-05-14 PROCEDURE — 0002A PR COVID VAC PFIZER DIL RECON 30 MCG/0.3 ML IM: CPT

## 2021-05-14 PROCEDURE — 91300 PR COVID VAC PFIZER DIL RECON 30 MCG/0.3 ML IM: CPT

## 2021-06-22 ENCOUNTER — HOSPITAL ENCOUNTER (OUTPATIENT)
Dept: BEHAVIORAL HEALTH | Facility: CLINIC | Age: 21
End: 2021-06-22
Attending: FAMILY MEDICINE
Payer: COMMERCIAL

## 2021-06-22 PROCEDURE — 999N000216 HC STATISTIC ADULT CD FACE TO FACE-NO CHRG: Mod: GT | Performed by: COUNSELOR

## 2021-06-22 ASSESSMENT — ANXIETY QUESTIONNAIRES
2. NOT BEING ABLE TO STOP OR CONTROL WORRYING: NOT AT ALL
1. FEELING NERVOUS, ANXIOUS, OR ON EDGE: SEVERAL DAYS
GAD7 TOTAL SCORE: 3
3. WORRYING TOO MUCH ABOUT DIFFERENT THINGS: SEVERAL DAYS
GAD7 TOTAL SCORE: 3
7. FEELING AFRAID AS IF SOMETHING AWFUL MIGHT HAPPEN: NOT AT ALL
GAD7 TOTAL SCORE: 3
7. FEELING AFRAID AS IF SOMETHING AWFUL MIGHT HAPPEN: NOT AT ALL
4. TROUBLE RELAXING: NOT AT ALL
5. BEING SO RESTLESS THAT IT IS HARD TO SIT STILL: NOT AT ALL
6. BECOMING EASILY ANNOYED OR IRRITABLE: SEVERAL DAYS

## 2021-06-22 ASSESSMENT — PATIENT HEALTH QUESTIONNAIRE - PHQ9
10. IF YOU CHECKED OFF ANY PROBLEMS, HOW DIFFICULT HAVE THESE PROBLEMS MADE IT FOR YOU TO DO YOUR WORK, TAKE CARE OF THINGS AT HOME, OR GET ALONG WITH OTHER PEOPLE: NOT DIFFICULT AT ALL
SUM OF ALL RESPONSES TO PHQ QUESTIONS 1-9: 2
SUM OF ALL RESPONSES TO PHQ QUESTIONS 1-9: 2
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
## 2021-06-23 ASSESSMENT — PATIENT HEALTH QUESTIONNAIRE - PHQ9: SUM OF ALL RESPONSES TO PHQ QUESTIONS 1-9: 2

## 2021-06-23 ASSESSMENT — ANXIETY QUESTIONNAIRES: GAD7 TOTAL SCORE: 3

## 2021-10-03 ENCOUNTER — HEALTH MAINTENANCE LETTER (OUTPATIENT)
Age: 21
End: 2021-10-03

## 2021-12-28 ENCOUNTER — IMMUNIZATION (OUTPATIENT)
Dept: NURSING | Facility: CLINIC | Age: 21
End: 2021-12-28
Payer: OTHER GOVERNMENT

## 2021-12-28 PROCEDURE — 0004A PR COVID VAC PFIZER DIL RECON 30 MCG/0.3 ML IM: CPT

## 2021-12-28 PROCEDURE — 91300 PR COVID VAC PFIZER DIL RECON 30 MCG/0.3 ML IM: CPT

## 2022-03-19 ENCOUNTER — HEALTH MAINTENANCE LETTER (OUTPATIENT)
Age: 22
End: 2022-03-19

## 2022-09-04 ENCOUNTER — HEALTH MAINTENANCE LETTER (OUTPATIENT)
Age: 22
End: 2022-09-04

## 2022-10-26 ENCOUNTER — OFFICE VISIT (OUTPATIENT)
Dept: MIDWIFE SERVICES | Facility: CLINIC | Age: 22
End: 2022-10-26
Payer: COMMERCIAL

## 2022-10-26 VITALS
HEIGHT: 64 IN | WEIGHT: 193 LBS | BODY MASS INDEX: 32.95 KG/M2 | SYSTOLIC BLOOD PRESSURE: 114 MMHG | DIASTOLIC BLOOD PRESSURE: 62 MMHG | OXYGEN SATURATION: 99 % | HEART RATE: 78 BPM

## 2022-10-26 DIAGNOSIS — Z30.011 ENCOUNTER FOR INITIAL PRESCRIPTION OF CONTRACEPTIVE PILLS: Primary | ICD-10-CM

## 2022-10-26 PROCEDURE — 99213 OFFICE O/P EST LOW 20 MIN: CPT | Mod: 25 | Performed by: MIDWIFE

## 2022-10-26 PROCEDURE — 58301 REMOVE INTRAUTERINE DEVICE: CPT | Performed by: MIDWIFE

## 2022-10-26 RX ORDER — NORGESTIMATE AND ETHINYL ESTRADIOL 0.25-0.035
1 KIT ORAL DAILY
Qty: 84 TABLET | Refills: 4 | Status: SHIPPED | OUTPATIENT
Start: 2022-10-26 | End: 2023-05-16

## 2022-10-26 NOTE — PROGRESS NOTES
Subjective:    Phoebe Morris is a 21 year old female who presents for birth control consultation. She is new to the [facility]. Current contraception method: Mirena IUD (placed 1/2020)n.Sexually transmitted infection risk: low. LMP none (IUD). Menstrual flow: spotting the last 2-3 months. Has used Sprintec OCP's for contraception in the past.     No LMP recorded.   Desires IUD removal today, see procedure note below.    Patient occupation: , Full time, Stressful  Medications:  Please see past medical history section.   Please see family history section.   Surgical history includes wisdom teeth extraction 2017.  Last Pap smear 121 2020, Normal    General health/lifestyle:   Patient states she always wears a seatbelt.  There are firearms in the home   There are working fire detectors in the home.  No smoking or tobacco use.   In a typical week, exercise includes: 4 to 5 days a week, Lifting and cardio  Diet: Well balanced  In a typical week she drinks 122alcoholic drinks.  No recreational drug use.  No physical, sexual or emotional abuse. Safe at home  She is in a safe, monogamous relationship. Partnered for 2.5 years.  Sexual history/family planning: Has been sexually active since she was 14, originally used OCP's, Eros hadAn IUD placed in January 2020, Now wants IUD removal into go back to OCP's-- Sprintec  For birth control she uses: Mirena IUD Being removed today and will switch to OCP's Sprintec  Patient is sexually active. In the last 12 months she has had 1 partner.  Her partners are male.  Types of sexual activity practiced: Vaginal and oral  Last menstrual period: None since IUD placement, Just Spotting.  PMS includes Mood swings, cramping, acne.  Patient Does not want STI testing today.  Review of systems: Anxiety, not officially diagnosed, open to resources,Counseling/Therapy.       Objective:   Modified pelvic as listed below for IUD removal only    Assessment:   21 year  old,   Birth control consult  IUD removal and OCP start    Plan:     -After discussion of methods, would like to use Sprintec OCP.   -Written and verbal information given on this method. ACES (OCPs), PAINS (IUDs) warning signs reviewed.   -Rx written for Sprintec.   -is familiar with and has used OCP's in the past successfully.   -IUD removal today  -RTC asap for annual/routine health maintanence      Procedure note:  Speculum placed and cx visualized. Strings of IUD visualized and grasped with a ringed forceps.   IUD removed with ease, complete/intact by inspection.    Phoebe Morris is 21 year old y o, para 000 presenting today for IUD removal.  Mirena IUD placed 1/2020, wishes removal due to spotting and wants OCP's instead.  Current problems with method: spotting    Bleeding history on method: spotting for 2-3 months    LMP No LMP recorded.  Contraceptive history includes:OCP's and IUD      Obj:  External genitalia: no skin lesions, female hair distribution   Bartholins/Urethral/[place] glands: no discharge or lesions   Vagina  pink, rugated, no lesions,  no discharge,                 no cystocele/rectocele   Cervix nulliparous, no lesions, no discharge   Rectovaginal examination not done.        Danette Coughlin CNM, LEELA QUEEN  40 min TT with pt Greater than 50% of time spent with patient was counseling, education and coordination of care.    Total time spent on the date of this encounter doing chart review, review of test results, patient visit, the physical exam & procedure, education, counseling, developing this plan of care, and documenting = 40 minutes.      Danette QUEEN CNM

## 2023-04-29 ENCOUNTER — HEALTH MAINTENANCE LETTER (OUTPATIENT)
Age: 23
End: 2023-04-29

## 2023-05-09 ASSESSMENT — ENCOUNTER SYMPTOMS
PARESTHESIAS: 0
WEAKNESS: 0
DIARRHEA: 0
SORE THROAT: 0
HEADACHES: 0
MYALGIAS: 0
CONSTIPATION: 0
PALPITATIONS: 0
COUGH: 0
EYE PAIN: 0
DIZZINESS: 0
HEMATURIA: 0
CHILLS: 0
NERVOUS/ANXIOUS: 1
FREQUENCY: 0
SHORTNESS OF BREATH: 0
BREAST MASS: 0
JOINT SWELLING: 0
ABDOMINAL PAIN: 0
ARTHRALGIAS: 0
DYSURIA: 0
FEVER: 0
HEARTBURN: 0
NAUSEA: 0
HEMATOCHEZIA: 0

## 2023-05-16 ENCOUNTER — OFFICE VISIT (OUTPATIENT)
Dept: FAMILY MEDICINE | Facility: CLINIC | Age: 23
End: 2023-05-16
Payer: COMMERCIAL

## 2023-05-16 VITALS
HEIGHT: 65 IN | SYSTOLIC BLOOD PRESSURE: 130 MMHG | WEIGHT: 214 LBS | BODY MASS INDEX: 35.65 KG/M2 | OXYGEN SATURATION: 99 % | HEART RATE: 92 BPM | DIASTOLIC BLOOD PRESSURE: 84 MMHG

## 2023-05-16 DIAGNOSIS — R53.83 OTHER FATIGUE: ICD-10-CM

## 2023-05-16 DIAGNOSIS — Z00.00 ROUTINE GENERAL MEDICAL EXAMINATION AT A HEALTH CARE FACILITY: Primary | ICD-10-CM

## 2023-05-16 DIAGNOSIS — Z83.3 FAMILY HISTORY OF DIABETES MELLITUS: ICD-10-CM

## 2023-05-16 DIAGNOSIS — Z12.4 CERVICAL CANCER SCREENING: ICD-10-CM

## 2023-05-16 DIAGNOSIS — Z11.3 SCREENING FOR STDS (SEXUALLY TRANSMITTED DISEASES): ICD-10-CM

## 2023-05-16 DIAGNOSIS — R63.5 WEIGHT GAIN: ICD-10-CM

## 2023-05-16 LAB
ERYTHROCYTE [DISTWIDTH] IN BLOOD BY AUTOMATED COUNT: 12.1 % (ref 10–15)
HBA1C MFR BLD: 5 % (ref 0–5.6)
HCT VFR BLD AUTO: 40.8 % (ref 35–47)
HGB BLD-MCNC: 14.1 G/DL (ref 11.7–15.7)
MCH RBC QN AUTO: 30.6 PG (ref 26.5–33)
MCHC RBC AUTO-ENTMCNC: 34.6 G/DL (ref 31.5–36.5)
MCV RBC AUTO: 89 FL (ref 78–100)
PLATELET # BLD AUTO: 371 10E3/UL (ref 150–450)
RBC # BLD AUTO: 4.61 10E6/UL (ref 3.8–5.2)
WBC # BLD AUTO: 8.4 10E3/UL (ref 4–11)

## 2023-05-16 PROCEDURE — 36415 COLL VENOUS BLD VENIPUNCTURE: CPT | Performed by: PHYSICIAN ASSISTANT

## 2023-05-16 PROCEDURE — 87491 CHLMYD TRACH DNA AMP PROBE: CPT | Performed by: PHYSICIAN ASSISTANT

## 2023-05-16 PROCEDURE — 83036 HEMOGLOBIN GLYCOSYLATED A1C: CPT | Performed by: PHYSICIAN ASSISTANT

## 2023-05-16 PROCEDURE — 90715 TDAP VACCINE 7 YRS/> IM: CPT | Performed by: PHYSICIAN ASSISTANT

## 2023-05-16 PROCEDURE — 84443 ASSAY THYROID STIM HORMONE: CPT | Performed by: PHYSICIAN ASSISTANT

## 2023-05-16 PROCEDURE — 90471 IMMUNIZATION ADMIN: CPT | Performed by: PHYSICIAN ASSISTANT

## 2023-05-16 PROCEDURE — 84439 ASSAY OF FREE THYROXINE: CPT | Performed by: PHYSICIAN ASSISTANT

## 2023-05-16 PROCEDURE — G0145 SCR C/V CYTO,THINLAYER,RESCR: HCPCS | Performed by: PHYSICIAN ASSISTANT

## 2023-05-16 PROCEDURE — 80053 COMPREHEN METABOLIC PANEL: CPT | Performed by: PHYSICIAN ASSISTANT

## 2023-05-16 PROCEDURE — 82306 VITAMIN D 25 HYDROXY: CPT | Performed by: PHYSICIAN ASSISTANT

## 2023-05-16 PROCEDURE — 87591 N.GONORRHOEAE DNA AMP PROB: CPT | Performed by: PHYSICIAN ASSISTANT

## 2023-05-16 PROCEDURE — 85027 COMPLETE CBC AUTOMATED: CPT | Performed by: PHYSICIAN ASSISTANT

## 2023-05-16 PROCEDURE — 99395 PREV VISIT EST AGE 18-39: CPT | Mod: 25 | Performed by: PHYSICIAN ASSISTANT

## 2023-05-16 ASSESSMENT — ENCOUNTER SYMPTOMS
ABDOMINAL PAIN: 0
MYALGIAS: 0
HEMATOCHEZIA: 0
PALPITATIONS: 0
SORE THROAT: 0
DYSURIA: 0
BREAST MASS: 0
DIZZINESS: 0
HEADACHES: 0
NAUSEA: 0
JOINT SWELLING: 0
CHILLS: 0
DIARRHEA: 0
CONSTIPATION: 0
FEVER: 0
EYE PAIN: 0
HEMATURIA: 0
ARTHRALGIAS: 0
COUGH: 0
WEAKNESS: 0
NERVOUS/ANXIOUS: 1
PARESTHESIAS: 0
FREQUENCY: 0
SHORTNESS OF BREATH: 0
HEARTBURN: 0

## 2023-05-16 NOTE — PROGRESS NOTES
SUBJECTIVE:   CC: Phoebe is an 22 year old who presents for preventive health visit.       5/16/2023     1:19 PM   Additional Questions   Roomed by carlton     Patient has been advised of split billing requirements and indicates understanding: Yes  Healthy Habits:     Getting at least 3 servings of Calcium per day:  Yes    Bi-annual eye exam:  Yes    Dental care twice a year:  Yes    Sleep apnea or symptoms of sleep apnea:  None    Diet:  Regular (no restrictions)    Frequency of exercise:  4-5 days/week    Duration of exercise:  30-45 minutes    Taking medications regularly:  Not Applicable    Medication side effects:  Not applicable    PHQ-2 Total Score: 0    Additional concerns today:  Yes    No new sexual partners.     Not on birth control - one since coming off pill end of April.     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         Today's PHQ-2 Score:       5/9/2023     9:21 AM   PHQ-2 ( 1999 Pf[employer])   Q1: Little interest or pleasure in doing things 0   Q2: Feeling down, depressed or hopeless 0   PHQ-2 Score 0   Q1: Little interest or pleasure in doing things Not at all    Not at all   Q2: Feeling down, depressed or hopeless Not at all    Not at all   PHQ-2 Score 0    0       Have you ever done Advance Care Planning? (For example, a Health Directive, POLST, or a discussion with a medical provider or your loved ones about your wishes): No, advance care planning information given to patient to review.  Patient declined advance care planning discussion at this time.    Social History     Tobacco Use     Smoking status: Never     Smokeless tobacco: Never   Vaping Use     Vaping status: Never Used   Substance Use Topics     Alcohol use: Yes     Comment: 1-2x every couple weeks - on social occasions             5/9/2023     9:21 AM   Alcohol Use   Prescreen: >3 drinks/day or >7 drinks/week? No     Reviewed orders with patient.  Reviewed health maintenance and updated orders accordingly - Yes  BP Readings  "from Last 3 Encounters:   05/16/23 130/84   10/26/22 114/62   03/01/21 108/72    Wt Readings from Last 3 Encounters:   05/16/23 97.1 kg (214 lb)   10/26/22 87.5 kg (193 lb)   03/01/21 79.8 kg (176 lb)                    Breast Cancer Screening:        History of abnormal Pap smear: NO - age 21-29 PAP every 3 years recommended      1/21/2020     4:26 PM   PAP / HPV   PAP (Historical) NIL       Reviewed and updated as needed this visit by clinical staff   Tobacco  Allergies  Meds  Problems  Med Hx  Surg Hx  Fam Hx          Reviewed and updated as needed this visit by Provider   Tobacco  Allergies  Meds  Problems  Med Hx  Surg Hx  Fam Hx             Review of Systems   Constitutional: Negative for chills and fever.   HENT: Negative for congestion, ear pain, hearing loss and sore throat.    Eyes: Negative for pain and visual disturbance.   Respiratory: Negative for cough and shortness of breath.    Cardiovascular: Negative for chest pain, palpitations and peripheral edema.   Gastrointestinal: Negative for abdominal pain, constipation, diarrhea, heartburn, hematochezia and nausea.   Breasts:  Negative for tenderness, breast mass and discharge.   Genitourinary: Negative for dysuria, frequency, genital sores, hematuria, pelvic pain, urgency, vaginal bleeding and vaginal discharge.   Musculoskeletal: Negative for arthralgias, joint swelling and myalgias.   Skin: Negative for rash.   Neurological: Negative for dizziness, weakness, headaches and paresthesias.   Psychiatric/Behavioral: Negative for mood changes. The patient is nervous/anxious.           OBJECTIVE:   /84 (BP Location: Right arm, Patient Position: Sitting, Cuff Size: Adult Regular)   Pulse 92   Ht 1.645 m (5' 4.75\")   Wt 97.1 kg (214 lb)   LMP 04/25/2023 (Exact Date)   SpO2 99%   BMI 35.89 kg/m    Physical Exam  GENERAL: healthy, alert and no distress  EYES: Eyes grossly normal to inspection, PERRL and conjunctivae and sclerae " "normal  HENT: ear canals and TM's normal, nose and mouth without ulcers or lesions  NECK: no adenopathy, no asymmetry, masses, or scars and thyroid normal to palpation  RESP: lungs clear to auscultation - no rales, rhonchi or wheezes  CV: regular rate and rhythm, normal S1 S2, no S3 or S4, no murmur, click or rub, no peripheral edema and peripheral pulses strong  ABDOMEN: soft, nontender, no hepatosplenomegaly, no masses and bowel sounds normal   (female): normal female external genitalia, normal urethral meatus, vaginal mucosa pink, moist, well rugated, and normal cervix/adnexa/uterus without masses or discharge  MS: no gross musculoskeletal defects noted, no edema  SKIN: no suspicious lesions or rashes  NEURO: Normal strength and tone, mentation intact and speech normal  PSYCH: mentation appears normal, affect normal/bright    Diagnostic Test Results:  Labs reviewed in Epic    ASSESSMENT/PLAN:   1. Routine general medical examination at a health care facility  Well adult.     2. Weight gain  20 lb weight gain since October. Rule out other factors.   - Hemoglobin A1c; Future  - TSH; Future  - T4, free; Future    3. Screening for STDs (sexually transmitted diseases)  Screen.  - CHLAMYDIA TRACHOMATIS PCR  - NEISSERIA GONORRHOEA PCR    4. Cervical cancer screening  Screen.  - Pap Screen only - recommended age 21 - 24 years    5. Other fatigue  Eval.   - Vitamin D Deficiency; Future  - CBC with platelets; Future  - Comprehensive metabolic panel (BMP + Alb, Alk Phos, ALT, AST, Total. Bili, TP); Future    6. Family history of diabetes mellitus  Screen.  - Hemoglobin A1c; Future     Patient has been advised of split billing requirements and indicates understanding: Yes      COUNSELING:  Reviewed preventive health counseling, as reflected in patient instructions      BMI:   Estimated body mass index is 35.89 kg/m  as calculated from the following:    Height as of this encounter: 1.645 m (5' 4.75\").    Weight as of this " encounter: 97.1 kg (214 lb).   Weight management plan: Discussed healthy diet and exercise guidelines      She reports that she has never smoked. She has never used smokeless tobacco.          STEVE Tran [facility]

## 2023-05-17 LAB
ALBUMIN SERPL BCG-MCNC: 4.6 G/DL (ref 3.5–5.2)
ALP SERPL-CCNC: 78 U/L (ref 35–104)
ALT SERPL W P-5'-P-CCNC: 20 U/L (ref 10–35)
ANION GAP SERPL CALCULATED.3IONS-SCNC: 14 MMOL/L (ref 7–15)
AST SERPL W P-5'-P-CCNC: 22 U/L (ref 10–35)
BILIRUB SERPL-MCNC: 0.6 MG/DL
BUN SERPL-MCNC: 14.7 MG/DL (ref 6–20)
C TRACH DNA SPEC QL NAA+PROBE: NEGATIVE
CALCIUM SERPL-MCNC: 9.1 MG/DL (ref 8.6–10)
CHLORIDE SERPL-SCNC: 103 MMOL/L (ref 98–107)
CREAT SERPL-MCNC: 0.68 MG/DL (ref 0.51–0.95)
DEPRECATED CALCIDIOL+CALCIFEROL SERPL-MC: 24 UG/L (ref 20–75)
DEPRECATED HCO3 PLAS-SCNC: 21 MMOL/L (ref 22–29)
GFR SERPL CREATININE-BSD FRML MDRD: >90 ML/MIN/1.73M2
GLUCOSE SERPL-MCNC: 87 MG/DL (ref 70–99)
N GONORRHOEA DNA SPEC QL NAA+PROBE: NEGATIVE
POTASSIUM SERPL-SCNC: 4.1 MMOL/L (ref 3.4–5.3)
PROT SERPL-MCNC: 7.5 G/DL (ref 6.4–8.3)
SODIUM SERPL-SCNC: 138 MMOL/L (ref 136–145)
T4 FREE SERPL-MCNC: 1.28 NG/DL (ref 0.9–1.7)
TSH SERPL DL<=0.005 MIU/L-ACNC: 2.26 UIU/ML (ref 0.3–4.2)

## 2023-05-18 ENCOUNTER — MYC MEDICAL ADVICE (OUTPATIENT)
Dept: FAMILY MEDICINE | Facility: CLINIC | Age: 23
End: 2023-05-18
Payer: COMMERCIAL

## 2023-05-18 DIAGNOSIS — R63.5 WEIGHT GAIN: Primary | ICD-10-CM

## 2023-05-18 LAB
BKR LAB AP GYN ADEQUACY: NORMAL
BKR LAB AP GYN INTERPRETATION: NORMAL
BKR LAB AP HPV REFLEX: NO
BKR LAB AP PREVIOUS ABNORMAL: NORMAL
PATH REPORT.COMMENTS IMP SPEC: NORMAL
PATH REPORT.COMMENTS IMP SPEC: NORMAL
PATH REPORT.RELEVANT HX SPEC: NORMAL

## 2023-09-06 ASSESSMENT — SLEEP AND FATIGUE QUESTIONNAIRES
HOW LIKELY ARE YOU TO NOD OFF OR FALL ASLEEP WHILE SITTING INACTIVE IN A PUBLIC PLACE: WOULD NEVER DOZE
HOW LIKELY ARE YOU TO NOD OFF OR FALL ASLEEP WHILE SITTING QUIETLY AFTER LUNCH WITHOUT ALCOHOL: WOULD NEVER DOZE
HOW LIKELY ARE YOU TO NOD OFF OR FALL ASLEEP WHILE SITTING AND TALKING TO SOMEONE: WOULD NEVER DOZE
HOW LIKELY ARE YOU TO NOD OFF OR FALL ASLEEP IN A CAR, WHILE STOPPED FOR A FEW MINUTES IN TRAFFIC: WOULD NEVER DOZE
HOW LIKELY ARE YOU TO NOD OFF OR FALL ASLEEP WHILE WATCHING TV: WOULD NEVER DOZE
HOW LIKELY ARE YOU TO NOD OFF OR FALL ASLEEP WHILE SITTING AND READING: WOULD NEVER DOZE
HOW LIKELY ARE YOU TO NOD OFF OR FALL ASLEEP WHEN YOU ARE A PASSENGER IN A CAR FOR AN HOUR WITHOUT A BREAK: SLIGHT CHANCE OF DOZING
HOW LIKELY ARE YOU TO NOD OFF OR FALL ASLEEP WHILE LYING DOWN TO REST IN THE AFTERNOON WHEN CIRCUMSTANCES PERMIT: WOULD NEVER DOZE

## 2023-09-06 NOTE — PROGRESS NOTES
"    New Medical Weight Management Consult    PATIENT:  Phoebe Morris  MRN:         [id number]  :         2000  SUKH:         2023    Dear Edita Fuller PA-C,    I had the pleasure of seeing your patient, Phoebe Morris. Full intake/assessment was done to determine barriers to weight loss success and develop a treatment plan. Phoebe Morris is a 22 year old female interested in treatment of medical problems associated with excess weight. She has a height of Data Unavailable, a weight of 0 lbs 0 oz, and the calculated There is no height or weight on file to calculate BMI.    ASSESSMENT/PLAN:  1. Class 2 severe obesity due to excess calories with serious comorbidity and body mass index (BMI) of 35.0 to 35.9 in adult (H)  We discussed healthy habits to assist with weight loss. She will continue to work on planning meals ahead of time using the plate method for portion control and macronutrient proportions. I think her  has her on a calorie and protein goal that is a little high for weight loss but she will meet with our dietician and get her recommendations on that. She will continue tracking on [employer] pal.  We discussed medication that may assist with weight loss. Wegovy was prescribed. Risks/ benefits and possible side effects were discussed and questions were answered. Written information was given.  If she is unable to get wegovy we could use phentermine.    2. Chronic back pain, unspecified back location, unspecified back pain laterality  This may improve with healthy habits and weight loss.      She has the following co-morbidities:        2023     1:16 PM   --   I have the following health issues associated with obesity None of the above   I have the following symptoms associated with obesity Back Pain               2023     1:16 PM   Referring Provider   Please name the provider who referred you to Medical Weight Management  If you do not know, please answer \"I Don't Know\" " I dont know           9/6/2023     1:16 PM   Weight History   How concerned are you about your weight? Somewhat Concerned   I became overweight As an Adult   The following factors have contributed to my weight gain Change in Schedule    Lack of Exercise    Stress   I have tried the following methods to lose weight Watching Portions or Calories    Exercise   My lowest weight since age 16 was 156   My highest weight since age 16 was 215   The most weight I have ever lost was (lbs) 215   I have the following family history of obesity/being overweight Many of my relatives are overweight   How has your weight changed over the last year? Gained   How many pounds? About 25lbs     Patient is currently working with a . She does strength training 4 days per week, usually cardio is walking on walking pad or walking outside. Rare cardio that gets her heart rate going.     Her  has her eating 2000 tsering per day, 200 gms carbs, 150 gms of protein and 67 gms of fat: She has been doing this since mid June. She states she currently is in a maintenance phase.         9/6/2023     1:16 PM   Diet Recall Review with Patient   If you do eat breakfast, what types of food do you eat? Oatmeal, greek yogurt, fruit- overnight oats with fairlife milk and greek yogurt and protein powder and collagen and honey and fruit (800 tsering)   If you do eat lunch, what types of food do you typically eat? A protein, carb and healthy fat -- chicken or ground turkey, rice or potatoes and a veggie   If you do eat supper, what types of food do you typically eat? Same as lunch -- or breakfast for dinner like eggs, potatoes, al. Burgers, salads, chicken. Lately eggs, al and hashbrowns   If you do snack, what types of food do you typically eat? Granola/protein bars, fiber one bars, fruit, protein shake   How many glasses of juice do you drink in a typical day? 0   How many of glasses of milk do you drink in a typical day? 0   How many 8oz glasses  of sugar containing drinks such as Joseph-Aid/sweet tea do you drink in a day? 0   How many cans/bottles of sugar pop/soda/tea/sports drinks do you drink in a day? 0   How many cans/bottles of diet pop/soda/tea or sports drink do you drink in a day? 1d   How often do you have a drink of alcohol? 2-4 Times a Month   If you do drink, how many drinks might you have in a day? 1 or 2           9/6/2023     1:16 PM   Eating Habits   Generally, my meals include foods like these bread, pasta, rice, potatoes, corn, crackers, sweet dessert, pop, or juice Almost Everyday   Generally, my meals include foods like these fried meats, brats, burgers, french fries, pizza, cheese, chips, or ice cream A Few Times a Week   Eat fast food (like McDonalds, Burger Juan Carlos, Taco Bell) Less Than Weekly   Eat at a buffet or sit-down restaurant Less Than Weekly   Eat most of my meals in front of the TV or computer Less Than Weekly   Often skip meals, eat at random times, have no regular eating times Never   Rarely sit down for a meal but snack or graze throughout Never   Eat extra snacks between meals Less Than Weekly   Eat most of my food at the end of the day Never   Eat in the middle of the night or wake up at night to eat Never   Eat extra snacks to prevent or correct low blood sugar Less Than Weekly   Eat to prevent acid reflux or stomach pain Never   Worry about not having enough food to eat Never   I eat when I am depressed Never   I eat when I am stressed Once a Week   I eat when I am bored Less Than Weekly   I eat when I am anxious Never   I eat when I am happy or as a reward Never   I feel hungry all the time even if I just have eaten Never   Feeling full is important to me A Few Times a Week   I finish all the food on my plate even if I am already full Never   I can't resist eating delicious food or walk past the good food/smell Never   I eat/snack without noticing that I am eating Never   I eat when I am preparing the meal Never   I eat  more than usual when I see others eating Never   I have trouble not eating sweets, ice cream, cookies, or chips if they are around the house Less Than Weekly   I think about food all day Never   What foods, if any, do you crave? Sweets/Candy/Chocolate   Please list any other foods you crave? Chocolate     Meals not prepared at home per week: 1-2d        9/6/2023     1:16 PM   Amount of Food   I feel out of control when eating Never   I eat a large amount of food, like a loaf of bread, a box of cookies, a pint/quart of ice cream, all at once Never   I eat a large amount of food even when I am not hungry Never   I eat rapidly Never   I eat alone because I feel embarrassed and do not want others to see how much I have eaten Never   I eat until I am uncomfortably full Monthly   I feel bad, disgusted, or guilty after I overeat Monthly           9/6/2023     1:16 PM   Activity/Exercise History   How much of a typical 12 hour day do you spend sitting? Most of the Day   How much of a typical 12 hour day do you spend lying down? Less Than Half the Day   How much of a typical day do you spend walking/standing? Less Than Half the Day   How many hours (not including work) do you spend on the TV/Video Games/Computer/Tablet/Phone? 2-3 Hours   How many times a week are you active for the purpose of exercise? 4-5 TImes a Week   What keeps you from being more active? Lack of Time   How many total minutes do you spend doing some activity for the purpose of exercising when you exercise? More Than 30 Minutes       PAST MEDICAL HISTORY:  Past Medical History:   Diagnosis Date    NO ACTIVE PROBLEMS 01/08/2020 9/6/2023     1:16 PM   Work/Social History Reviewed With Patient   My employment status is Full-Time   My job is    How much of your job is spent on the computer or phone? 100%   How many hours do you spend commuting to work daily? About 30 minues   What is your marital status? Single   Who do you live  with? Significant other   Who does the food shopping? Both of us           9/6/2023     1:16 PM   Mental Health History Reviewed With Patient   Have you ever been physically or sexually abused? No   How often in the past 2 weeks have you felt little interest or pleasure in doing things? Not at all   Over the past 2 weeks how often have you felt down, depressed, or hopeless? Not at all           9/6/2023     1:16 PM   Sleep History Reviewed With Patient   How many hours do you sleep at night? 8       MEDICATIONS:   No current outpatient medications on file.       ALLERGIES:   No Known Allergies    ROS  General  Fatigue: no  HEENT  Hx of glaucoma: no  Vision changes: no  Cardiovascular  Hx of heart disease: no  Chest Pain with Exertion: no  Palpitations: no  Pulmonary  Shortness of breath at rest: no  Shortness of breath with exertion: no  Gastrointestinal  Heartburn: no  Pancreatitis: no  Psychiatric  Moods Stable: frustrated, stressed, some anxiety  Endocrine  Polydipsia: no  No personal or family history of medullary thyroid cancer: thyroid disease only  Neurologic  Hx of seizures: no  Migraine headaches: no    Birth control: natural planning  Kidney stones: no     PHYSICAL EXAM:  Wt Readings from Last 4 Encounters:   09/13/23 97.1 kg (214 lb)   05/16/23 97.1 kg (214 lb)   10/26/22 87.5 kg (193 lb)   03/01/21 79.8 kg (176 lb)      GEN: Alert and oriented in no acute distress.   HEENT: mucous membranes moist  CV: RRR no MRG  ABDOMEN: mild protuberance     FOLLOW-UP:   in 1 month with the dietician and in 3 months with myself     Total time spent on the date of this encounter doing: chart review, review of test results, patient visit, physical exam, education, counseling, developing plan of care and documenting = 62 minutes.     Sincerely,    SANTOS Edge MD

## 2023-09-13 ENCOUNTER — OFFICE VISIT (OUTPATIENT)
Dept: SURGERY | Facility: CLINIC | Age: 23
End: 2023-09-13
Attending: PHYSICIAN ASSISTANT
Payer: COMMERCIAL

## 2023-09-13 VITALS
DIASTOLIC BLOOD PRESSURE: 82 MMHG | WEIGHT: 214 LBS | BODY MASS INDEX: 36.54 KG/M2 | HEIGHT: 64 IN | SYSTOLIC BLOOD PRESSURE: 122 MMHG

## 2023-09-13 DIAGNOSIS — M54.9 CHRONIC BACK PAIN, UNSPECIFIED BACK LOCATION, UNSPECIFIED BACK PAIN LATERALITY: ICD-10-CM

## 2023-09-13 DIAGNOSIS — E66.812 CLASS 2 SEVERE OBESITY DUE TO EXCESS CALORIES WITH SERIOUS COMORBIDITY AND BODY MASS INDEX (BMI) OF 35.0 TO 35.9 IN ADULT (H): Primary | ICD-10-CM

## 2023-09-13 DIAGNOSIS — G89.29 CHRONIC BACK PAIN, UNSPECIFIED BACK LOCATION, UNSPECIFIED BACK PAIN LATERALITY: ICD-10-CM

## 2023-09-13 DIAGNOSIS — E66.01 CLASS 2 SEVERE OBESITY DUE TO EXCESS CALORIES WITH SERIOUS COMORBIDITY AND BODY MASS INDEX (BMI) OF 35.0 TO 35.9 IN ADULT (H): Primary | ICD-10-CM

## 2023-09-13 DIAGNOSIS — R63.5 WEIGHT GAIN: ICD-10-CM

## 2023-09-13 PROCEDURE — 99205 OFFICE O/P NEW HI 60 MIN: CPT | Performed by: FAMILY MEDICINE

## 2023-09-13 RX ORDER — SEMAGLUTIDE 0.5 MG/.5ML
0.5 INJECTION, SOLUTION SUBCUTANEOUS WEEKLY
Qty: 2 ML | Refills: 0 | Status: SHIPPED | OUTPATIENT
Start: 2023-09-13 | End: 2023-10-11

## 2023-09-13 RX ORDER — TERBINAFINE HYDROCHLORIDE 250 MG/1
TABLET ORAL
COMMUNITY
Start: 2023-08-08 | End: 2024-05-01

## 2023-09-13 RX ORDER — SEMAGLUTIDE 0.25 MG/.5ML
0.25 INJECTION, SOLUTION SUBCUTANEOUS WEEKLY
Qty: 1 ML | Refills: 0 | Status: SHIPPED | OUTPATIENT
Start: 2023-09-13 | End: 2023-10-11

## 2023-09-13 RX ORDER — SEMAGLUTIDE 1 MG/.5ML
1 INJECTION, SOLUTION SUBCUTANEOUS WEEKLY
Qty: 2 ML | Refills: 0 | Status: SHIPPED | OUTPATIENT
Start: 2023-09-13 | End: 2024-01-10

## 2023-09-13 NOTE — PATIENT INSTRUCTIONS
Eat Better ? Move More ? Live Well    Eat 3 nutrient-rich meals each day    Don t skip meals--it will cause you to overeat later in the day!    Eating fiber (vegetables/fruits/whole grains) and protein with meals helps you stay full longer    Choose foods with less than 10 grams of sugar and 5 grams of fat per serving to prevent excess calories and weight re-gain  Eat around the same times each day to develop a routine eating schedule   Avoid snacking unless physically hungry.   Planned snacks: 1-2 times per day and no more than 150 calories    Eat protein first   Protein helps with healing, maintaining adequate muscle mass, reducing hunger and optimizing nutritional status   Aim for 60-80 grams of protein per day   Fill up on Fiber   Fiber comes from plants--fruits, veggies, whole grains, nuts/seeds and beans   Fiber is low in calories, high in phytonutrients and helps you stay full longer   Aim for 25-35 grams per day by eating fiber with meals and snacks  Eat [id number]   Take 20-30 minutes to eat each meal by taking small bites, chewing foods to applesauce consistency or 20-30 times before you swallow   Eating foods too fast can delay satiety/fullness signals and increase overeating   Slow down your eating by using toddler utensils, putting your fork/spoon down between bites and not watching TV or emailing during meals!   Keep a Journal         Writing down what you eat, how you feel and when you are active helps you identify new changes to work on from week to week         Look for ways to cut 100 calories from your current diet 2-3 times per day  Drink 64 ounces of 0-Calorie drinks between meals   Water   Zero calorie Propel  or Vitamin Water     [employer]  Zero Calories   Crystal Light , Sugar-Free Joseph-Aid , and other sugar-free lemonade or flavored yoder   Keep Caffeine to less than 300mg per day ie: 3-6oz cups coffee     Work up to 45-60 minutes of physical activity most days of the week   Helps  with losing weight and prevent regaining those extra pounds!    Do a combo of cardio (walking/water exercises) and strength training (lifting weights/Vinyasa yoga)    Avoid Mindless Eating   Be present when you eat--take note of the smell, taste and quality of your food   Make a list of alternative activities you could do to prevent eating out of boredom/stress  Go for a walk, call a friend, chew gum, paint your nails, re-organize the garage, etc      Your provider has prescribed wegovy weekly injections to help assist you with your weight loss efforts. Please follow the instructions on your prescription as the dose will be gradually tapered up. Do not use if you have a personal or family history of medullary thyroid carcinoma or a personal history of Multiple Endocrine Neoplasia syndrome type 2. Pancreatitis has occurred in some clinical trials. If you develop abdominal pain and fever please stop this medication and call your provider.     1.  Start Wegovy (semaglutide) 0.25 mg once weekly for 4 weeks, then if tolerating increase to 0.5 mg weekly for 4 weeks, then if tolerating increase to 1 mg weekly for 4 weeks, then if tolerating increase to 1.7 mg weekly for 4 weeks, then if tolerating increase to 2.4 mg weekly thereafter.   -Each Wegovy pen is a different color to help identify the different dose strengths   -Each Wegovy pen is a once weekly single-dose prefilled pen with a pen injector already built within the pen. Discard the Wegovy pen after use in sharps container.     If you are struggling with side effects you can taper the dose up more slowly.    2. Storage: make sure that when you get the prescription that you store the prescription in the refrigerator until it is time to use the Wegovy pen.  Once it is time to use the Wegovy pen, you can keep the pen at room temperature and it is good for up to 28 days at room temperature. D    3.  Potential common side effects: nausea, headache, diarrhea, stomach  upset.  If these become unmanageable or concerning symptoms, please make sure to call or mychart.        Using Your Wegovy Pen  Medicine (semaglutide)    Storing your pens  Store your pens in the refrigerator until you are ready to use them. Don't let them freeze.  Your pen may be stored at room temperature for 28 days or fewer. Just make sure the temperature doesn't get higher than 86 or lower than 46 degrees Fahrenheit.   Protect the pens from light.  Never use any pens that have .    Check your pen before use:  The liquid in the pen window should be clear and colorless. Bubbles are okay to see.   Do not use the pen if you can see the window contains any specks or it is cloudy or has changed color.  Make sure you have the medication and dose your health care provider prescribed.    Getting ready to inject:   1. Wash and dry your hands well.  2. Make sure the counter you use to place your supplies on is clean.  3. Make sure your injection time will not be interrupted by children or pets.  4. Have alcohol wipes or alcohol and cotton balls available to clean the injection site.   5. Choose your injection site. It can be on your stomach, back of upper arms, or upper legs. Remember to change your injection site each time you inject. Try to be at least 1 inch away from the previous one. Stay at least 1 inch away from your belly button.     Inject your dose:  1. Pull off the grey cap off the pen. Throw it in the trash. Do not put the cap back on the pen.   2. Clean the injection site with alcohol.   3. Push the grey part of the pen firmly against your skin. This will start the injection.  4. When the pen is injecting, you will hear 2 clicks. The first click tells you the injection has started. The second one tells you the injection is continuing.   5. The injection is done when the yellow bar in the glass window at the bottom of the pen has stopped moving.   6. This injection is given 1 day a week. The pens come  in  5 doses ranging from 0.25 mg to 2.4 mg. Each dose comes in a different color pen.  7. If you miss a dose, take is as soon as you remember. Do not take a missed dose, if it is within 2 days of the next dose.    8. Your injection may be best tolerated if given at night.     Disposing of your pens:  Dispose of your pens in a puncture-resistant container (hard plastic bottle) or Sharps container.  Check with the county you live in on how to dispose of the container.  Do not recycle the container with used pens.     Of note, you may not be able to  your medication right away. It may require a prior authorization from your insurance company. This may take a week or more.

## 2023-09-13 NOTE — LETTER
2023         RE: Phoebe Morris  [address]  [facility] [address]        Dear Colleague,    Thank you for referring your patient, Phoebe Morris, to the [facility] SURG[facility] AND BARIATRICS CARE [place]. Please see a copy of my visit note below.        New Medical Weight Management Consult    PATIENT:  Phoebe Morris  MRN:         [id number]  :         2000  SUKH:         2023    Dear Edita Fuller PA-C,    I had the pleasure of seeing your patient, Phoebe Morris. Full intake/assessment was done to determine barriers to weight loss success and develop a treatment plan. Phoebe Morris is a 22 year old female interested in treatment of medical problems associated with excess weight. She has a height of Data Unavailable, a weight of 0 lbs 0 oz, and the calculated There is no height or weight on file to calculate BMI.    ASSESSMENT/PLAN:  1. Class 2 severe obesity due to excess calories with serious comorbidity and body mass index (BMI) of 35.0 to 35.9 in adult (H)  We discussed healthy habits to assist with weight loss. She will continue to work on planning meals ahead of time using the plate method for portion control and macronutrient proportions. I think her  has her on a calorie and protein goal that is a little high for weight loss but she will meet with our dietician and get her recommendations on that. She will continue tracking on [employer] pal.  We discussed medication that may assist with weight loss. Wegovy was prescribed. Risks/ benefits and possible side effects were discussed and questions were answered. Written information was given.  If she is unable to get wegovy we could use phentermine.    2. Chronic back pain, unspecified back location, unspecified back pain laterality  This may improve with healthy habits and weight loss.      She has the following co-morbidities:        2023     1:16 PM   --   I have the following health issues  "associated with obesity None of the above   I have the following symptoms associated with obesity Back Pain               9/6/2023     1:16 PM   Referring Provider   Please name the provider who referred you to Medical Weight Management  If you do not know, please answer \"I Don't Know\" I dont know           9/6/2023     1:16 PM   Weight History   How concerned are you about your weight? Somewhat Concerned   I became overweight As an Adult   The following factors have contributed to my weight gain Change in Schedule    Lack of Exercise    Stress   I have tried the following methods to lose weight Watching Portions or Calories    Exercise   My lowest weight since age 16 was 156   My highest weight since age 16 was 215   The most weight I have ever lost was (lbs) 215   I have the following family history of obesity/being overweight Many of my relatives are overweight   How has your weight changed over the last year? Gained   How many pounds? About 25lbs     Patient is currently working with a . She does strength training 4 days per week, usually cardio is walking on walking pad or walking outside. Rare cardio that gets her heart rate going.     Her  has her eating 2000 tsering per day, 200 gms carbs, 150 gms of protein and 67 gms of fat: She has been doing this since mid June. She states she currently is in a maintenance phase.         9/6/2023     1:16 PM   Diet Recall Review with Patient   If you do eat breakfast, what types of food do you eat? Oatmeal, greek yogurt, fruit- overnight oats with fairlife milk and greek yogurt and protein powder and collagen and honey and fruit (800 tsering)   If you do eat lunch, what types of food do you typically eat? A protein, carb and healthy fat -- chicken or ground turkey, rice or potatoes and a veggie   If you do eat supper, what types of food do you typically eat? Same as lunch -- or breakfast for dinner like eggs, potatoes, al. Burgers, salads, chicken. Lately eggs, " al and hashjocy   If you do snack, what types of food do you typically eat? Granola/protein bars, fiber one bars, fruit, protein shake   How many glasses of juice do you drink in a typical day? 0   How many of glasses of milk do you drink in a typical day? 0   How many 8oz glasses of sugar containing drinks such as Joseph-Aid/sweet tea do you drink in a day? 0   How many cans/bottles of sugar pop/soda/tea/sports drinks do you drink in a day? 0   How many cans/bottles of diet pop/soda/tea or sports drink do you drink in a day? 1d   How often do you have a drink of alcohol? 2-4 Times a Month   If you do drink, how many drinks might you have in a day? 1 or 2           9/6/2023     1:16 PM   Eating Habits   Generally, my meals include foods like these bread, pasta, rice, potatoes, corn, crackers, sweet dessert, pop, or juice Almost Everyday   Generally, my meals include foods like these fried meats, brats, burgers, french fries, pizza, cheese, chips, or ice cream A Few Times a Week   Eat fast food (like McDonalds, Burger Juan Carlos, Taco Bell) Less Than Weekly   Eat at a buffet or sit-down restaurant Less Than Weekly   Eat most of my meals in front of the TV or computer Less Than Weekly   Often skip meals, eat at random times, have no regular eating times Never   Rarely sit down for a meal but snack or graze throughout Never   Eat extra snacks between meals Less Than Weekly   Eat most of my food at the end of the day Never   Eat in the middle of the night or wake up at night to eat Never   Eat extra snacks to prevent or correct low blood sugar Less Than Weekly   Eat to prevent acid reflux or stomach pain Never   Worry about not having enough food to eat Never   I eat when I am depressed Never   I eat when I am stressed Once a Week   I eat when I am bored Less Than Weekly   I eat when I am anxious Never   I eat when I am happy or as a reward Never   I feel hungry all the time even if I just have eaten Never   Feeling full  is important to me A Few Times a Week   I finish all the food on my plate even if I am already full Never   I can't resist eating delicious food or walk past the good food/smell Never   I eat/snack without noticing that I am eating Never   I eat when I am preparing the meal Never   I eat more than usual when I see others eating Never   I have trouble not eating sweets, ice cream, cookies, or chips if they are around the house Less Than Weekly   I think about food all day Never   What foods, if any, do you crave? Sweets/Candy/Chocolate   Please list any other foods you crave? Chocolate     Meals not prepared at home per week: 1-2d        9/6/2023     1:16 PM   Amount of Food   I feel out of control when eating Never   I eat a large amount of food, like a loaf of bread, a box of cookies, a pint/quart of ice cream, all at once Never   I eat a large amount of food even when I am not hungry Never   I eat rapidly Never   I eat alone because I feel embarrassed and do not want others to see how much I have eaten Never   I eat until I am uncomfortably full Monthly   I feel bad, disgusted, or guilty after I overeat Monthly           9/6/2023     1:16 PM   Activity/Exercise History   How much of a typical 12 hour day do you spend sitting? Most of the Day   How much of a typical 12 hour day do you spend lying down? Less Than Half the Day   How much of a typical day do you spend walking/standing? Less Than Half the Day   How many hours (not including work) do you spend on the TV/Video Games/Computer/Tablet/Phone? 2-3 Hours   How many times a week are you active for the purpose of exercise? 4-5 TImes a Week   What keeps you from being more active? Lack of Time   How many total minutes do you spend doing some activity for the purpose of exercising when you exercise? More Than 30 Minutes       PAST MEDICAL HISTORY:  Past Medical History:   Diagnosis Date     NO ACTIVE PROBLEMS 01/08/2020 9/6/2023     1:16 PM   Work/Social  History Reviewed With Patient   My employment status is Full-Time   My job is    How much of your job is spent on the computer or phone? 100%   How many hours do you spend commuting to work daily? About 30 minues   What is your marital status? Single   Who do you live with? Significant other   Who does the food shopping? Both of us           9/6/2023     1:16 PM   Mental Health History Reviewed With Patient   Have you ever been physically or sexually abused? No   How often in the past 2 weeks have you felt little interest or pleasure in doing things? Not at all   Over the past 2 weeks how often have you felt down, depressed, or hopeless? Not at all           9/6/2023     1:16 PM   Sleep History Reviewed With Patient   How many hours do you sleep at night? 8       MEDICATIONS:   No current outpatient medications on file.       ALLERGIES:   No Known Allergies    ROS  General  Fatigue: no  HEENT  Hx of glaucoma: no  Vision changes: no  Cardiovascular  Hx of heart disease: no  Chest Pain with Exertion: no  Palpitations: no  Pulmonary  Shortness of breath at rest: no  Shortness of breath with exertion: no  Gastrointestinal  Heartburn: no  Pancreatitis: no  Psychiatric  Moods Stable: frustrated, stressed, some anxiety  Endocrine  Polydipsia: no  No personal or family history of medullary thyroid cancer: thyroid disease only  Neurologic  Hx of seizures: no  Migraine headaches: no    Birth control: natural planning  Kidney stones: no     PHYSICAL EXAM:  Wt Readings from Last 4 Encounters:   09/13/23 97.1 kg (214 lb)   05/16/23 97.1 kg (214 lb)   10/26/22 87.5 kg (193 lb)   03/01/21 79.8 kg (176 lb)      GEN: Alert and oriented in no acute distress.   HEENT: mucous membranes moist  CV: RRR no MRG  ABDOMEN: mild protuberance     FOLLOW-UP:   in 1 month with the dietician and in 3 months with myself     Total time spent on the date of this encounter doing: chart review, review of test results, patient  visit, physical exam, education, counseling, developing plan of care and documenting = 62 minutes.     Sincerely,    SANTOS Edge MD            Again, thank you for allowing me to participate in the care of your patient.        Sincerely,        SANTOS Edge MD

## 2023-09-14 DIAGNOSIS — E66.01 MORBID OBESITY (H): Primary | ICD-10-CM

## 2023-09-14 RX ORDER — PHENTERMINE HYDROCHLORIDE 37.5 MG/1
TABLET ORAL
Qty: 90 TABLET | Refills: 0 | Status: SHIPPED | OUTPATIENT
Start: 2023-09-14 | End: 2023-12-17

## 2023-09-19 ENCOUNTER — VIRTUAL VISIT (OUTPATIENT)
Dept: SURGERY | Facility: CLINIC | Age: 23
End: 2023-09-19
Payer: COMMERCIAL

## 2023-09-19 DIAGNOSIS — E66.01 CLASS 2 SEVERE OBESITY DUE TO EXCESS CALORIES WITH SERIOUS COMORBIDITY AND BODY MASS INDEX (BMI) OF 36.0 TO 36.9 IN ADULT (H): Primary | ICD-10-CM

## 2023-09-19 DIAGNOSIS — Z71.3 NUTRITIONAL COUNSELING: ICD-10-CM

## 2023-09-19 DIAGNOSIS — E66.812 CLASS 2 SEVERE OBESITY DUE TO EXCESS CALORIES WITH SERIOUS COMORBIDITY AND BODY MASS INDEX (BMI) OF 36.0 TO 36.9 IN ADULT (H): Primary | ICD-10-CM

## 2023-09-19 PROCEDURE — 97803 MED NUTRITION INDIV SUBSEQ: CPT | Mod: VID | Performed by: DIETITIAN, REGISTERED

## 2023-09-19 NOTE — PROGRESS NOTES
Phoebe Mroris is a 22 year old who is being evaluated via a billable video visit.      How would you like to obtain your AVS? MyChart  If the video visit is dropped, the invitation should be resent by: Send to e-mail at: [email or web address]  Will anyone else be joining your video visit? No          Medical Weight Loss Initial Diet Evaluation  Assessment:  This patient was referred by Dr. Edge for MNT as treatment for Obesity.       Phoebe is presenting today for a new weight management nutrition consultation. Pt has had an initial appointment with Dr. Edge.    Weight loss medication: Phentermine.       Anthropometrics:    Pt's weight is 213 lbs   Initial weight: 214 lbs   Weight change: 1 lb down  BMI: There is no height or weight on file to calculate BMI.   Ideal body weight: 54.7 kg (120 lb 9.5 oz)  Adjusted ideal body weight: 71.6 kg (157 lb 15.3 oz)    Estimated RMR (Hudson-St Jeor equation):  1703 kcals x 1.3 (light active) = 2214 kcals (for weight maintenance)    Recommended Protein Intake: 60-80 grams of protein/day    Medical History:  Patient Active Problem List   Diagnosis    Mirena placed 1/21/2020-remove 1/21/2025      Diabetes: No  HbA1c:  No results found for: HGBA1C    Nutrition History:   Food allergies/intolerances/cultural or religous food customs: No     Vitamins/Mineral Supplementation: Vitamin D, Mg     Dietary Recall:  Breakfast: overnight oats (oats, greek yogurt, scoop of protein, collagen, milk)   Lunch: ground turkey, rice, and green beans (has been meal prepping)  Dinner: similar to lunches or eggs with al and hashbrowns or potatoes  Typical Snacks: not typically, occasional SF Jell-O or SF pudding or Fiber One Bar or Protein Shake   Overnight eating: No  Eating out: 0-2 times/week     Beverages: Protein Shake, water, SF Sweet Tea, Diet Pop (Dr Pepper Zero or Coke Zero) once daily    Exercise: Weight Training 4 times/week, Walking-striving 8000 steps/day (aiming  for)    Nutrition Diagnosis (PES statement):     Obesity related to excessive energy intake as evidence by subjective statements and BMI of 36.6 kg/m2        Nutrition Intervention  Food and/or Nutrient Delivery   Placed emphasis on importance of developing a healthy meal routine, aiming for 3 meals a day and no snacks.  Discussed using a protein supplement as a meal replacement.    Nutrition Education   Discussed with patient how to build a meal: the importance of including a lean/low fat protein at each meal, include a source of vegetables at a minimum of lunch and dinner and limiting carbohydrate intake to <25% per meal.  Educated on sources of lean protein, portion sizes, the amount of grams found in each source. Recommend patient to aim for 20-30g protein at each meal.  Educated on how to read a food label: keeping total fat <10g and sugar <10g per serving.  Discussed the importance of adequate hydration, with emphasis on drinking 64oz of water or zero calorie beverages per day.    Nutrition Counseling   Encouraged importance of developing routine exercise for health benefits and weight loss.      Goals established by patient:   Track intake via food journal, aiming for 2752-0594 calories/day.   Focus on protein first, aiming for 100 grams of protein/day.     Handouts provided:  Non Surgical Weight Loss packet  Snack Ideas  Quick and Easy Meals  Recipe Resources    Assessment/Plan:    Pt will follow up in 4 month(s) with bariatrician and 1 month(s) with dietitian.         Video-Visit Details    Type of service:  Video Visit    Video Start Time (time video started): 12:46 pm     Video End Time (time video stopped): 1:04 pm     Originating Location (pt. Location): Home      Distant Location (provider location):  Off-site    Mode of Communication:  Video Conference via [facility]    Physician has received verbal consent for a Video Visit from the patient? Yes      Noelle Paulson, ADRIA

## 2023-09-19 NOTE — LETTER
9/19/2023         RE: Phoebe Morris  [address]  [facility] [address]        Dear Colleague,    Thank you for referring your patient, Phoebe Morris, to the [facility] SURGERY CLINIC AND BARIATRICS CARE [place]. Please see a copy of my visit note below.    Phoebe Morris is a 22 year old who is being evaluated via a billable video visit.      How would you like to obtain your AVS? MyChart  If the video visit is dropped, the invitation should be resent by: Send to e-mail at: [email or web address]  Will anyone else be joining your video visit? No          Medical Weight Loss Initial Diet Evaluation  Assessment:  This patient was referred by Dr. Edge for MNT as treatment for Obesity.       Phoebe is presenting today for a new weight management nutrition consultation. Pt has had an initial appointment with Dr. Edge.    Weight loss medication: Phentermine.       Anthropometrics:    Pt's weight is 213 lbs   Initial weight: 214 lbs   Weight change: 1 lb down  BMI: There is no height or weight on file to calculate BMI.   Ideal body weight: 54.7 kg (120 lb 9.5 oz)  Adjusted ideal body weight: 71.6 kg (157 lb 15.3 oz)    Estimated RMR (Brunswick-St Jeor equation):  1703 kcals x 1.3 (light active) = 2214 kcals (for weight maintenance)    Recommended Protein Intake: 60-80 grams of protein/day    Medical History:  Patient Active Problem List   Diagnosis     Mirena placed 1/21/2020-remove 1/21/2025      Diabetes: No  HbA1c:  No results found for: HGBA1C    Nutrition History:   Food allergies/intolerances/cultural or religous food customs: No     Vitamins/Mineral Supplementation: Vitamin D, Mg     Dietary Recall:  Breakfast: overnight oats (oats, greek yogurt, scoop of protein, collagen, milk)   Lunch: ground turkey, rice, and green beans (has been meal prepping)  Dinner: similar to lunches or eggs with al and hashbrowns or potatoes  Typical Snacks: not typically, occasional SF Jell-O or SF  pudding or Fiber One Bar or Protein Shake   Overnight eating: No  Eating out: 0-2 times/week     Beverages: Protein Shake, water, SF Sweet Tea, Diet Pop (Dr Pepper Zero or Coke Zero) once daily    Exercise: Weight Training 4 times/week, Walking-striving 8000 steps/day (aiming for)    Nutrition Diagnosis (PES statement):     Obesity related to excessive energy intake as evidence by subjective statements and BMI of 36.6 kg/m2        Nutrition Intervention  Food and/or Nutrient Delivery   Placed emphasis on importance of developing a healthy meal routine, aiming for 3 meals a day and no snacks.  Discussed using a protein supplement as a meal replacement.    Nutrition Education   Discussed with patient how to build a meal: the importance of including a lean/low fat protein at each meal, include a source of vegetables at a minimum of lunch and dinner and limiting carbohydrate intake to <25% per meal.  Educated on sources of lean protein, portion sizes, the amount of grams found in each source. Recommend patient to aim for 20-30g protein at each meal.  Educated on how to read a food label: keeping total fat <10g and sugar <10g per serving.  Discussed the importance of adequate hydration, with emphasis on drinking 64oz of water or zero calorie beverages per day.    Nutrition Counseling   Encouraged importance of developing routine exercise for health benefits and weight loss.      Goals established by patient:   Track intake via food journal, aiming for 9155-0194 calories/day.   Focus on protein first, aiming for 100 grams of protein/day.     Handouts provided:  Non Surgical Weight Loss packet  Snack Ideas  Quick and Easy Meals  Recipe Resources    Assessment/Plan:    Pt will follow up in 4 month(s) with bariatrician and 1 month(s) with dietitian.         Video-Visit Details    Type of service:  Video Visit    Video Start Time (time video started): 12:46 pm     Video End Time (time video stopped): 1:04 pm     Originating  Location (pt. Location): Home      Distant Location (provider location):  Off-site    Mode of Communication:  Video Conference via [facility]    Physician has received verbal consent for a Video Visit from the patient? Yes      Noelle Paulson RD        Again, thank you for allowing me to participate in the care of your patient.        Sincerely,        Noelle Paulson RD

## 2023-10-27 ENCOUNTER — VIRTUAL VISIT (OUTPATIENT)
Dept: SURGERY | Facility: CLINIC | Age: 23
End: 2023-10-27
Payer: COMMERCIAL

## 2023-10-27 DIAGNOSIS — E66.01 CLASS 2 SEVERE OBESITY DUE TO EXCESS CALORIES WITH SERIOUS COMORBIDITY AND BODY MASS INDEX (BMI) OF 35.0 TO 35.9 IN ADULT (H): Primary | ICD-10-CM

## 2023-10-27 DIAGNOSIS — E66.812 CLASS 2 SEVERE OBESITY DUE TO EXCESS CALORIES WITH SERIOUS COMORBIDITY AND BODY MASS INDEX (BMI) OF 35.0 TO 35.9 IN ADULT (H): Primary | ICD-10-CM

## 2023-10-27 DIAGNOSIS — Z71.3 NUTRITIONAL COUNSELING: ICD-10-CM

## 2023-10-27 PROCEDURE — 97803 MED NUTRITION INDIV SUBSEQ: CPT | Mod: 95 | Performed by: DIETITIAN, REGISTERED

## 2023-10-27 NOTE — LETTER
10/27/2023         RE: Phoebe Morris  [address]  [facility] [address]        Dear Colleague,    Thank you for referring your patient, Phoebe Morris, to the [facility] SURGERY CLINIC AND BARIATRICS CARE [place]. Please see a copy of my visit note below.    Phoebe Morris is a 22 year old who is being evaluated via a billable telephone visit.      What phone number would you like to be contacted at? [phone]  How would you like to obtain your AVS? [facility]    Medical  Weight Loss Follow-Up Diet Evaluation  Assessment:  Pheobe is presenting today for a follow up weight management nutrition consultation.  This patient has had an initial appointment and was referred by Dr. Edge for MNT as treatment for Obesity     Weight loss medication: Phentermine.   Pt's weight is 203.6 lbs   Initial weight: 214 lbs   Weight change: 10.4 lbs (down)          No data to display              BMI: There is no height or weight on file to calculate BMI.  Ideal body weight: 54.7 kg (120 lb 9.5 oz)  Adjusted ideal body weight: 71.6 kg (157 lb 15.3 oz)    Estimated RMR (Karnes-St Jeor equation):   1660 kcals x 1.3 (light active) = 2158 kcals (for weight maintenance)     Recommended Protein Intake: 60-80 grams of protein/day  Patient Active Problem List:  Patient Active Problem List   Diagnosis     Mirena placed 1/21/2020-remove 1/21/2025     Progress on goals from last visit: Has been tracking her intake via food journal, aiming for around 3034-6718 calories/day.  Has found that the medication is helping in regards to snacking along with portion control.  Has been working on protein first at each meal, noting that it is OK at this point, averaging around 90 grams of protein/day.  Has been working on increased water consumption, aiming for at least 48 oz/day, noting that she has more cotton mouth from the medication.     Exercise: focusing on increased steps throughout the day, averaging 8000 steps/day, most  days of the week, Pilates Videos a couple times over the past month  Nutrition Diagnosis:    Obesity (NC 3.3) related to overeating and poor lifestyle habits as evidenced by patient's subjective statements and BMI of 35 kg/m2.       Intervention:  Food and/or nutrient delivery: balanced meals, adequate protein   Nutrition education: none  Nutrition counseling: provided support and encouragement      Monitoring/Evaluation:    Goals:  Continue to track intake via food journal sruthi, aiming for 0223-0168 calories and  grams of protein/day.   Work on increased water consumption, aiming for at least 64 oz/day.   Work on re-starting weights for exercise in addition to cardio, starting out small and slowly building.     Patient to follow up in 3 month(s) with bariatrician and 2 month(s) with RD        Phone call duration: 15 minutes      Originating Location (pt. Location): Home      Distant Location (provider location):  Off-site        Noelle Paulson RD          Again, thank you for allowing me to participate in the care of your patient.        Sincerely,        Noelle Paulson RD

## 2023-10-27 NOTE — PROGRESS NOTES
Phoebe Morris is a 22 year old who is being evaluated via a billable telephone visit.      What phone number would you like to be contacted at? [phone]  How would you like to obtain your AVS? [facility]    Medical  Weight Loss Follow-Up Diet Evaluation  Assessment:  Phoebe is presenting today for a follow up weight management nutrition consultation.  This patient has had an initial appointment and was referred by Dr. Edge for MNT as treatment for Obesity     Weight loss medication: Phentermine.   Pt's weight is 203.6 lbs   Initial weight: 214 lbs   Weight change: 10.4 lbs (down)          No data to display              BMI: There is no height or weight on file to calculate BMI.  Ideal body weight: 54.7 kg (120 lb 9.5 oz)  Adjusted ideal body weight: 71.6 kg (157 lb 15.3 oz)    Estimated RMR (Greer-St Jeor equation):   1660 kcals x 1.3 (light active) = 2158 kcals (for weight maintenance)     Recommended Protein Intake: 60-80 grams of protein/day  Patient Active Problem List:  Patient Active Problem List   Diagnosis    Mirena placed 1/21/2020-remove 1/21/2025     Progress on goals from last visit: Has been tracking her intake via food journal, aiming for around 9944-5627 calories/day.  Has found that the medication is helping in regards to snacking along with portion control.  Has been working on protein first at each meal, noting that it is OK at this point, averaging around 90 grams of protein/day.  Has been working on increased water consumption, aiming for at least 48 oz/day, noting that she has more cotton mouth from the medication.     Exercise: focusing on increased steps throughout the day, averaging 8000 steps/day, most days of the week, Pilates Videos a couple times over the past month  Nutrition Diagnosis:    Obesity (NC 3.3) related to overeating and poor lifestyle habits as evidenced by patient's subjective statements and BMI of 35 kg/m2.       Intervention:  Food and/or nutrient delivery:  balanced meals, adequate protein   Nutrition education: none  Nutrition counseling: provided support and encouragement      Monitoring/Evaluation:    Goals:  Continue to track intake via food journal sruthi, aiming for 4932-8824 calories and  grams of protein/day.   Work on increased water consumption, aiming for at least 64 oz/day.   Work on re-starting weights for exercise in addition to cardio, starting out small and slowly building.     Patient to follow up in 3 month(s) with bariatrician and 2 month(s) with RD        Phone call duration: 15 minutes      Originating Location (pt. Location): Home      Distant Location (provider location):  Off-site        Noelle Paulson RD

## 2023-12-17 ENCOUNTER — MYC REFILL (OUTPATIENT)
Dept: SURGERY | Facility: CLINIC | Age: 23
End: 2023-12-17
Payer: COMMERCIAL

## 2023-12-17 DIAGNOSIS — E66.01 MORBID OBESITY (H): ICD-10-CM

## 2023-12-18 RX ORDER — PHENTERMINE HYDROCHLORIDE 37.5 MG/1
TABLET ORAL
Qty: 90 TABLET | Refills: 0 | Status: SHIPPED | OUTPATIENT
Start: 2023-12-18 | End: 2024-05-30

## 2024-01-08 NOTE — PROGRESS NOTES
Bariatric Care Clinic Non Surgical Follow up Visit   Date of visit: 1/10/2024  Physician: SANTOS Edge MD, MD  Primary Care is Edita Parrish  Phoebe Morris   23 year old  female     Initial Weight: 214#  Initial BMI: 36.7  Today's Weight:   Wt Readings from Last 1 Encounters:   01/10/24 88 kg (194 lb)     Body mass index is 33.3 kg/m .           Assessment and Plan   Assessment: Phoebe is a 23 year old year old female who presents for medical weight management.      Plan:    1. Class 2 severe obesity due to excess calories with serious comorbidity and body mass index (BMI) of 33.0 to 33.9 in adult (H)  Patient was congratulated on her success thus far. Healthy habits to assist with further weight loss were discussed. She will focus on getting adequate protein and will continue her current gym schedule. She will continue the phentermine.  2. Chronic back pain, unspecified back location, unspecified back pain laterality  This may improve with healthy habits and weight loss.      Follow up in 3 months with myself            INTERIM HISTORY  Patient was unable to find initial doses of wegovy in stock so she started phentermine. She states that it is really helping to control her appetite. She has noticed on her apple watch that both her resting and active heart rate has been elevated (up to 70) it was 50s before starting it.     DIETARY HISTORY  Meals Per Day: 2-3  Eating Protein First?: working on it  Food Diary: B:oatmeal L:bowls with veggies, potatoes and chicken, usually protein and carb and vegetable D:smaller meal, sometimes just a piece of fruit, sometimes protein and starch and vegetables  Snacks Per Day: decreased  Fluid Intake: 80 oz  Portion Control: yes  Calorie Containing Beverages: none  Meals at Restaurant per week:1-3    Positive Changes Since Last Visit: She is more mindful of what she is eating, portions are down  Struggling With: staying motivated to go to the gym (but she has been doing  "it), making sure she is getting enough protein    Knowledgeable in Reading Food Labels: yes  Getting Adequate Protein: working on it  Sleeping 7-8 hours/day yes  Stress management not discussed    PHYSICAL ACTIVITY PATTERNS:  Strength training 4 days per week, cardio 4 days per week, 8000 steps daily    REVIEW OF SYSTEMS  GENERAL/CONSTITUTIONAL:  Fatigue: no  HEENT:   glaucoma: no  CARDIOVASCULAR:  History of heart disease: no  GI:  Pancreatitis: no  NEUROLOGIC:  Paresthesias: no  History of migraine headaches: no  PSYCHIATRIC:  Moods: stable  ENDOCRINE:  Monitoring Blood Sugars: no  Sugars Well Controlled: na  No personal or family history of medullary thyroid cancer no  :  Birth control: condoms  History of kidney stones: no     Patient Profile   Social History     Social History Narrative    Not on file        Past Medical History   Past Medical History:   Diagnosis Date    NO ACTIVE PROBLEMS 01/08/2020     Patient Active Problem List   Diagnosis    Mirena placed 1/21/2020-remove 1/21/2025       Past Surgical History  She has a past surgical history that includes no history of surgery (01/08/2020).     Examination   Ht 1.626 m (5' 4\")   Wt 88 kg (194 lb)   BMI 33.30 kg/m    Wt Readings from Last 4 Encounters:   01/10/24 88 kg (194 lb)   09/13/23 97.1 kg (214 lb)   05/16/23 97.1 kg (214 lb)   10/26/22 87.5 kg (193 lb)      BP Readings from Last 3 Encounters:   09/13/23 122/82   05/16/23 130/84   10/26/22 114/62    GENERAL: Healthy, alert and no distress  EYES: Eyes grossly normal to inspection.  No discharge or erythema, or obvious scleral/conjunctival abnormalities.  RESP: No audible wheeze, cough, or visible cyanosis.  No visible retractions or increased work of breathing.    SKIN: Visible skin clear. No significant rash, abnormal pigmentation or lesions.  NEURO: Cranial nerves grossly intact.  Mentation and speech appropriate for age.  PSYCH: Mentation appears normal, affect normal/bright, judgement and " insight intact, normal speech and appearance well-groomed.        Counseling:   We reviewed the important post op bariatric recommendations:  -eating 3 meals daily  -eating protein first, getting >60gm protein daily  -eating slowly, chewing food well  -avoiding/limiting calorie containing beverages  -limiting starchy vegetables and carbohydrates, choosing wheat, not white with breads,   crackers, pastas, samaria, bagels, tortillas, rice  -limiting restaurant or cafeteria eating to twice a week or less    We discussed the importance of restorative sleep and stress management in maintaining a healthy weight.  We discussed the National Weight Control Registry healthy weight maintenance strategies and ways to optimize metabolism.  We discussed the importance of physical activity including cardiovascular and strength training in maintaining a healthier weight.    Total time spent on the date of this encounter doing: chart review, review of test results, patient visit, physical exam, education, counseling, developing plan of care and documenting = 32 minutes.         SANTOS Edge MD  [facility]

## 2024-01-10 ENCOUNTER — VIRTUAL VISIT (OUTPATIENT)
Dept: SURGERY | Facility: CLINIC | Age: 24
End: 2024-01-10
Payer: COMMERCIAL

## 2024-01-10 VITALS — BODY MASS INDEX: 33.12 KG/M2 | WEIGHT: 194 LBS | HEIGHT: 64 IN

## 2024-01-10 DIAGNOSIS — G89.29 CHRONIC BACK PAIN, UNSPECIFIED BACK LOCATION, UNSPECIFIED BACK PAIN LATERALITY: ICD-10-CM

## 2024-01-10 DIAGNOSIS — E66.811 OBESITY (BMI 30.0-34.9): Primary | ICD-10-CM

## 2024-01-10 DIAGNOSIS — M54.9 CHRONIC BACK PAIN, UNSPECIFIED BACK LOCATION, UNSPECIFIED BACK PAIN LATERALITY: ICD-10-CM

## 2024-01-10 PROCEDURE — 99214 OFFICE O/P EST MOD 30 MIN: CPT | Mod: 95 | Performed by: FAMILY MEDICINE

## 2024-01-10 ASSESSMENT — PAIN SCALES - GENERAL: PAINLEVEL: NO PAIN (0)

## 2024-01-10 NOTE — PATIENT INSTRUCTIONS

## 2024-01-10 NOTE — LETTER
1/10/2024         RE: Phoebe Morris  [address]  [place] [address]        Dear Colleague,    Thank you for referring your patient, Phoebe Morris, to the [facility] [place]. Please see a copy of my visit note below.    Bariatric Care Clinic Non Surgical Follow up Visit   Date of visit: 1/10/2024  Physician: SANTOS Edge MD, MD  Primary Care is Edita Parrish.  Phoebe Morris   23 year old  female     Initial Weight: 214#  Initial BMI: 36.7  Today's Weight:   Wt Readings from Last 1 Encounters:   01/10/24 88 kg (194 lb)     Body mass index is 33.3 kg/m .           Assessment and Plan   Assessment: Phoebe is a 23 year old year old female who presents for medical weight management.      Plan:    1. Class 2 severe obesity due to excess calories with serious comorbidity and body mass index (BMI) of 33.0 to 33.9 in adult (H)  Patient was congratulated on her success thus far. Healthy habits to assist with further weight loss were discussed. She will focus on getting adequate protein and will continue her current gym schedule. She will continue the phentermine.  2. Chronic back pain, unspecified back location, unspecified back pain laterality  This may improve with healthy habits and weight loss.      Follow up in 3 months with myself            INTERIM HISTORY  Patient was unable to find initial doses of wegovy in stock so she started phentermine. She states that it is really helping to control her appetite. She has noticed on her apple watch that both her resting and active heart rate has been elevated (up to 70) it was 50s before starting it.     DIETARY HISTORY  Meals Per Day: 2-3  Eating Protein First?: working on it  Food Diary: B:oatmeal L:bowls with veggies, potatoes and chicken, usually protein and carb and vegetable D:smaller meal, sometimes just a piece of fruit, sometimes protein and starch and vegetables  Snacks Per Day: decreased  Fluid  "Intake: 80 oz  Portion Control: yes  Calorie Containing Beverages: none  Meals at Restaurant per week:1-3    Positive Changes Since Last Visit: She is more mindful of what she is eating, portions are down  Struggling With: staying motivated to go to the gym (but she has been doing it), making sure she is getting enough protein    Knowledgeable in Reading Food Labels: yes  Getting Adequate Protein: working on it  Sleeping 7-8 hours/day yes  Stress management not discussed    PHYSICAL ACTIVITY PATTERNS:  Strength training 4 days per week, cardio 4 days per week, 8000 steps daily    REVIEW OF SYSTEMS  GENERAL/CONSTITUTIONAL:  Fatigue: no  HEENT:   glaucoma: no  CARDIOVASCULAR:  History of heart disease: no  GI:  Pancreatitis: no  NEUROLOGIC:  Paresthesias: no  History of migraine headaches: no  PSYCHIATRIC:  Moods: stable  ENDOCRINE:  Monitoring Blood Sugars: no  Sugars Well Controlled: na  No personal or family history of medullary thyroid cancer no  :  Birth control: condoms  History of kidney stones: no     Patient Profile   Social History     Social History Narrative     Not on file        Past Medical History   Past Medical History:   Diagnosis Date     NO ACTIVE PROBLEMS 01/08/2020     Patient Active Problem List   Diagnosis     Mirena placed 1/21/2020-remove 1/21/2025       Past Surgical History  She has a past surgical history that includes no history of surgery (01/08/2020).     Examination   Ht 1.626 m (5' 4\")   Wt 88 kg (194 lb)   BMI 33.30 kg/m    Wt Readings from Last 4 Encounters:   01/10/24 88 kg (194 lb)   09/13/23 97.1 kg (214 lb)   05/16/23 97.1 kg (214 lb)   10/26/22 87.5 kg (193 lb)      BP Readings from Last 3 Encounters:   09/13/23 122/82   05/16/23 130/84   10/26/22 114/62    GENERAL: Healthy, alert and no distress  EYES: Eyes grossly normal to inspection.  No discharge or erythema, or obvious scleral/conjunctival abnormalities.  RESP: No audible wheeze, cough, or visible cyanosis.  No " visible retractions or increased work of breathing.    SKIN: Visible skin clear. No significant rash, abnormal pigmentation or lesions.  NEURO: Cranial nerves grossly intact.  Mentation and speech appropriate for age.  PSYCH: Mentation appears normal, affect normal/bright, judgement and insight intact, normal speech and appearance well-groomed.        Counseling:   We reviewed the important post op bariatric recommendations:  -eating 3 meals daily  -eating protein first, getting >60gm protein daily  -eating slowly, chewing food well  -avoiding/limiting calorie containing beverages  -limiting starchy vegetables and carbohydrates, choosing wheat, not white with breads,   crackers, pastas, samaria, bagels, tortillas, rice  -limiting restaurant or cafeteria eating to twice a week or less    We discussed the importance of restorative sleep and stress management in maintaining a healthy weight.  We discussed the National Weight Control Registry healthy weight maintenance strategies and ways to optimize metabolism.  We discussed the importance of physical activity including cardiovascular and strength training in maintaining a healthier weight.    Total time spent on the date of this encounter doing: chart review, review of test results, patient visit, physical exam, education, counseling, developing plan of care and documenting = 32 minutes.         SANTOS Edge MD  [facility]           Virtual Visit Details    Type of service:  Video Visit     Originating Location (pt. Location): Home    Distant Location (provider location):  Off-site  Platform used for Video Visit: DoubleDutch  Video start time: 8:41 am  Video end time: 8:57 am      Again, thank you for allowing me to participate in the care of your patient.        Sincerely,        SANTOS Edge MD

## 2024-01-10 NOTE — NURSING NOTE
Is the patient currently in the state of [place]? YES    Visit mode:VIDEO    If the visit is dropped, the patient can be reconnected by: VIDEO VISIT: Text to cell phone:   Telephone Information:   Mobile [phone]       Will anyone else be joining the visit? NO  (If patient encounters technical issues they should call [phone]    How would you like to obtain your AVS? MyChart    Are changes needed to the allergy or medication list? Pt stated no changes to meds/allergies.  Please remove any meds marked not taking and any flagged for removal.    Reason for visit: RECHECK    Wt/ht other than 24 hrs:    Pain more than one location:  no  Tania DUNBAR

## 2024-01-10 NOTE — PROGRESS NOTES
Virtual Visit Details    Type of service:  Video Visit     Originating Location (pt. Location): Home    Distant Location (provider location):  Off-site  Platform used for Video Visit: SnapSense  Video start time: 8:41 am  Video end time: 8:57 am

## 2024-04-30 NOTE — PROGRESS NOTES
Bariatric Care Clinic Non Surgical Follow up Visit   Date of visit: 5/1/2024  Physician: SANTOS Edge MD, MD  Primary Care is Edita Parrish  Phoebe Morris   23 year old  female     Initial Weight: 214#  Initial BMI: 36.7  Today's Weight:   Wt Readings from Last 1 Encounters:   05/01/24 82.1 kg (181 lb)     Body mass index is 31.07 kg/m .           Assessment and Plan   Assessment: Phoebe is a 23 year old year old female who presents for medical weight management.      Plan:    1. Obesity (BMI 30.0-34.9)  Patient was congratulated on her success thus far. Healthy habits to assist with further weight loss were discussed. She is on track with all habits. She doesn't feel she needs any medicine for appetite control at this point. If this changes, she will let me know and is aware that she needs to see me every 3 months if I am prescribing it.     2. Chronic back pain, unspecified back location, unspecified back pain laterality  This has improved with healthy habits and weight loss.      Follow up prn           INTERIM HISTORY  Patient stopped taking phentermine for appetite and craving control on March 23. She has been eating in a calorie deficit diet, focusing on protein and finds that she is not struggling with hunger. She is tracking on Shaka pal.    DIETARY HISTORY  Meals Per Day: 3  Eating Protein First?: yes  Food Diary: B:ground turkey sausage with egg whites and breakfast potatoes L:stir batres with ground beef and vegetables and small amount of rice D:just bare chicken nuggets, usually vegetables  Snacks Per Day: occasional  Typical Snack: protein shake or fiber one brownie or sugar free jello or fruit  Fluid Intake: 64 oz  Portion Control: yes  Calorie Containing Beverages: none  Choosing Whole Grains: sometimes  Meals at Restaurant per week:2-3    Positive Changes Since Last Visit: exercise  Struggling With: none    Knowledgeable in Reading Food Labels: yes  Getting Adequate Protein: yes  Sleeping  "7-8 hours/day yes  Stress management not discussed    PHYSICAL ACTIVITY PATTERNS:  Strength training 4 x per week, getting 10K + steps per day, walks    REVIEW OF SYSTEMS  GENERAL/CONSTITUTIONAL:  Fatigue: no  HEENT:   glaucoma: no  CARDIOVASCULAR:  History of heart disease: no  GI:  Pancreatitis: no  NEUROLOGIC:  Paresthesias: no  History of migraine headaches: no  PSYCHIATRIC:  Moods: happy with her weight  MUSCULOSKELETAL/RHEUMATOLOGIC  Arthralgias: none  Myalgias: none  ENDOCRINE:  Monitoring Blood Sugars: no  Sugars Well Controlled: na  No personal or family history of medullary thyroid cancer no  :  Birth control: natural planning  History of kidney stones: no     Patient Profile   Social History     Social History Narrative    Not on file        Past Medical History   Past Medical History:   Diagnosis Date    NO ACTIVE PROBLEMS 01/08/2020    Obesity      Patient Active Problem List   Diagnosis    Mirena placed 1/21/2020-remove 1/21/2025       Past Surgical History  She has a past surgical history that includes no history of surgery (01/08/2020).     Examination   Ht 1.626 m (5' 4\")   Wt 82.1 kg (181 lb)   BMI 31.07 kg/m    Wt Readings from Last 4 Encounters:   05/01/24 82.1 kg (181 lb)   01/10/24 88 kg (194 lb)   09/13/23 97.1 kg (214 lb)   05/16/23 97.1 kg (214 lb)      BP Readings from Last 3 Encounters:   09/13/23 122/82   05/16/23 130/84   10/26/22 114/62      GENERAL: alert and no distress  EYES: Eyes grossly normal to inspection.  No discharge or erythema, or obvious scleral/conjunctival abnormalities.  RESP: No audible wheeze, cough, or visible cyanosis.    SKIN: Visible skin clear. No significant rash, abnormal pigmentation or lesions.  NEURO: Cranial nerves grossly intact.  Mentation and speech appropriate for age.  PSYCH: Appropriate affect, tone, and pace of words        Counseling:   We reviewed the important post op bariatric recommendations:  -eating 3 meals daily  -eating protein first, " getting >60gm protein daily  -eating slowly, chewing food well  -avoiding/limiting calorie containing beverages  -limiting starchy vegetables and carbohydrates, choosing wheat, not white with breads,   crackers, pastas, samaria, bagels, tortillas, rice  -limiting restaurant or cafeteria eating to twice a week or less    We discussed the importance of restorative sleep and stress management in maintaining a healthy weight.  We discussed the National Weight Control Registry healthy weight maintenance strategies and ways to optimize metabolism.  We discussed the importance of physical activity including cardiovascular and strength training in maintaining a healthier weight.    Total time spent on the date of this encounter doing: chart review, review of test results, patient visit, physical exam, education, counseling, developing plan of care and documenting = 27  Vitals - Patient Reported  Pain Score: No Pain (0)                SANTOS Edge MD  [facility]

## 2024-05-01 ENCOUNTER — TELEPHONE (OUTPATIENT)
Dept: SURGERY | Facility: CLINIC | Age: 24
End: 2024-05-01

## 2024-05-01 ENCOUNTER — VIRTUAL VISIT (OUTPATIENT)
Dept: SURGERY | Facility: CLINIC | Age: 24
End: 2024-05-01
Payer: COMMERCIAL

## 2024-05-01 VITALS — BODY MASS INDEX: 30.9 KG/M2 | WEIGHT: 181 LBS | HEIGHT: 64 IN

## 2024-05-01 DIAGNOSIS — G89.29 CHRONIC BACK PAIN, UNSPECIFIED BACK LOCATION, UNSPECIFIED BACK PAIN LATERALITY: ICD-10-CM

## 2024-05-01 DIAGNOSIS — E66.811 OBESITY (BMI 30.0-34.9): Primary | ICD-10-CM

## 2024-05-01 DIAGNOSIS — M54.9 CHRONIC BACK PAIN, UNSPECIFIED BACK LOCATION, UNSPECIFIED BACK PAIN LATERALITY: ICD-10-CM

## 2024-05-01 PROCEDURE — 99213 OFFICE O/P EST LOW 20 MIN: CPT | Mod: 95 | Performed by: FAMILY MEDICINE

## 2024-05-01 ASSESSMENT — PAIN SCALES - GENERAL: PAINLEVEL: NO PAIN (0)

## 2024-05-01 NOTE — PROGRESS NOTES
Virtual Visit Details    Type of service:  Video Visit     Originating Location (pt. Location): Home    Distant Location (provider location):  Off-site  Platform used for Video Visit: Vozeeme  Video start time: 11:12 am  Video end time: 11:22 am

## 2024-05-01 NOTE — TELEPHONE ENCOUNTER
Spoke to pt to see is she wanted to make a follow up with  ( wrap up said follow up PRN). Pt states she will hold off for now and call when/if a follow up is needed.

## 2024-05-01 NOTE — NURSING NOTE
Is the patient currently in the state of [place]? YES    Visit mode:VIDEO    If the visit is dropped, the patient can be reconnected by: VIDEO VISIT: Text to cell phone:   Telephone Information:   Mobile [phone]       Will anyone else be joining the visit? NO  (If patient encounters technical issues they should call [phone]    How would you like to obtain your AVS? MyChart    Are changes needed to the allergy or medication list? Yes Pt no longer taking Phentermine.     Are refills needed on medications prescribed by this physician? NO     Reason for visit: RECHECK    Wt/ht other than 24 hrs:    Pain more than one location:  no  Tania BRADYF

## 2024-05-01 NOTE — LETTER
5/1/2024         RE: Phoebe Morris  [address]  [facility] [address]        Dear Colleague,    Thank you for referring your patient, Phoebe Morris, to the [facility] [place]. Please see a copy of my visit note below.    Bariatric Care Clinic Non Surgical Follow up Visit   Date of visit: 5/1/2024  Physician: SANTOS Edge MD, MD  Primary Care is Edita Parrish.  Phoebe Morris   23 year old  female     Initial Weight: 214#  Initial BMI: 36.7  Today's Weight:   Wt Readings from Last 1 Encounters:   05/01/24 82.1 kg (181 lb)     Body mass index is 31.07 kg/m .           Assessment and Plan   Assessment: Phoebe is a 23 year old year old female who presents for medical weight management.      Plan:    1. Obesity (BMI 30.0-34.9)  Patient was congratulated on her success thus far. Healthy habits to assist with further weight loss were discussed. She is on track with all habits. She doesn't feel she needs any medicine for appetite control at this point. If this changes, she will let me know and is aware that she needs to see me every 3 months if I am prescribing it.     2. Chronic back pain, unspecified back location, unspecified back pain laterality  This has improved with healthy habits and weight loss.      Follow up prn           INTERIM HISTORY  Patient stopped taking phentermine for appetite and craving control on March 23. She has been eating in a calorie deficit diet, focusing on protein and finds that she is not struggling with hunger. She is tracking on [facility] pal.    DIETARY HISTORY  Meals Per Day: 3  Eating Protein First?: yes  Food Diary: B:ground turkey sausage with egg whites and breakfast potatoes L:stir batres with ground beef and vegetables and small amount of rice D:just bare chicken nuggets, usually vegetables  Snacks Per Day: occasional  Typical Snack: protein shake or fiber one brownie or sugar free jello or fruit  Fluid Intake: 64  "oz  Portion Control: yes  Calorie Containing Beverages: none  Choosing Whole Grains: sometimes  Meals at Restaurant per week:2-3    Positive Changes Since Last Visit: exercise  Struggling With: none    Knowledgeable in Reading Food Labels: yes  Getting Adequate Protein: yes  Sleeping 7-8 hours/day yes  Stress management not discussed    PHYSICAL ACTIVITY PATTERNS:  Strength training 4 x per week, getting 10K + steps per day, walks    REVIEW OF SYSTEMS  GENERAL/CONSTITUTIONAL:  Fatigue: no  HEENT:   glaucoma: no  CARDIOVASCULAR:  History of heart disease: no  GI:  Pancreatitis: no  NEUROLOGIC:  Paresthesias: no  History of migraine headaches: no  PSYCHIATRIC:  Moods: happy with her weight  MUSCULOSKELETAL/RHEUMATOLOGIC  Arthralgias: none  Myalgias: none  ENDOCRINE:  Monitoring Blood Sugars: no  Sugars Well Controlled: na  No personal or family history of medullary thyroid cancer no  :  Birth control: natural planning  History of kidney stones: no     Patient Profile   Social History     Social History Narrative     Not on file        Past Medical History   Past Medical History:   Diagnosis Date     NO ACTIVE PROBLEMS 01/08/2020     Obesity      Patient Active Problem List   Diagnosis     Mirena placed 1/21/2020-remove 1/21/2025       Past Surgical History  She has a past surgical history that includes no history of surgery (01/08/2020).     Examination   Ht 1.626 m (5' 4\")   Wt 82.1 kg (181 lb)   BMI 31.07 kg/m    Wt Readings from Last 4 Encounters:   05/01/24 82.1 kg (181 lb)   01/10/24 88 kg (194 lb)   09/13/23 97.1 kg (214 lb)   05/16/23 97.1 kg (214 lb)      BP Readings from Last 3 Encounters:   09/13/23 122/82   05/16/23 130/84   10/26/22 114/62      GENERAL: alert and no distress  EYES: Eyes grossly normal to inspection.  No discharge or erythema, or obvious scleral/conjunctival abnormalities.  RESP: No audible wheeze, cough, or visible cyanosis.    SKIN: Visible skin clear. No significant rash, abnormal " pigmentation or lesions.  NEURO: Cranial nerves grossly intact.  Mentation and speech appropriate for age.  PSYCH: Appropriate affect, tone, and pace of words        Counseling:   We reviewed the important post op bariatric recommendations:  -eating 3 meals daily  -eating protein first, getting >60gm protein daily  -eating slowly, chewing food well  -avoiding/limiting calorie containing beverages  -limiting starchy vegetables and carbohydrates, choosing wheat, not white with breads,   crackers, pastas, samaria, bagels, tortillas, rice  -limiting restaurant or cafeteria eating to twice a week or less    We discussed the importance of restorative sleep and stress management in maintaining a healthy weight.  We discussed the National Weight Control Registry healthy weight maintenance strategies and ways to optimize metabolism.  We discussed the importance of physical activity including cardiovascular and strength training in maintaining a healthier weight.    Total time spent on the date of this encounter doing: chart review, review of test results, patient visit, physical exam, education, counseling, developing plan of care and documenting = 27  Vitals - Patient Reported  Pain Score: No Pain (0)                SANTOS Edge MD  [facility]           Virtual Visit Details    Type of service:  Video Visit     Originating Location (pt. Location): Home    Distant Location (provider location):  Off-site  Platform used for Video Visit: [email or web address]  Video start time: 11:12 am  Video end time: 11:22 am      Again, thank you for allowing me to participate in the care of your patient.        Sincerely,        SANTOS Edge MD

## 2024-05-30 ENCOUNTER — VIRTUAL VISIT (OUTPATIENT)
Dept: OBGYN | Facility: CLINIC | Age: 24
End: 2024-05-30
Payer: COMMERCIAL

## 2024-05-30 VITALS — BODY MASS INDEX: 31.07 KG/M2 | HEIGHT: 64 IN

## 2024-05-30 DIAGNOSIS — Z83.3 FAMILY HISTORY OF DIABETES MELLITUS IN FATHER: ICD-10-CM

## 2024-05-30 DIAGNOSIS — Z34.00 ENCOUNTER FOR SUPERVISION OF NORMAL FIRST PREGNANCY: Primary | ICD-10-CM

## 2024-05-30 PROBLEM — Z30.430 ENCOUNTER FOR INSERTION OF MIRENA IUD: Status: RESOLVED | Noted: 2020-01-21 | Resolved: 2024-05-30

## 2024-05-30 PROCEDURE — 99207 PR NO CHARGE NURSE ONLY: CPT | Mod: 93

## 2024-05-30 NOTE — PROGRESS NOTES
Important Information for Provider:     New ob nurse intake by phone, first pregnancy. Recommended B6, Unisom for nausea  Handouts reviewed. Discussed genetic screening. Ultrasound and NOB with Dr Huston 6/27/2024.   Caffeine intake/servings daily - 0  Calcium intake/servings daily - 3  Exercise 5 times weekly - describe ; strength training, walking, precautions given  Sunscreen used - Yes  Seatbelts used - Yes  Guns stored in the home - No  Self Breast Exam - Yes  Pap test up to date -  Yes  Dental exam up to date -  Yes    Immunizations reviewed and up to date - Yes  Abuse: Current or Past (Physical, Sexual or Emotional) - No  Do you feel safe in your environment - Yes  Do you cope well with stress - Yes      Prenatal OB Questionnaire  Patient supplied answers from flow sheet for:  Prenatal OB Questionnaire.  Past Medical History  Have you ever received care for your mental health? : No  Have you ever been in a major accident or suffered serious trauma?: No  Within the last year, has anyone hit, slapped, kicked or otherwise hurt you?: No  In the last year, has anyone forced you to have sex when you didn't want to?: No    Past Medical History 2   Have you ever received a blood transfusion?: No  Would you accept a blood transfusion if was medically recommended?: Yes  Does anyone in your home smoke?: No   Is your blood type Rh negative?: Unknown  Have you ever ?: No  Have you been hospitalized for a nonsurgical reason excluding normal delivery?: No  Have you ever had an abnormal pap smear?: No    Past Medical History (Continued)  Do you have a history of abnormalities of the uterus?: No  Did your mother take ALEX or any other hormones when she was pregnant with you?: Unknown  Do you have any other problems we have not asked about which you feel may be important to this pregnancy?: No                     Allergies as of 5/30/2024:    Allergies as of 05/30/2024    (No Known Allergies)               Early  ultrasound screening tool:    Does patient have irregular periods?  No  Did patient use hormonal birth control in the three months prior to positive urine pregnancy test? No  Is the patient breastfeeding?  No  Is the patient 10 weeks or greater at time of education visit?  No

## 2024-06-26 LAB
ABO/RH(D): NORMAL
ANTIBODY SCREEN: NEGATIVE
SPECIMEN EXPIRATION DATE: NORMAL

## 2024-06-27 ENCOUNTER — ANCILLARY PROCEDURE (OUTPATIENT)
Dept: ULTRASOUND IMAGING | Facility: CLINIC | Age: 24
End: 2024-06-27
Payer: COMMERCIAL

## 2024-06-27 ENCOUNTER — PRENATAL OFFICE VISIT (OUTPATIENT)
Dept: OBGYN | Facility: CLINIC | Age: 24
End: 2024-06-27
Payer: COMMERCIAL

## 2024-06-27 VITALS
BODY MASS INDEX: 32.79 KG/M2 | HEART RATE: 89 BPM | WEIGHT: 191 LBS | SYSTOLIC BLOOD PRESSURE: 116 MMHG | TEMPERATURE: 97.8 F | DIASTOLIC BLOOD PRESSURE: 76 MMHG | OXYGEN SATURATION: 100 %

## 2024-06-27 DIAGNOSIS — Z34.01 ENCOUNTER FOR SUPERVISION OF NORMAL FIRST PREGNANCY IN FIRST TRIMESTER: Primary | ICD-10-CM

## 2024-06-27 DIAGNOSIS — R11.0 NAUSEA: ICD-10-CM

## 2024-06-27 DIAGNOSIS — Z83.3 FAMILY HISTORY OF DIABETES MELLITUS IN FATHER: ICD-10-CM

## 2024-06-27 DIAGNOSIS — Z34.00 ENCOUNTER FOR SUPERVISION OF NORMAL FIRST PREGNANCY: ICD-10-CM

## 2024-06-27 LAB
ALBUMIN UR-MCNC: NEGATIVE MG/DL
APPEARANCE UR: CLEAR
BILIRUB UR QL STRIP: NEGATIVE
COLOR UR AUTO: YELLOW
ERYTHROCYTE [DISTWIDTH] IN BLOOD BY AUTOMATED COUNT: 12.4 % (ref 10–15)
GLUCOSE UR STRIP-MCNC: NEGATIVE MG/DL
HBA1C MFR BLD: 5.1 % (ref 0–5.6)
HBV SURFACE AG SERPL QL IA: NONREACTIVE
HCT VFR BLD AUTO: 40.5 % (ref 35–47)
HCV AB SERPL QL IA: NONREACTIVE
HGB BLD-MCNC: 13.8 G/DL (ref 11.7–15.7)
HGB UR QL STRIP: NEGATIVE
HIV 1+2 AB+HIV1 P24 AG SERPL QL IA: NONREACTIVE
KETONES UR STRIP-MCNC: NEGATIVE MG/DL
LEUKOCYTE ESTERASE UR QL STRIP: NEGATIVE
MCH RBC QN AUTO: 30.2 PG (ref 26.5–33)
MCHC RBC AUTO-ENTMCNC: 34.1 G/DL (ref 31.5–36.5)
MCV RBC AUTO: 89 FL (ref 78–100)
NITRATE UR QL: NEGATIVE
PH UR STRIP: 6 [PH] (ref 5–7)
PLATELET # BLD AUTO: 329 10E3/UL (ref 150–450)
RBC # BLD AUTO: 4.57 10E6/UL (ref 3.8–5.2)
RUBV IGG SERPL QL IA: 1.46 INDEX
RUBV IGG SERPL QL IA: POSITIVE
SP GR UR STRIP: <=1.005 (ref 1–1.03)
T PALLIDUM AB SER QL: NONREACTIVE
UROBILINOGEN UR STRIP-ACNC: 0.2 E.U./DL
WBC # BLD AUTO: 11.5 10E3/UL (ref 4–11)

## 2024-06-27 PROCEDURE — 86762 RUBELLA ANTIBODY: CPT | Performed by: OBSTETRICS & GYNECOLOGY

## 2024-06-27 PROCEDURE — 87086 URINE CULTURE/COLONY COUNT: CPT | Performed by: OBSTETRICS & GYNECOLOGY

## 2024-06-27 PROCEDURE — 99213 OFFICE O/P EST LOW 20 MIN: CPT | Performed by: OBSTETRICS & GYNECOLOGY

## 2024-06-27 PROCEDURE — 87389 HIV-1 AG W/HIV-1&-2 AB AG IA: CPT | Performed by: OBSTETRICS & GYNECOLOGY

## 2024-06-27 PROCEDURE — 87340 HEPATITIS B SURFACE AG IA: CPT | Performed by: OBSTETRICS & GYNECOLOGY

## 2024-06-27 PROCEDURE — 86900 BLOOD TYPING SEROLOGIC ABO: CPT | Performed by: OBSTETRICS & GYNECOLOGY

## 2024-06-27 PROCEDURE — 76801 OB US < 14 WKS SINGLE FETUS: CPT | Performed by: OBSTETRICS & GYNECOLOGY

## 2024-06-27 PROCEDURE — 85027 COMPLETE CBC AUTOMATED: CPT | Performed by: OBSTETRICS & GYNECOLOGY

## 2024-06-27 PROCEDURE — 86803 HEPATITIS C AB TEST: CPT | Performed by: OBSTETRICS & GYNECOLOGY

## 2024-06-27 PROCEDURE — 86850 RBC ANTIBODY SCREEN: CPT | Performed by: OBSTETRICS & GYNECOLOGY

## 2024-06-27 PROCEDURE — 36415 COLL VENOUS BLD VENIPUNCTURE: CPT | Performed by: OBSTETRICS & GYNECOLOGY

## 2024-06-27 PROCEDURE — 86780 TREPONEMA PALLIDUM: CPT | Performed by: OBSTETRICS & GYNECOLOGY

## 2024-06-27 PROCEDURE — 76817 TRANSVAGINAL US OBSTETRIC: CPT | Performed by: OBSTETRICS & GYNECOLOGY

## 2024-06-27 PROCEDURE — 81003 URINALYSIS AUTO W/O SCOPE: CPT | Performed by: OBSTETRICS & GYNECOLOGY

## 2024-06-27 PROCEDURE — 86901 BLOOD TYPING SEROLOGIC RH(D): CPT | Performed by: OBSTETRICS & GYNECOLOGY

## 2024-06-27 PROCEDURE — 83036 HEMOGLOBIN GLYCOSYLATED A1C: CPT | Performed by: OBSTETRICS & GYNECOLOGY

## 2024-06-27 RX ORDER — ONDANSETRON 4 MG/1
4 TABLET, FILM COATED ORAL EVERY 8 HOURS PRN
Qty: 30 TABLET | Refills: 5 | Status: SHIPPED | OUTPATIENT
Start: 2024-06-27

## 2024-06-27 ASSESSMENT — ANXIETY QUESTIONNAIRES
2. NOT BEING ABLE TO STOP OR CONTROL WORRYING: NOT AT ALL
3. WORRYING TOO MUCH ABOUT DIFFERENT THINGS: NOT AT ALL
6. BECOMING EASILY ANNOYED OR IRRITABLE: NOT AT ALL
1. FEELING NERVOUS, ANXIOUS, OR ON EDGE: NOT AT ALL
7. FEELING AFRAID AS IF SOMETHING AWFUL MIGHT HAPPEN: NOT AT ALL
GAD7 TOTAL SCORE: 0
GAD7 TOTAL SCORE: 0
5. BEING SO RESTLESS THAT IT IS HARD TO SIT STILL: NOT AT ALL
IF YOU CHECKED OFF ANY PROBLEMS ON THIS QUESTIONNAIRE, HOW DIFFICULT HAVE THESE PROBLEMS MADE IT FOR YOU TO DO YOUR WORK, TAKE CARE OF THINGS AT HOME, OR GET ALONG WITH OTHER PEOPLE: NOT DIFFICULT AT ALL

## 2024-06-27 ASSESSMENT — PATIENT HEALTH QUESTIONNAIRE - PHQ9
5. POOR APPETITE OR OVEREATING: NOT AT ALL
SUM OF ALL RESPONSES TO PHQ QUESTIONS 1-9: 1

## 2024-06-27 NOTE — PATIENT INSTRUCTIONS
Visits every 4 weeks until 28-20w when you start coming every 2w.  Then weekly at 36w    Weeks 10 to 14 of Your Pregnancy: Care Instructions  It's now possible to hear the fetus's heartbeat with a special ultrasound device. And the fetus's organs are developing.    Decide about tests to check for birth defects. Think about your age, your chance of passing on a family disease, your need to know about any problems, and what you might do after you have the test results.    It's okay to exercise. Try activities such as walking or swimming. Check with your doctor before starting a new program.    You may feel more tired than usual.  Taking naps during the day may help.     You may feel emotional.  It might help to talk to someone.     You may have headaches.  Try lying down and putting a cool cloth over your forehead.     You can use acetaminophen (Tylenol) for pain relief.  Don't take any anti-inflammatory medicines (such as Advil, Motrin, Aleve), unless your doctor says it's okay.     You may feel a fullness or aching in your lower belly.  This can feel like the kind of cramps you might get before a period. A back rub may help.     You may need to urinate more.  Your growing uterus and changing hormones can affect your bladder.     You may feel sick to your stomach (morning sickness).  Try avoiding food and smells that make you feel sick.     Your breasts may feel different.  They may feel tender or get bigger. Your nipples may get darker. Try a bra that gives you good support.     Avoid alcohol, tobacco, and drugs (including marijuana).  If you need help quitting, talk to your doctor.     Take a daily prenatal vitamin.  Choose one with folic acid.   Follow-up care is a key part of your treatment and safety. Be sure to make and go to all appointments, and call your doctor if you are having problems. It's also a good idea to know your test results and keep a list of the medicines you take.  Where can you learn more?  Go  "to https://www.[employer].net/patiented  Enter E090 in the search box to learn more about \"Weeks 10 to 14 of Your Pregnancy: Care Instructions.\"  Current as of: July 10, 2023               Content Version: 14.0    4441-9995 [employer].   Care instructions adapted under license by your healthcare professional. If you have questions about a medical condition or this instruction, always ask your healthcare professional. [employer] disclaims any warranty or liability for your use of this information.        Nutrition During Pregnancy: Care Instructions  Overview     Healthy eating when you are pregnant is important for you and your baby. It can help you feel well and have a successful pregnancy and delivery. During pregnancy your nutrition needs increase. Even if you have excellent eating habits, your doctor may recommend a multivitamin to make sure you get enough iron and folic acid.  You may wonder how much weight you should gain. In general, if you were at a healthy weight before you became pregnant, then you should gain between 25 and 35 pounds. If you were overweight before pregnancy, then you'll likely be advised to gain 15 to 25 pounds. If you were underweight before pregnancy, then you'll probably be advised to gain 28 to 40 pounds. Your doctor will work with you to set a weight goal that is right for you. Gaining a healthy amount of weight helps you have a healthy baby.  Follow-up care is a key part of your treatment and safety. Be sure to make and go to all appointments, and call your doctor if you are having problems. It's also a good idea to know your test results and keep a list of the medicines you take.  How can you care for yourself at home?  Eat plenty of fruits and vegetables. Include a variety of orange, yellow, and leafy dark-green vegetables every day.  Choose whole-grain bread, cereal, and pasta. Good choices include whole wheat bread, whole wheat pasta, brown rice, and " oatmeal.  Get 4 or more servings of milk and milk products each day. Good choices include nonfat or low-fat milk, yogurt, and cheese. If you cannot eat milk products, you can get calcium from calcium-fortified products such as orange juice, soy milk, and tofu. Other non-milk sources of calcium include leafy green vegetables, such as broccoli, kale, mustard greens, turnip greens, bok kyleigh, and brussels sprouts.  If you eat meat, pick lower-fat types. Good choices include lean cuts of meat and chicken or turkey without the skin.  Avoid fish that are high in mercury. These include shark, swordfish, kristi mackerel, marlin, orange roughy, and bigeye tuna, as well as tilefish from the [facility].  It's okay to eat up to 8 to 12 ounces a week of fish that are low in mercury or up to 4 ounces a week of fish that have medium levels of mercury. Some fish that are low in mercury are salmon, shrimp, canned light tuna, cod, and tilapia. Some fish that have medium levels of mercury are halibut and white albacore tuna.  For more advice about eating fish, you can visit the U.S. Food and Drug Administration (FDA) or U.S. Environmental Protection Agency (EPA) website.  Heat lunch meats (such as turkey, ham, or bologna) to 165 F before you eat them. This reduces your risk of getting sick from a kind of bacteria that can be found in lunch meats.  Do not eat unpasteurized soft cheeses, such as brie, feta, fresh mozzarella, and blue cheese. They have a bacteria that could harm your baby.  Limit caffeine to about 200 to 300 mg per day. On average, a cup of brewed coffee has around 80 to 100 mg of caffeine.  Do not drink any alcohol. No amount of alcohol has been found to be safe during pregnancy.  Do not diet or try to lose weight. For example, do not follow a low-carbohydrate diet. If you are overweight at the start of your pregnancy, your doctor will work with you to manage your weight gain.  Tell your doctor about all vitamins and  "supplements you take.  When should you call for help?  Watch closely for changes in your health, and be sure to contact your doctor if you have any problems.  Where can you learn more?  Go to https://www.[employer].net/patiented  Enter Y785 in the search box to learn more about \"Nutrition During Pregnancy: Care Instructions.\"  Current as of: September 20, 2023               Content Version: 14.0    9944-9308 [employer].   Care instructions adapted under license by your healthcare professional. If you have questions about a medical condition or this instruction, always ask your healthcare professional. [employer] disclaims any warranty or liability for your use of this information.      Exercise During Pregnancy: Care Instructions  Overview     Exercise is good for you during a healthy pregnancy. It can relieve back pain, swelling, and other discomforts. It also prepares your muscles for childbirth. And exercise can improve your energy level and help you sleep better.  If your doctor advises it, get more exercise. For example, walking is a good choice. Bit by bit, increase the amount you walk every day. Try for at least 30 minutes on most days of the week. You could also try a prenatal exercise class. But if you do not already exercise, be sure to talk with your doctor before you start a new exercise program. Doctors do not recommend contact sports during pregnancy.  Follow-up care is a key part of your treatment and safety. Be sure to make and go to all appointments, and call your doctor if you are having problems. It's also a good idea to know your test results and keep a list of the medicines you take.  How can you care for yourself at home?  Talk with your doctor about the right kind of exercise for each stage of pregnancy.  Listen to your body to know if your exercise is at a safe level.  Do not become overheated while you exercise. High body temperature can be harmful. Avoid " "activities that can make your body too hot.  If you feel tired, take it easy. You might walk instead of run.  If you are used to strenuous exercise, ask your doctor how to know when it's time to slow down.  If you exercised before getting pregnant, you should be able to keep up your routine early in your pregnancy. Later in your pregnancy, you may want to switch to more gentle activities.  Drink plenty of fluids before, during, and after exercise.  Avoid contact sports, such as soccer and basketball. Also avoid risky activities. These include scuba diving, horseback riding, and exercising at a high altitude (above 6,000 feet). If you live in a place with a high altitude, talk to your doctor about how you can exercise safely.  Do not get overtired while you exercise. You should be able to talk while you work out.  After your fourth month of pregnancy, avoid exercises that require you to lie flat on your back on a hard surface. These include sit-ups and some yoga poses.  Get plenty of rest. You may be very tired while you are pregnant.  Where can you learn more?  Go to https://www.[employer].net/patiented  Enter S801 in the search box to learn more about \"Exercise During Pregnancy: Care Instructions.\"  Current as of: July 10, 2023               Content Version: 14.0    4433-5158 [employer].   Care instructions adapted under license by your healthcare professional. If you have questions about a medical condition or this instruction, always ask your healthcare professional. [employer] disclaims any warranty or liability for your use of this information.      Learning About Pregnancy and Obesity  How does your weight affect your pregnancy?     The basics of prenatal care are the same for everyone, regardless of size. You'll get what you need to have a healthy baby.  But your size can make a difference in a few things. You and your doctor will have to watch your pregnancy weight. Your weight " "may affect your labor and delivery.  You may have some extra doctor visits and tests. And you may have some tests earlier in your pregnancy. You'll need to pay close attention to things like blood pressure and the chance of getting gestational diabetes. (This is a type of diabetes that sometimes happens during pregnancy.) And close attention will be given to your developing baby.  Work with your doctor to get the care you need. Go to all your doctor visits, and follow your doctor's advice about what to do and what to avoid during pregnancy.  How much weight gain is healthy?  If you are very overweight (obese), experts recommend that you gain between 11 and 20 pounds. Your doctor will work with you to set a weight goal that's right for you. In some cases, your doctor may recommend that you not gain any weight.  How much extra food do you need to eat?  Although you may joke that you're \"eating for two\" during pregnancy, you don't need to eat twice as much food. How much you can eat depends on:  Your height.  How much you weigh when you get pregnant.  How active you are.  If you're carrying more than one fetus (multiple pregnancy).  In the first trimester, you'll probably need the same amount of calories as you did before you were pregnant. In general, in your second trimester, you need to eat about 340 extra calories a day. In your third trimester, you need to eat about 450 extra calories a day.  What can you do to have a healthy pregnancy?  The best things you can do for you and your baby are to eat healthy foods, get regular exercise, avoid alcohol and smoking, and go to your doctor visits.  Eat a variety of foods from all the food groups. Make sure to get enough calcium and folic acid.  You may want to work with a dietitian to help you plan healthy meals to get the right amount of calories for you.  If you didn't exercise much before you got pregnant, talk to your doctor about how you can slowly get more active. " "Your doctor may want to set up an exercise program with you.  Where can you learn more?  Go to https://www.[employer].net/patiented  Enter B644 in the search box to learn more about \"Learning About Pregnancy and Obesity.\"  Current as of: July 10, 2023               Content Version: 14.0    1334-1395 [employer].   Care instructions adapted under license by your healthcare professional. If you have questions about a medical condition or this instruction, always ask your healthcare professional. [employer] disclaims any warranty or liability for your use of this information.      You have been provided the CDC Warning Signs in Pregnancy document.    Additional copies can be found here: www.[employer].com/011313.pdf  "

## 2024-06-27 NOTE — PROGRESS NOTES
OB - New OB History and Physical    HPI: Phoebe Morris is a 23 year old  at 10w1d as dated by LMP c/w 10w US.   Estimated Date of Delivery: 2025.    Since becoming pregnant, patient reports she's been feeling fairly nauseated with some vomiting.  Denies any bleeding.    Had been working on losing weight prior to the pregnancy, so some stress about how things will go with weight during the pregnancy.      Ultrasound: 24  Early love IUP with yolk sac and cardiac activity, measures 10w 4d by today's ultrasound, EDC remains 25.     Obstetric history:     OB History    Para Term  AB Living   1 0 0 0 0 0   SAB IAB Ectopic Multiple Live Births   0 0 0 0 0      # Outcome Date GA Lbr Napoleon/2nd Weight Sex Type Anes PTL Lv   1 Current                Gynecologic History:   Menstrual Interval: regular, monthly days  Patient's last menstrual period was 2024.   STI history: neg  Last Pap: 2023  History of abnormal pap: neg    Allergy: Patient has no known allergies.  Patient denies food, latex or environmental allergies.     Current Medications:  Current Outpatient Medications   Medication Sig Dispense Refill    ondansetron (ZOFRAN) 4 MG tablet Take 1 tablet (4 mg) by mouth every 8 hours as needed for nausea 30 tablet 5    Prenatal Vit-DSS-Fe Cbn-FA (PRENATAL AD PO)       metoclopramide (REGLAN) 5 MG tablet Take 1 tablet (5 mg) by mouth 4 times daily as needed (nausea) 30 tablet 0     No current facility-administered medications for this visit.       Past Medical History:  Past Medical History:   Diagnosis Date    Obesity        Past Surgical History:  Past Surgical History:   Procedure Laterality Date    NO HISTORY OF SURGERY  2020       Social History:  Patient lives with .  Patient's relationship status is: .    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     Family History:  Family History   Problem Relation Age of Onset    No Known Problems Mother      Diabetes Father         Type 2    No Known Problems Maternal Grandmother     Diabetes Maternal Grandfather     Myocardial Infarction Paternal Grandmother         Heart attack    Hypertension Paternal Grandmother     No Known Problems Brother     No Known Problems Brother        Review of Systems  Gen:  no change in weight, no fever, no chills, no fatigue  CV: no palpitations, no chest pain, no hypertension, no syncope  Resp: no shortness of breath, no cough, no wheezing, no asthma  GI: + nausea, + vomiting, no diarrhea, no constipation, no bloating, no GERD  :  no vaginal discharge, no dysuria, no abnormal bleeding, no pelvic pain   Endo: no thyroid problems, no cold/heat intolerance, no acne, no hirsutism, no diabetes  Heme: no easy bruising or bleeding, no history of DVT/PE/CVA  Neuro: no headaches, no seizures, no strokes, no focal deficits      Physical Exam:  Vitals:    24 1453   BP: 116/76   Pulse: 89   Temp: 97.8  F (36.6  C)   TempSrc: Temporal   SpO2: 100%   Weight: 86.6 kg (191 lb)     Body mass index is 32.79 kg/m .  Gen: alert, oriented, no distress,  pleasant, appears stated age, appropriately groomed  Neck: supple, trachea midline, no thyromegaly, no lymphadenopathy  HEENT: head normocephalic, atraumatic, normal oropharynx without erythema or exudates  CV: normal heart sounds, regular rate and rhythm, no murmurs  Resp: good inspiratory effort, lungs clear to ascultation bilaterally, no wheezes or rhonchi  Extr: warm, well perfused, nontender, no edema  Psych: affect bright, cooperative, responds appropriately      Assessment:  Phoebe Morris is a 23 year old  at 10w1d presenting to establish prenatal care.    Problem List:   Nausea and vomiting:  Provided rx for Zofran.  Instructed to reach out if this doesn't adequately manage symptoms      Plan:  Reviewed routine prenatal care. Discussed MD call schedule as well as role of residents and med students both in clinic and hospital.   She is okay with resident care  Pap: UTD.  Due May 2026   Diet, Nutrition and Exercise:  Continue PNVs. Continue normal exercise. Her prepregnancy BMI is 32.  According to the WHO guidelines, patient is given a goal of gaining approximately 11-20 pounds during the course of her pregnancy.  We did discuss healthy weight gain, diet and exercise recommendations for pregnancy.  Immunizations: plan TdaP at 28 weeks  Fetal anomaly screening: discussed, declines  Routine Prenatal Care: the patient will return to clinic in 4 weeks and prn    Justine Huston MD

## 2024-06-28 ENCOUNTER — TRANSCRIBE ORDERS (OUTPATIENT)
Dept: MATERNAL FETAL MEDICINE | Facility: CLINIC | Age: 24
End: 2024-06-28
Payer: COMMERCIAL

## 2024-06-28 DIAGNOSIS — O26.90 PREGNANCY RELATED CONDITION, ANTEPARTUM: Primary | ICD-10-CM

## 2024-06-28 LAB — BACTERIA UR CULT: NORMAL

## 2024-07-03 ENCOUNTER — TELEPHONE (OUTPATIENT)
Dept: OBGYN | Facility: CLINIC | Age: 24
End: 2024-07-03
Payer: COMMERCIAL

## 2024-07-03 ENCOUNTER — HOSPITAL ENCOUNTER (EMERGENCY)
Facility: CLINIC | Age: 24
Discharge: HOME | End: 2024-07-03
Attending: EMERGENCY MEDICINE | Admitting: EMERGENCY MEDICINE
Payer: COMMERCIAL

## 2024-07-03 VITALS
BODY MASS INDEX: 32.44 KG/M2 | TEMPERATURE: 98.5 F | HEIGHT: 64 IN | HEART RATE: 114 BPM | WEIGHT: 190 LBS | RESPIRATION RATE: 18 BRPM | SYSTOLIC BLOOD PRESSURE: 130 MMHG | DIASTOLIC BLOOD PRESSURE: 84 MMHG | OXYGEN SATURATION: 98 %

## 2024-07-03 DIAGNOSIS — O21.9 NAUSEA AND VOMITING IN PREGNANCY: ICD-10-CM

## 2024-07-03 LAB
ALBUMIN UR-MCNC: NEGATIVE MG/DL
ANION GAP SERPL CALCULATED.3IONS-SCNC: 10 MMOL/L (ref 7–15)
APPEARANCE UR: CLEAR
BACTERIA #/AREA URNS HPF: ABNORMAL /HPF
BILIRUB UR QL STRIP: NEGATIVE
BUN SERPL-MCNC: 5.6 MG/DL (ref 6–20)
CALCIUM SERPL-MCNC: 8.3 MG/DL (ref 8.6–10)
CHLORIDE SERPL-SCNC: 102 MMOL/L (ref 98–107)
COLOR UR AUTO: ABNORMAL
CREAT SERPL-MCNC: 0.55 MG/DL (ref 0.51–0.95)
DEPRECATED HCO3 PLAS-SCNC: 24 MMOL/L (ref 22–29)
EGFRCR SERPLBLD CKD-EPI 2021: >90 ML/MIN/1.73M2
GLUCOSE SERPL-MCNC: 103 MG/DL (ref 70–99)
GLUCOSE UR STRIP-MCNC: NEGATIVE MG/DL
HGB UR QL STRIP: NEGATIVE
KETONES UR STRIP-MCNC: NEGATIVE MG/DL
LEUKOCYTE ESTERASE UR QL STRIP: NEGATIVE
MUCOUS THREADS #/AREA URNS LPF: PRESENT /LPF
NITRATE UR QL: NEGATIVE
PH UR STRIP: 6.5 [PH] (ref 5–7)
POTASSIUM SERPL-SCNC: 3.6 MMOL/L (ref 3.4–5.3)
RBC URINE: <1 /HPF
SODIUM SERPL-SCNC: 136 MMOL/L (ref 135–145)
SP GR UR STRIP: 1.01 (ref 1–1.03)
SQUAMOUS EPITHELIAL: 1 /HPF
TRANSITIONAL EPI: <1 /HPF
UROBILINOGEN UR STRIP-MCNC: NORMAL MG/DL
WBC URINE: 1 /HPF

## 2024-07-03 PROCEDURE — 36415 COLL VENOUS BLD VENIPUNCTURE: CPT

## 2024-07-03 PROCEDURE — 99284 EMERGENCY DEPT VISIT MOD MDM: CPT | Performed by: EMERGENCY MEDICINE

## 2024-07-03 PROCEDURE — 99284 EMERGENCY DEPT VISIT MOD MDM: CPT | Mod: 25 | Performed by: EMERGENCY MEDICINE

## 2024-07-03 PROCEDURE — 250N000011 HC RX IP 250 OP 636

## 2024-07-03 PROCEDURE — 96375 TX/PRO/DX INJ NEW DRUG ADDON: CPT | Performed by: EMERGENCY MEDICINE

## 2024-07-03 PROCEDURE — 96361 HYDRATE IV INFUSION ADD-ON: CPT | Performed by: EMERGENCY MEDICINE

## 2024-07-03 PROCEDURE — 81003 URINALYSIS AUTO W/O SCOPE: CPT

## 2024-07-03 PROCEDURE — 250N000011 HC RX IP 250 OP 636: Performed by: EMERGENCY MEDICINE

## 2024-07-03 PROCEDURE — 96374 THER/PROPH/DIAG INJ IV PUSH: CPT | Performed by: EMERGENCY MEDICINE

## 2024-07-03 PROCEDURE — 80048 BASIC METABOLIC PNL TOTAL CA: CPT

## 2024-07-03 PROCEDURE — 258N000003 HC RX IP 258 OP 636

## 2024-07-03 RX ORDER — ONDANSETRON 2 MG/ML
4 INJECTION INTRAMUSCULAR; INTRAVENOUS ONCE
Status: COMPLETED | OUTPATIENT
Start: 2024-07-03 | End: 2024-07-03

## 2024-07-03 RX ORDER — METOCLOPRAMIDE HYDROCHLORIDE 5 MG/ML
5 INJECTION INTRAMUSCULAR; INTRAVENOUS ONCE
Status: COMPLETED | OUTPATIENT
Start: 2024-07-03 | End: 2024-07-03

## 2024-07-03 RX ORDER — METOCLOPRAMIDE 5 MG/1
5 TABLET ORAL 4 TIMES DAILY PRN
Qty: 30 TABLET | Refills: 0 | Status: SHIPPED | OUTPATIENT
Start: 2024-07-03

## 2024-07-03 RX ADMIN — METOCLOPRAMIDE HYDROCHLORIDE 5 MG: 5 INJECTION INTRAMUSCULAR; INTRAVENOUS at 18:22

## 2024-07-03 RX ADMIN — SODIUM CHLORIDE 1000 ML: 9 INJECTION, SOLUTION INTRAVENOUS at 16:31

## 2024-07-03 RX ADMIN — ONDANSETRON 4 MG: 2 INJECTION INTRAMUSCULAR; INTRAVENOUS at 16:32

## 2024-07-03 ASSESSMENT — COLUMBIA-SUICIDE SEVERITY RATING SCALE - C-SSRS
6. HAVE YOU EVER DONE ANYTHING, STARTED TO DO ANYTHING, OR PREPARED TO DO ANYTHING TO END YOUR LIFE?: NO
1. IN THE PAST MONTH, HAVE YOU WISHED YOU WERE DEAD OR WISHED YOU COULD GO TO SLEEP AND NOT WAKE UP?: NO
2. HAVE YOU ACTUALLY HAD ANY THOUGHTS OF KILLING YOURSELF IN THE PAST MONTH?: NO

## 2024-07-03 ASSESSMENT — ACTIVITIES OF DAILY LIVING (ADL)
ADLS_ACUITY_SCORE: 35

## 2024-07-03 NOTE — DISCHARGE INSTRUCTIONS
Take metoclopamide as needed for nausea and vomiting.  Drink plenty of fluids.  Eat frequent, small meals.    Follow-up with OB/GYN as planned.    Return to the emergency department if fever, abdominal pain, worse, or other concerns.

## 2024-07-03 NOTE — ED TRIAGE NOTES
Patient presents due to nausea and vomiting for the past 24 hours. Patient is unable to keep anything down. Patient is currently 11 weeks pregnant.      Triage Assessment (Adult)       Row Name 07/03/24 1544          Triage Assessment    Airway WDL WDL        Respiratory WDL    Respiratory WDL WDL        Skin Circulation/Temperature WDL    Skin Circulation/Temperature WDL WDL        Cardiac WDL    Cardiac WDL WDL        Peripheral/Neurovascular WDL    Peripheral Neurovascular WDL WDL        Cognitive/Neuro/Behavioral WDL    Cognitive/Neuro/Behavioral WDL WDL

## 2024-07-03 NOTE — ED PROVIDER NOTES
ED Provider Note  [facility]      History     Chief Complaint   Patient presents with    Nausea & Vomiting     Patient presents due to nausea and vomiting for the past 24 hours. Patient is unable to keep anything down. Patient is currently 11 weeks pregnant.      ESME Morris is a , 23 year old female, currently 11 weeks pregnant but otherwise healthy, who presents here today for nausea and vomiting that has been going on for last 24 hours.  She called the OB nurse line today and they suggested that she come in for evaluation.    Patient has had some intermittent nausea since about week 7 of pregnancy.  She had been using Unisom and B6 nightly which did help.  However on Monday, she was given a prescription for Zofran 4 mg to use, as the other medications were not helping enough anymore. She tried this on Monday and felt it helped some.  She then took it again today around 2 PM, but did not get much relief.    She feels quite dehydrated, she has not been able to keep even liquids down.  She has vomited 7-8 times today.  She notes that laying down does help.  She was able to work from home today and bed.     She denies any abdominal pain, chest pain, shortness of breath, fever, chills, other URI symptoms.  She notes that the pregnancy so far has gone well.  She had a normal ultrasound recently.  This is her first pregnancy. She denies any vaginal bleeding or abnormal discharge or any abdominal pain or cramping.No one else is sick at home.  No diarrhea.    Past Medical History  Past Medical History:   Diagnosis Date    Obesity      Past Surgical History:   Procedure Laterality Date    NO HISTORY OF SURGERY  2020     metoclopramide (REGLAN) 5 MG tablet  ondansetron (ZOFRAN) 4 MG tablet  Prenatal Vit-DSS-Fe Cbn-FA (PRENATAL AD PO)      No Known Allergies  Family History  Family History   Problem Relation Age of Onset    No Known Problems Mother     Diabetes Father          "Type 2    No Known Problems Maternal Grandmother     Diabetes Maternal Grandfather     Myocardial Infarction Paternal Grandmother         Heart attack    Hypertension Paternal Grandmother     No Known Problems Brother     No Known Problems Brother      Social History   Social History     Tobacco Use    Smoking status: Never    Smokeless tobacco: Never   Vaping Use    Vaping status: Never Used   Substance Use Topics    Alcohol use: Not Currently     Comment: 1-2x every couple weeks - on social occasions    Drug use: No      A complete review of systems was performed with pertinent positives and negatives noted in the HPI, and all other systems negative.    Physical Exam   BP: 130/84  Pulse: 114  Temp: 98.5  F (36.9  C)  Resp: 18  Height: 162.6 cm (5' 4\")  Weight: 86.2 kg (190 lb)  SpO2: 98 %  Physical Exam  Constitutional:       General: She is not in acute distress.     Appearance: She is not ill-appearing.   HENT:      Head: Normocephalic and atraumatic.   Cardiovascular:      Rate and Rhythm: Regular rhythm. Tachycardia present.   Pulmonary:      Effort: Pulmonary effort is normal.      Breath sounds: Normal breath sounds.   Abdominal:      Palpations: Abdomen is soft.      Tenderness: There is no abdominal tenderness. There is no guarding or rebound.   Skin:     General: Skin is warm and dry.   Neurological:      Mental Status: She is alert and oriented to person, place, and time.   Psychiatric:         Behavior: Behavior normal.           ED Course, Procedures, & Data      Procedures          Results for orders placed or performed during the hospital encounter of 07/03/24   Basic metabolic panel     Status: Abnormal   Result Value Ref Range    Sodium 136 135 - 145 mmol/L    Potassium 3.6 3.4 - 5.3 mmol/L    Chloride 102 98 - 107 mmol/L    Carbon Dioxide (CO2) 24 22 - 29 mmol/L    Anion Gap 10 7 - 15 mmol/L    Urea Nitrogen 5.6 (L) 6.0 - 20.0 mg/dL    Creatinine 0.55 0.51 - 0.95 mg/dL    GFR Estimate >90 >60 " mL/min/1.73m2    Calcium 8.3 (L) 8.6 - 10.0 mg/dL    Glucose 103 (H) 70 - 99 mg/dL   UA with Microscopic reflex to Culture     Status: Abnormal    Specimen: Urine, Midstream   Result Value Ref Range    Color Urine Light Yellow Colorless, Straw, Light Yellow, Yellow    Appearance Urine Clear Clear    Glucose Urine Negative Negative mg/dL    Bilirubin Urine Negative Negative    Ketones Urine Negative Negative mg/dL    Specific Gravity Urine 1.010 1.003 - 1.035    Blood Urine Negative Negative    pH Urine 6.5 5.0 - 7.0    Protein Albumin Urine Negative Negative mg/dL    Urobilinogen Urine Normal Normal, 2.0 mg/dL    Nitrite Urine Negative Negative    Leukocyte Esterase Urine Negative Negative    Bacteria Urine Few (A) None Seen /HPF    Mucus Urine Present (A) None Seen /LPF    RBC Urine <1 <=2 /HPF    WBC Urine 1 <=5 /HPF    Squamous Epithelials Urine 1 <=1 /HPF    Transitional Epithelials Urine <1 <=1 /HPF    Narrative    Urine Culture not indicated     Medications   sodium chloride 0.9% BOLUS 1,000 mL (0 mLs Intravenous Stopped 7/3/24 1749)   ondansetron (ZOFRAN) injection 4 mg (4 mg Intravenous $Given 7/3/24 1632)   metoclopramide (REGLAN) injection 5 mg (5 mg Intravenous $Given 7/3/24 1822)     Labs Ordered and Resulted from Time of ED Arrival to Time of ED Departure   BASIC METABOLIC PANEL - Abnormal       Result Value    Sodium 136      Potassium 3.6      Chloride 102      Carbon Dioxide (CO2) 24      Anion Gap 10      Urea Nitrogen 5.6 (*)     Creatinine 0.55      GFR Estimate >90      Calcium 8.3 (*)     Glucose 103 (*)    ROUTINE UA WITH MICROSCOPIC REFLEX TO CULTURE - Abnormal    Color Urine Light Yellow      Appearance Urine Clear      Glucose Urine Negative      Bilirubin Urine Negative      Ketones Urine Negative      Specific Gravity Urine 1.010      Blood Urine Negative      pH Urine 6.5      Protein Albumin Urine Negative      Urobilinogen Urine Normal      Nitrite Urine Negative      Leukocyte Esterase  Urine Negative      Bacteria Urine Few (*)     Mucus Urine Present (*)     RBC Urine <1      WBC Urine 1      Squamous Epithelials Urine 1      Transitional Epithelials Urine <1       No orders to display          Critical care was not performed.     Assessment & Plan    Phoebe Morris is a , 23 year old female, currently 11 weeks pregnant, who presents here today for nausea and vomiting that has been going on for last 24 hours.      Patient has not had any abdominal pain or other symptoms.  No fever or chills.  She does arrive tachycardic.  Abdominal exam is benign.   Did order a BMP, given that the nausea and vomiting has been going on for over 24 hours now.  This is unremarkable. Urinalysis is also unremarkable.Do not feel that further workup for intra-abdominal pathology or other causes nausea or vomiting need to be pursued at this time. Likely this is related to nausea and vomiting of pregnancy    Patient was given 1 L normal saline and 4 mg Zofran IV to start.  This helped some, but she was unable to keep significant fluids down. She was then given 5 mg Reglan IV, which helped significantly.  She was able to keep half a glass of water down and get up and go to the bathroom without feeling nauseated.  She overall feels quite a bit improved and is comfortable going home.    We will send her with a prescription for Reglan to take at home.    --    ED Attending Physician Attestation    I JULIO YEPEZ MD, MD, cared for this patient with the Resident. I have performed a history and physical examination of the patient and discussed management with the resident. I reviewed the resident's documentation above and agree with the documented findings and plan of care.    Summary of HPI, PE, ED Course   Patient is a 23 year old female evaluated in the emergency department for nausea and vomiting in pregnancy.  No abdominal pain or vaginal bleeding. Exam and ED course notable for normal vital signs, well appearance,  abdomen soft and nontender.  Basic metabolic panel normal.  Urinalysis negative for infection.  Markedly improved after IV Reglan. After the completion of care in the emergency department, the patient was discharged.      BILL YEPEZ MD, MD  Emergency Medicine      I have reviewed the nursing notes. I have reviewed the findings, diagnosis, plan and need for follow up with the patient.    Discharge Medication List as of 7/3/2024  7:03 PM        START taking these medications    Details   metoclopramide (REGLAN) 5 MG tablet Take 1 tablet (5 mg) by mouth 4 times daily as needed (nausea), Disp-30 tablet, R-0, E-Prescribe             Final diagnoses:   Nausea and vomiting in pregnancy     Chart documentation was completed with Dragon voice-recognition software. Even though reviewed, this chart may still contain some grammatical, spelling, and word errors.     Ashley Quiroga MD  Resident physician  07/03/24       Ashley Quiroga MD  Resident  07/03/24 6907       Bill Yepez MD  07/04/24 0115

## 2024-07-03 NOTE — TELEPHONE ENCOUNTER
11w0d     Nausea has been up and down since week 6  Has been vomiting but not to this extend. Anything she eats comes back up within 10 minutes to 1 hour.  Is able to choke down some water but its not going well.    Feels tired, not a new symptoms.   Is lying down and feels okay, no dizziness or light headedness.     Took zofran x1 on Monday and that allowed her to feel better and sleep through the night.    RN recommending she take zofran and try to eat or drink about 1 hour after she takes it, if she is unable to keep fluids down for 24 hours we recommend she proceed to the ED.  Phoebe is in agreement with this plan,    NEETU Singh

## 2024-07-03 NOTE — TELEPHONE ENCOUNTER
Caller reporting the following red-flag symptom(s): pt is 11w0d, she stated she has not been able to keep anything down for 24 hours, and has vomited 7-8 times today, pt is wondering if she needs IV fluids or should go to UC    Per the system red-flag symptom policy, patient was instructed to:  speak with a Registered Nurse    Action:  Patient warm transferred to a Registered Nurse

## 2024-07-07 ENCOUNTER — HEALTH MAINTENANCE LETTER (OUTPATIENT)
Age: 24
End: 2024-07-07

## 2024-08-01 ENCOUNTER — PRENATAL OFFICE VISIT (OUTPATIENT)
Dept: OBGYN | Facility: CLINIC | Age: 24
End: 2024-08-01
Payer: COMMERCIAL

## 2024-08-01 VITALS
BODY MASS INDEX: 33.92 KG/M2 | WEIGHT: 197.6 LBS | DIASTOLIC BLOOD PRESSURE: 82 MMHG | HEART RATE: 98 BPM | OXYGEN SATURATION: 100 % | SYSTOLIC BLOOD PRESSURE: 121 MMHG

## 2024-08-01 DIAGNOSIS — Z34.02 ENCOUNTER FOR SUPERVISION OF NORMAL FIRST PREGNANCY, SECOND TRIMESTER: Primary | ICD-10-CM

## 2024-08-01 PROCEDURE — 99207 PR PRENATAL VISIT: CPT

## 2024-08-01 NOTE — PROGRESS NOTES
15.1 wks here for henry  Not feeling fm yet  No ctx, bleeding, lof  Nausea finally improving had an ed visit 07/3- was feeling really rough  Has zofran and reglan discussed PRN use  Discussed food mods  FAS 8/30  Gct/hgb/rpr at 24 wks  Rtc 4 wks  RN triage for any concerns  BERNICE Pepper CNP

## 2024-08-09 ENCOUNTER — OFFICE VISIT (OUTPATIENT)
Dept: PEDIATRICS | Facility: CLINIC | Age: 24
End: 2024-08-09
Payer: COMMERCIAL

## 2024-08-09 ENCOUNTER — TELEPHONE (OUTPATIENT)
Dept: OBGYN | Facility: CLINIC | Age: 24
End: 2024-08-09

## 2024-08-09 VITALS
DIASTOLIC BLOOD PRESSURE: 69 MMHG | WEIGHT: 197.2 LBS | SYSTOLIC BLOOD PRESSURE: 104 MMHG | BODY MASS INDEX: 33.85 KG/M2 | HEART RATE: 88 BPM | RESPIRATION RATE: 18 BRPM | OXYGEN SATURATION: 100 % | TEMPERATURE: 98 F

## 2024-08-09 DIAGNOSIS — O21.0 HYPEREMESIS GRAVIDARUM: Primary | ICD-10-CM

## 2024-08-09 DIAGNOSIS — O21.9 NAUSEA/VOMITING IN PREGNANCY: Primary | ICD-10-CM

## 2024-08-09 LAB
ALBUMIN SERPL-MCNC: 3.5 G/DL (ref 3.4–5)
ALBUMIN UR-MCNC: NEGATIVE MG/DL
ALP SERPL-CCNC: 60 U/L (ref 40–150)
ALT SERPL W P-5'-P-CCNC: 21 U/L (ref 0–50)
ANION GAP SERPL CALCULATED.3IONS-SCNC: 6 MMOL/L (ref 3–14)
APPEARANCE UR: CLEAR
AST SERPL W P-5'-P-CCNC: 22 U/L (ref 0–45)
BILIRUB SERPL-MCNC: 0.7 MG/DL (ref 0.2–1.3)
BILIRUB UR QL STRIP: NEGATIVE
BUN SERPL-MCNC: 6 MG/DL (ref 7–30)
CALCIUM SERPL-MCNC: 8.9 MG/DL (ref 8.5–10.1)
CHLORIDE BLD-SCNC: 108 MMOL/L (ref 94–109)
CO2 SERPL-SCNC: 24 MMOL/L (ref 20–32)
COLOR UR AUTO: YELLOW
CREAT SERPL-MCNC: 0.6 MG/DL (ref 0.52–1.04)
EGFRCR SERPLBLD CKD-EPI 2021: >90 ML/MIN/1.73M2
ERYTHROCYTE [DISTWIDTH] IN BLOOD BY AUTOMATED COUNT: 12.5 % (ref 10–15)
GLUCOSE BLD-MCNC: 79 MG/DL (ref 70–99)
GLUCOSE UR STRIP-MCNC: NEGATIVE MG/DL
HCT VFR BLD AUTO: 40.3 % (ref 35–47)
HGB BLD-MCNC: 14 G/DL (ref 11.7–15.7)
HGB UR QL STRIP: NEGATIVE
KETONES UR STRIP-MCNC: NEGATIVE MG/DL
LEUKOCYTE ESTERASE UR QL STRIP: NEGATIVE
MCH RBC QN AUTO: 30.6 PG (ref 26.5–33)
MCHC RBC AUTO-ENTMCNC: 34.7 G/DL (ref 31.5–36.5)
MCV RBC AUTO: 88 FL (ref 78–100)
NITRATE UR QL: NEGATIVE
PH UR STRIP: 8 [PH] (ref 5–8)
PLATELET # BLD AUTO: 288 10E3/UL (ref 150–450)
POTASSIUM BLD-SCNC: 4.2 MMOL/L (ref 3.4–5.3)
PROT SERPL-MCNC: 6.7 G/DL (ref 6.8–8.8)
RBC # BLD AUTO: 4.57 10E6/UL (ref 3.8–5.2)
SODIUM SERPL-SCNC: 138 MMOL/L (ref 135–145)
SP GR UR STRIP: 1.02 (ref 1–1.03)
UROBILINOGEN UR STRIP-ACNC: 0.2 E.U./DL
WBC # BLD AUTO: 13.3 10E3/UL (ref 4–11)

## 2024-08-09 PROCEDURE — 81003 URINALYSIS AUTO W/O SCOPE: CPT | Performed by: EMERGENCY MEDICINE

## 2024-08-09 PROCEDURE — 80053 COMPREHEN METABOLIC PANEL: CPT | Performed by: EMERGENCY MEDICINE

## 2024-08-09 PROCEDURE — 96374 THER/PROPH/DIAG INJ IV PUSH: CPT | Performed by: EMERGENCY MEDICINE

## 2024-08-09 PROCEDURE — 85027 COMPLETE CBC AUTOMATED: CPT | Performed by: EMERGENCY MEDICINE

## 2024-08-09 PROCEDURE — 99214 OFFICE O/P EST MOD 30 MIN: CPT | Mod: 25 | Performed by: EMERGENCY MEDICINE

## 2024-08-09 PROCEDURE — 36415 COLL VENOUS BLD VENIPUNCTURE: CPT | Performed by: EMERGENCY MEDICINE

## 2024-08-09 PROCEDURE — 96361 HYDRATE IV INFUSION ADD-ON: CPT | Performed by: EMERGENCY MEDICINE

## 2024-08-09 RX ORDER — METOCLOPRAMIDE HYDROCHLORIDE 5 MG/ML
10 INJECTION INTRAMUSCULAR; INTRAVENOUS EVERY 6 HOURS
Status: CANCELLED
Start: 2024-08-09

## 2024-08-09 RX ORDER — HEPARIN SODIUM (PORCINE) LOCK FLUSH IV SOLN 100 UNIT/ML 100 UNIT/ML
5 SOLUTION INTRAVENOUS
Status: CANCELLED | OUTPATIENT
Start: 2024-08-09

## 2024-08-09 RX ORDER — ONDANSETRON 2 MG/ML
4 INJECTION INTRAMUSCULAR; INTRAVENOUS ONCE
Status: CANCELLED | OUTPATIENT
Start: 2024-08-09 | End: 2024-08-09

## 2024-08-09 RX ORDER — ONDANSETRON 2 MG/ML
4 INJECTION INTRAMUSCULAR; INTRAVENOUS ONCE
Status: COMPLETED | OUTPATIENT
Start: 2024-08-09 | End: 2024-08-09

## 2024-08-09 RX ORDER — HEPARIN SODIUM,PORCINE 10 UNIT/ML
5-20 VIAL (ML) INTRAVENOUS DAILY PRN
Status: CANCELLED | OUTPATIENT
Start: 2024-08-09

## 2024-08-09 RX ADMIN — Medication 1000 ML: at 11:30

## 2024-08-09 RX ADMIN — ONDANSETRON 4 MG: 2 INJECTION INTRAMUSCULAR; INTRAVENOUS at 11:29

## 2024-08-09 RX ADMIN — Medication 1000 ML: at 12:33

## 2024-08-09 ASSESSMENT — PAIN SCALES - GENERAL: PAINLEVEL: NO PAIN (0)

## 2024-08-09 NOTE — TELEPHONE ENCOUNTER
Verified with Sydnie bobby to do weekly IV infusion order for 1LR, 4mg zofran and 10mg reglan (if patient hasn't taken oral doses within 6 hour window).    Called pt to let her know plan ordered. RN called [facility] in [place] who reached out to the pt and have her scheduled for IVF/antiemetics at their clinic at 11am today.    Elsi De Jesus RN on 8/9/2024 at 10:32 AM

## 2024-08-09 NOTE — PROGRESS NOTES
Acute and Diagnostic Services Clinic Visit    {PROVIDER CHARTING PREFERENCE:844919}    Timothy Sandhu is a 23 year old, presenting for the following health issues:  Hyperemesis  {(!) Visit Details have not yet been documented.  Please enter Visit Details and then use this list to pull in documentation. (Optional):192411}  HPI     {ADS SUPERLIST :547871}      {ROS Picklists (Optional):561744}      Objective    /69 (BP Location: Right arm, Patient Position: Sitting, Cuff Size: Adult Large)   Pulse 88   Temp 98  F (36.7  C) (Oral)   Resp 18   Wt 89.4 kg (197 lb 3.2 oz)   LMP 04/17/2024   SpO2 100%   BMI 33.85 kg/m    Body mass index is 33.85 kg/m .  Physical Exam   {Exam List (Optional):522299}    {Diagnostic Test Results (Optional):691871}        Signed Electronically by: THERESA RAMOS MD  {Email feedback regarding this note to [email or web address]   :[id number]}

## 2024-08-09 NOTE — PROGRESS NOTES
Impression:  Nausea and vomiting patient is 16 weeks pregnant.  She had hyperemesis early in her pregnancy but that resolved but today she developed recurrent vomiting.  There is no abdominal tenderness.  No blood in the emesis.  No vaginal bleeding or discharge.  Her urinalysis is clear.  Her white count is slightly elevated.  Her comprehensive metabolic profile is unremarkable.    Plan:  Patient was given IV fluids and Zofran.  She was able to take clear liquids.  She has Zofran at home to take if she develops recurrent nausea.  She should follow-up with her OB doctor on Monday to see how she is doing and if she needs to have her white blood count rechecked.  If she has any new or worsening problems in the meantime she should seek care immediately      Chief Complaint:  Patient presents with:  Hyperemesis         HPI:   Phoebe Morris is a 23 year old female who presents to this clinic for the evaluation of vomiting.  Patient is 16 weeks pregnant without any complications.  She had a lot of vomiting from weeks 6 through 11 and was treated with Zofran and Reglan and then got better.  However this morning she woke up and had recurrent vomiting and was unable to keep anything down.  She complains of some mild aching on both sides of her abdomen but no severe pain.  No hematemesis.  No diarrhea.  No dysuria or hematuria.  No vaginal bleeding or discharge.      PMH:   Past Medical History:   Diagnosis Date    Obesity      Past Surgical History:   Procedure Laterality Date    NO HISTORY OF SURGERY  01/08/2020         ROS:  All other systems negative    Meds:    Current Outpatient Medications:     metoclopramide (REGLAN) 5 MG tablet, Take 1 tablet (5 mg) by mouth 4 times daily as needed (nausea), Disp: 30 tablet, Rfl: 0    ondansetron (ZOFRAN) 4 MG tablet, Take 1 tablet (4 mg) by mouth every 8 hours as needed for nausea, Disp: 30 tablet, Rfl: 5    Prenatal Vit-DSS-Fe Cbn-FA (PRENATAL AD PO), , Disp: , Rfl:     Current  Facility-Administered Medications:     sodium chloride 0.9% BOLUS 1,000 mL, 1,000 mL, Intravenous, Once, , Last Rate: 1,000 mL/hr at 08/09/24 1130, 1,000 mL at 08/09/24 1130        Social:  Social History     Socioeconomic History    Marital status: Single     Spouse name: Not on file    Number of children: Not on file    Years of education: Not on file    Highest education level: Not on file   Occupational History    Not on file   Tobacco Use    Smoking status: Never    Smokeless tobacco: Never   Vaping Use    Vaping status: Never Used   Substance and Sexual Activity    Alcohol use: Not Currently     Comment: 1-2x every couple weeks - on social occasions    Drug use: No    Sexual activity: Yes     Partners: Male   Other Topics Concern    Parent/sibling w/ CABG, MI or angioplasty before 65F 55M? No   Social History Narrative    Not on file     Social Determinants of Health     Financial Resource Strain: Not on file   Food Insecurity: Not on file   Transportation Needs: Not on file   Physical Activity: Not on file   Stress: Not on file   Social Connections: Not on file   Interpersonal Safety: Not on file   Housing Stability: Not on file         Physical Exam:  Vitals:    08/09/24 1101   BP: 104/69   BP Location: Right arm   Patient Position: Sitting   Cuff Size: Adult Large   Pulse: 88   Resp: 18   Temp: 98  F (36.7  C)   TempSrc: Oral   SpO2: 100%   Weight: 89.4 kg (197 lb 3.2 oz)      Patient is awake, alert, no distress  Abdomen: Nontender without mass  Extremities: No tenderness or deformity  Skin: No lesions or rash  Neuro: Normal motor and sensory function in all extremities  Psych: Awake, alert, normally responsive  Fetal heart rate 150      Results:    Results for orders placed or performed in visit on 08/09/24 (from the past 24 hour(s))   CBC with platelets   Result Value Ref Range    WBC Count 13.3 (H) 4.0 - 11.0 10e3/uL    RBC Count 4.57 3.80 - 5.20 10e6/uL    Hemoglobin 14.0 11.7 - 15.7 g/dL    Hematocrit  40.3 35.0 - 47.0 %    MCV 88 78 - 100 fL    MCH 30.6 26.5 - 33.0 pg    MCHC 34.7 31.5 - 36.5 g/dL    RDW 12.5 10.0 - 15.0 %    Platelet Count 288 150 - 450 10e3/uL   Comprehensive metabolic panel   Result Value Ref Range    Sodium 138 135 - 145 mmol/L    Potassium 4.2 3.4 - 5.3 mmol/L    Chloride 108 94 - 109 mmol/L    Carbon Dioxide (CO2) 24 20 - 32 mmol/L    Anion Gap 6 3 - 14 mmol/L    Urea Nitrogen 6 (L) 7 - 30 mg/dL    Creatinine 0.60 0.52 - 1.04 mg/dL    GFR Estimate >90 >60 mL/min/1.73m2    Calcium 8.9 8.5 - 10.1 mg/dL    Glucose 79 70 - 99 mg/dL    Alkaline Phosphatase 60 40 - 150 U/L    AST 22 0 - 45 U/L    ALT 21 0 - 50 U/L    Protein Total 6.7 (L) 6.8 - 8.8 g/dL    Albumin 3.5 3.4 - 5.0 g/dL    Bilirubin Total 0.7 0.2 - 1.3 mg/dL   UA Macroscopic with reflex to Microscopic and Culture    Specimen: Urine, Clean Catch   Result Value Ref Range    Color Urine Yellow Colorless, Straw, Light Yellow, Yellow    Appearance Urine Clear Clear    Glucose Urine Negative Negative mg/dL    Bilirubin Urine Negative Negative    Ketones Urine Negative Negative mg/dL    Specific Gravity Urine 1.020 1.005 - 1.030    Blood Urine Negative Negative    pH Urine 8.0 5.0 - 8.0    Protein Albumin Urine Negative Negative mg/dL    Urobilinogen Urine 0.2 0.2, 1.0 E.U./dL    Nitrite Urine Negative Negative    Leukocyte Esterase Urine Negative Negative    Narrative    Microscopic not indicated        No results found for this or any previous visit (from the past 24 hour(s)).       Marcel Magaña MD

## 2024-08-09 NOTE — TELEPHONE ENCOUNTER
Caller reporting the following red-flag symptom(s): Pt is experiencing a lot of nausea, unable to keep anything down, and a lot of dizziness    Per the system red-flag symptom policy, patient was instructed to:  speak with a Registered Nurse    Action:  Patient warm transferred to a Registered Nurse

## 2024-08-09 NOTE — TELEPHONE ENCOUNTER
16w2d    Hasn't been sick for about 2-3 weeks but woke up today with severe symptoms  Severe nausea/vomiting; dizziness/lightheadedness  Thrown up 3 times today, blood sugar is 85 so no concerns with low blood sugars  Had salad for lunch yesterday but always has that and no concerns about food poisoning  Denies any darker urine, small amount 'stretching' feeling but not cramping/pain    Pt hasn't taken zofran or reglan yet since this is new/was told to contact clinic to avoid ED if possible if happens again  Want to do IVF and I can try to get her into one of our [facility] clinics?    1LR PRN 3x weekly' IV zofran/reglan?  Concerns about this being an acute issues?  Routing to provider to advise.  Elsi De Jesus RN on 2024 at 10:12 AM

## 2024-08-28 ENCOUNTER — PRE VISIT (OUTPATIENT)
Dept: MATERNAL FETAL MEDICINE | Facility: CLINIC | Age: 24
End: 2024-08-28
Payer: COMMERCIAL

## 2024-08-30 ENCOUNTER — PRENATAL OFFICE VISIT (OUTPATIENT)
Dept: OBGYN | Facility: CLINIC | Age: 24
End: 2024-08-30
Attending: OBSTETRICS & GYNECOLOGY
Payer: COMMERCIAL

## 2024-08-30 ENCOUNTER — OFFICE VISIT (OUTPATIENT)
Dept: MATERNAL FETAL MEDICINE | Facility: CLINIC | Age: 24
End: 2024-08-30
Attending: OBSTETRICS & GYNECOLOGY
Payer: COMMERCIAL

## 2024-08-30 ENCOUNTER — HOSPITAL ENCOUNTER (OUTPATIENT)
Dept: ULTRASOUND IMAGING | Facility: CLINIC | Age: 24
Discharge: HOME | End: 2024-08-30
Attending: OBSTETRICS & GYNECOLOGY
Payer: COMMERCIAL

## 2024-08-30 VITALS
BODY MASS INDEX: 34.83 KG/M2 | DIASTOLIC BLOOD PRESSURE: 84 MMHG | HEART RATE: 103 BPM | SYSTOLIC BLOOD PRESSURE: 129 MMHG | WEIGHT: 204 LBS | TEMPERATURE: 98.1 F | HEIGHT: 64 IN

## 2024-08-30 DIAGNOSIS — D72.10 EOSINOPHILIA, UNSPECIFIED TYPE: ICD-10-CM

## 2024-08-30 DIAGNOSIS — O26.90 PREGNANCY RELATED CONDITION, ANTEPARTUM: ICD-10-CM

## 2024-08-30 DIAGNOSIS — M54.50 LOW BACK PAIN DURING PREGNANCY IN SECOND TRIMESTER: ICD-10-CM

## 2024-08-30 DIAGNOSIS — O99.210 OBESITY IN PREGNANCY, ANTEPARTUM: Primary | ICD-10-CM

## 2024-08-30 DIAGNOSIS — O26.892 LOW BACK PAIN DURING PREGNANCY IN SECOND TRIMESTER: ICD-10-CM

## 2024-08-30 DIAGNOSIS — Z34.02 ENCOUNTER FOR SUPERVISION OF NORMAL FIRST PREGNANCY, SECOND TRIMESTER: Primary | ICD-10-CM

## 2024-08-30 DIAGNOSIS — O44.02 PLACENTA PREVIA IN SECOND TRIMESTER: ICD-10-CM

## 2024-08-30 LAB
ERYTHROCYTE [DISTWIDTH] IN BLOOD BY AUTOMATED COUNT: 12.4 % (ref 10–15)
HCT VFR BLD AUTO: 38.9 % (ref 35–47)
HGB BLD-MCNC: 13.4 G/DL (ref 11.7–15.7)
MCH RBC QN AUTO: 30.7 PG (ref 26.5–33)
MCHC RBC AUTO-ENTMCNC: 34.4 G/DL (ref 31.5–36.5)
MCV RBC AUTO: 89 FL (ref 78–100)
PLATELET # BLD AUTO: 277 10E3/UL (ref 150–450)
RBC # BLD AUTO: 4.36 10E6/UL (ref 3.8–5.2)
WBC # BLD AUTO: 13.7 10E3/UL (ref 4–11)

## 2024-08-30 PROCEDURE — 76817 TRANSVAGINAL US OBSTETRIC: CPT

## 2024-08-30 PROCEDURE — 99203 OFFICE O/P NEW LOW 30 MIN: CPT | Mod: 25 | Performed by: OBSTETRICS & GYNECOLOGY

## 2024-08-30 PROCEDURE — 99207 PR PRENATAL VISIT: CPT | Performed by: OBSTETRICS & GYNECOLOGY

## 2024-08-30 PROCEDURE — 36415 COLL VENOUS BLD VENIPUNCTURE: CPT | Performed by: OBSTETRICS & GYNECOLOGY

## 2024-08-30 PROCEDURE — 85027 COMPLETE CBC AUTOMATED: CPT | Performed by: OBSTETRICS & GYNECOLOGY

## 2024-08-30 PROCEDURE — 99212 OFFICE O/P EST SF 10 MIN: CPT | Mod: 25 | Performed by: OBSTETRICS & GYNECOLOGY

## 2024-08-30 PROCEDURE — 76811 OB US DETAILED SNGL FETUS: CPT | Mod: 26 | Performed by: OBSTETRICS & GYNECOLOGY

## 2024-08-30 PROCEDURE — 76817 TRANSVAGINAL US OBSTETRIC: CPT | Mod: 26 | Performed by: OBSTETRICS & GYNECOLOGY

## 2024-08-30 NOTE — NURSING NOTE
Patient denies pain, leaking of fluid, or bleeding. Education provided to patient on today's ultrasound.  SBAR given to ANGI SHIELDS, see their note in Epic.

## 2024-08-30 NOTE — PROGRESS NOTES
19w2d overall feeling ok.  Pre-existing low back issues that she sees chiropractor for regularly.  Has run out of insurance coverage for the year, wondering about letter?  I explained that I can't necessarily advocate for more coverage, but will give referral to see if that allows additional visits?  If not, could try PT instead.  Might be starting to feel mvmt?  Discussed.  Had level 2, having a girl.  No report yet, but she tells me it is low lying.  Discussed pelvic rest until improvement confirmed.  Already scheduled at 28w.  RTC 5 weeks for glucola.  [email or web address]

## 2024-08-30 NOTE — PROGRESS NOTES
The patient was seen for an ultrasound in the [facility] at the [facility] today.  For a detailed report of the ultrasound examination, please see the ultrasound report which can be found under the imaging tab.    If you have questions regarding today's evaluation or if we can be of further service, please contact the [facility] Center.    Isabella Monk MD  , OB/GYN  Maternal-Fetal Medicine  [phone] (Pager)

## 2024-09-11 ENCOUNTER — THERAPY VISIT (OUTPATIENT)
Dept: PHYSICAL THERAPY | Facility: CLINIC | Age: 24
End: 2024-09-11
Attending: OBSTETRICS & GYNECOLOGY
Payer: COMMERCIAL

## 2024-09-11 DIAGNOSIS — O26.892 LOW BACK PAIN DURING PREGNANCY IN SECOND TRIMESTER: ICD-10-CM

## 2024-09-11 DIAGNOSIS — M54.50 LOW BACK PAIN DURING PREGNANCY IN SECOND TRIMESTER: ICD-10-CM

## 2024-09-11 PROCEDURE — 97161 PT EVAL LOW COMPLEX 20 MIN: CPT | Mod: GP

## 2024-09-11 PROCEDURE — 97530 THERAPEUTIC ACTIVITIES: CPT | Mod: GP

## 2024-09-11 PROCEDURE — 97110 THERAPEUTIC EXERCISES: CPT | Mod: GP

## 2024-09-11 NOTE — PROGRESS NOTES
PHYSICAL THERAPY EVALUATION  Type of Visit: Evaluation        Fall Risk Screen:  Fall screen completed by: PT  Have you fallen 2 or more times in the past year?: No  Have you fallen and had an injury in the past year?: No  Is patient a fall risk?: No    Subjective       Presenting condition or subjective complaint: Low back pain. Pain can spasm into L LE which stops her in her tracks then resolves. Ever since tailbone injury in 2013 she has had chronic LBP. Pain is worse L>R. Increased symptoms with RDLs, deadlifts, pushing, sitting, walking >1 mile. Was sick chronically in her 1st trimester, so she feels deconditioned. 21 weeks pregnant.   Date of onset: 09/06/24    Relevant medical history: Pregnant or breastfeeding   Dates & types of surgery:      Prior diagnostic imaging/testing results:       Prior therapy history for the same diagnosis, illness or injury: Yes PT after a low back injury in 2013    Prior Level of Function  Transfers:   Ambulation:   ADL:   IADL:     Living Environment  Social support: With a significant other or spouse   Type of home: House   Stairs to enter the home: Yes 3 Is there a railing: No     Ramp: No   Stairs inside the home: Yes 10 Is there a railing: Yes     Help at home: None  Equipment owned:       Employment: Yes Payroll Admin  Hobbies/Interests: Exercising, being outside, lounging, playing games    Patient goals for therapy: Sit for long periods of time, walk for long distances (45-60 minutes).     Pain assessment:      Objective   LUMBAR SPINE EVALUATION  PAIN: Pain Level at Rest: 1/10  Pain Level with Use: 9/10  Pain Location: thoracic spine, lumbar spine, and glutes  Pain Quality: Aching, Dull, Nagging, Sharp, and Shooting  Pain Frequency: constant or with flares  Pain is Worst: with activity, sleeping   Pain is Exacerbated By: RDLs, deadlifts, pushing, sitting, walking >1 mile  Pain is Relieved By: cold, heat, rest, use, and chiropractic adjustments, acupuncture  Pain  Progression: Worsened  INTEGUMENTARY (edema, incisions):   POSTURE: Standing Posture: Rounded shoulders, Lordosis increased  Sitting Posture: Lordosis increased  ROM: SG: R SG recreates L LBP. Flexion: reaches toes. Extension: WNL mild pain.   PELVIC/SI SCREEN: Supine to sit test: (+) with R posterior innominate rotation. PSIS palpation: R posterior.   Functional mobility: reaching for object on floor: reaches with unilateral arm with forward flexion and rotation, some squatting       Assessment & Plan   CLINICAL IMPRESSIONS  Medical Diagnosis: Low back pain during pregnancy in second trimester    Treatment Diagnosis: Low back pain   Impression/Assessment: Patient is a 23 year old female with low back pain, changes in walking tolerance, difficulty performing ADLs due to back pain complaints.  The following significant findings have been identified: Pain, Decreased ROM/flexibility, Impaired gait, Impaired muscle performance, Decreased activity tolerance, and Impaired posture. These impairments interfere with their ability to perform self care tasks, work tasks, recreational activities, and community mobility as compared to previous level of function.     Clinical Decision Making (Complexity):  Clinical Presentation: Stable/Uncomplicated  Clinical Presentation Rationale: based on medical and personal factors listed in PT evaluation  Clinical Decision Making (Complexity): Low complexity    PLAN OF CARE  Treatment Interventions:  Modalities: Ultrasound  Interventions: Manual Therapy, Neuromuscular Re-education, Therapeutic Activity, Therapeutic Exercise, Self-Care/Home Management    Long Term Goals     PT Goal 1  Goal Identifier: walking  Goal Description: In 12 weeks, patient will be able to walk for at least 45 minutes without low back pain to exercise and perform weekly shopping  Rationale: to maximize safety and independence with self cares;to maximize safety and independence with performance of ADLs and functional  tasks;to maximize safety and independence within the community  Target Date: 12/03/24  PT Goal 2  Goal Identifier: LBP  Goal Description: In 12 weeks patient will report at least 75% improved symptoms in order to perform daily and occupational tasks  Rationale: to maximize safety and independence with performance of ADLs and functional tasks;to maximize safety and independence within the home;to maximize safety and independence within the community  Target Date: 12/03/24  PT Goal 3  Goal Identifier: Functional activity  Goal Description: In 12 weeks, patient will be able to lift at least 15 pounds from knee to waist height and carry it at least 30 feet in order to lift and carry groceries without pain  Rationale: to maximize safety and independence within the home;to maximize safety and independence with performance of ADLs and functional tasks;to maximize safety and independence within the community  Target Date: 12/03/24      Frequency of Treatment: 1x per week for 3 weeks, 1x per 2 weeks for 9 weeks, adjusting frequency as indicated by patient presentation  Duration of Treatment: 12 weeks    Recommended Referrals to Other Professionals:   Education Assessment:   Learner/Method: Patient    Risks and benefits of evaluation/treatment have been explained.   Patient/Family/caregiver agrees with Plan of Care.     Evaluation Time:     PT Eval, Low Complexity Minutes (60387): 18       Signing Clinician: Emily Pratt PT

## 2024-09-26 DIAGNOSIS — Z34.02 ENCOUNTER FOR SUPERVISION OF NORMAL FIRST PREGNANCY IN SECOND TRIMESTER: Primary | ICD-10-CM

## 2024-10-03 ENCOUNTER — LAB (OUTPATIENT)
Dept: LAB | Facility: CLINIC | Age: 24
End: 2024-10-03
Payer: COMMERCIAL

## 2024-10-03 DIAGNOSIS — Z34.02 ENCOUNTER FOR SUPERVISION OF NORMAL FIRST PREGNANCY IN SECOND TRIMESTER: ICD-10-CM

## 2024-10-03 LAB — GLUCOSE 1H P 50 G GLC PO SERPL-MCNC: 75 MG/DL (ref 70–129)

## 2024-10-03 PROCEDURE — 36415 COLL VENOUS BLD VENIPUNCTURE: CPT

## 2024-10-03 PROCEDURE — 82950 GLUCOSE TEST: CPT

## 2024-10-23 ENCOUNTER — THERAPY VISIT (OUTPATIENT)
Dept: PHYSICAL THERAPY | Facility: CLINIC | Age: 24
End: 2024-10-23
Payer: COMMERCIAL

## 2024-10-23 DIAGNOSIS — O26.892 LOW BACK PAIN DURING PREGNANCY IN SECOND TRIMESTER: Primary | ICD-10-CM

## 2024-10-23 DIAGNOSIS — M54.50 LOW BACK PAIN DURING PREGNANCY IN SECOND TRIMESTER: Primary | ICD-10-CM

## 2024-10-23 PROCEDURE — 97110 THERAPEUTIC EXERCISES: CPT | Mod: GP

## 2024-10-23 PROCEDURE — 97530 THERAPEUTIC ACTIVITIES: CPT | Mod: GP

## 2024-11-01 ENCOUNTER — OFFICE VISIT (OUTPATIENT)
Dept: MATERNAL FETAL MEDICINE | Facility: CLINIC | Age: 24
End: 2024-11-01
Attending: STUDENT IN AN ORGANIZED HEALTH CARE EDUCATION/TRAINING PROGRAM
Payer: COMMERCIAL

## 2024-11-01 ENCOUNTER — HOSPITAL ENCOUNTER (OUTPATIENT)
Dept: ULTRASOUND IMAGING | Facility: CLINIC | Age: 24
Discharge: HOME | End: 2024-11-01
Attending: STUDENT IN AN ORGANIZED HEALTH CARE EDUCATION/TRAINING PROGRAM
Payer: COMMERCIAL

## 2024-11-01 DIAGNOSIS — O44.02 PLACENTA PREVIA IN SECOND TRIMESTER: ICD-10-CM

## 2024-11-01 DIAGNOSIS — O44.40 ENCOUNTER FOR REPEAT ULTRASOUND FOR LOW LYING PLACENTA, ANTEPARTUM: Primary | ICD-10-CM

## 2024-11-01 DIAGNOSIS — O99.210 OBESITY DURING PREGNANCY: ICD-10-CM

## 2024-11-01 PROCEDURE — 76817 TRANSVAGINAL US OBSTETRIC: CPT

## 2024-11-01 PROCEDURE — 76817 TRANSVAGINAL US OBSTETRIC: CPT | Mod: 26 | Performed by: STUDENT IN AN ORGANIZED HEALTH CARE EDUCATION/TRAINING PROGRAM

## 2024-11-01 PROCEDURE — 76816 OB US FOLLOW-UP PER FETUS: CPT | Mod: 26 | Performed by: STUDENT IN AN ORGANIZED HEALTH CARE EDUCATION/TRAINING PROGRAM

## 2024-11-01 PROCEDURE — 99212 OFFICE O/P EST SF 10 MIN: CPT | Mod: 25 | Performed by: STUDENT IN AN ORGANIZED HEALTH CARE EDUCATION/TRAINING PROGRAM

## 2024-11-01 NOTE — PROGRESS NOTES
The patient was seen for an ultrasound in the [facility] today.  For a detailed report of the ultrasound examination, please see the ultrasound report which can be found under the imaging tab.    If you have questions regarding today's evaluation or if we can be of further service, please contact the [facility] Center.    Rachel Elliott MD  , OB/GYN  Maternal-Fetal Medicine

## 2024-11-01 NOTE — NURSING NOTE
Patient reports + fetal movement, no pain, no contractions, leaking of fluid, or bleeding.  .  Education provided to patient on today's visit.  SBAR given to [facility], see their note in Epic.

## 2024-11-04 ENCOUNTER — PRENATAL OFFICE VISIT (OUTPATIENT)
Dept: OBGYN | Facility: CLINIC | Age: 24
End: 2024-11-04
Payer: COMMERCIAL

## 2024-11-04 VITALS
BODY MASS INDEX: 38.69 KG/M2 | DIASTOLIC BLOOD PRESSURE: 81 MMHG | HEART RATE: 91 BPM | WEIGHT: 225.4 LBS | SYSTOLIC BLOOD PRESSURE: 136 MMHG | OXYGEN SATURATION: 95 % | TEMPERATURE: 97.6 F

## 2024-11-04 DIAGNOSIS — Z23 NEED FOR VACCINATION: ICD-10-CM

## 2024-11-04 DIAGNOSIS — Z23 HIGH PRIORITY FOR 2019-NCOV VACCINE: ICD-10-CM

## 2024-11-04 DIAGNOSIS — Z34.03 ENCOUNTER FOR SUPERVISION OF NORMAL FIRST PREGNANCY IN THIRD TRIMESTER: Primary | ICD-10-CM

## 2024-11-04 DIAGNOSIS — Z23 NEED FOR PROPHYLACTIC VACCINATION AND INOCULATION AGAINST INFLUENZA: ICD-10-CM

## 2024-11-04 PROCEDURE — 90715 TDAP VACCINE 7 YRS/> IM: CPT | Performed by: OBSTETRICS & GYNECOLOGY

## 2024-11-04 PROCEDURE — 90656 IIV3 VACC NO PRSV 0.5 ML IM: CPT | Performed by: OBSTETRICS & GYNECOLOGY

## 2024-11-04 PROCEDURE — 90472 IMMUNIZATION ADMIN EACH ADD: CPT | Performed by: OBSTETRICS & GYNECOLOGY

## 2024-11-04 PROCEDURE — 91320 SARSCV2 VAC 30MCG TRS-SUC IM: CPT | Performed by: OBSTETRICS & GYNECOLOGY

## 2024-11-04 PROCEDURE — 90471 IMMUNIZATION ADMIN: CPT | Performed by: OBSTETRICS & GYNECOLOGY

## 2024-11-04 PROCEDURE — 90480 ADMN SARSCOV2 VAC 1/ONLY CMP: CPT | Performed by: OBSTETRICS & GYNECOLOGY

## 2024-11-04 PROCEDURE — 99207 PR PRENATAL VISIT: CPT | Performed by: OBSTETRICS & GYNECOLOGY

## 2024-11-04 NOTE — PROGRESS NOTES
28w5d feeling good, no c/o.  Good fm.  24w visit was cancelled and not rescheduled until now, discussed.  Normal glucola.  Has repeat mfm us and previa resolved.  Tdap, covid flu today.  RTC 4 weeks  jlp

## 2024-11-06 NOTE — TELEPHONE ENCOUNTER
IV therapy plan removed per provider order as patient did not want to complete them.    Samantha DONIS RN   [facility] Triage RN'

## 2024-11-07 ENCOUNTER — THERAPY VISIT (OUTPATIENT)
Dept: PHYSICAL THERAPY | Facility: CLINIC | Age: 24
End: 2024-11-07
Payer: COMMERCIAL

## 2024-11-07 DIAGNOSIS — O26.892 LOW BACK PAIN DURING PREGNANCY IN SECOND TRIMESTER: Primary | ICD-10-CM

## 2024-11-07 DIAGNOSIS — M54.50 LOW BACK PAIN DURING PREGNANCY IN SECOND TRIMESTER: Primary | ICD-10-CM

## 2024-11-07 PROCEDURE — 97110 THERAPEUTIC EXERCISES: CPT | Mod: GP

## 2024-11-07 PROCEDURE — 97530 THERAPEUTIC ACTIVITIES: CPT | Mod: GP

## 2024-11-07 PROCEDURE — 97140 MANUAL THERAPY 1/> REGIONS: CPT | Mod: GP

## 2024-11-16 ENCOUNTER — NURSE TRIAGE (OUTPATIENT)
Dept: NURSING | Facility: CLINIC | Age: 24
End: 2024-11-16
Payer: COMMERCIAL

## 2024-11-16 ENCOUNTER — HOSPITAL ENCOUNTER (OUTPATIENT)
Facility: CLINIC | Age: 24
Discharge: HOME | End: 2024-11-16
Attending: OBSTETRICS & GYNECOLOGY | Admitting: OBSTETRICS & GYNECOLOGY
Payer: COMMERCIAL

## 2024-11-16 VITALS — DIASTOLIC BLOOD PRESSURE: 77 MMHG | TEMPERATURE: 98.3 F | SYSTOLIC BLOOD PRESSURE: 113 MMHG

## 2024-11-16 DIAGNOSIS — O26.899 RIGHT UPPER QUADRANT ABDOMINAL PAIN AFFECTING PREGNANCY: Primary | ICD-10-CM

## 2024-11-16 DIAGNOSIS — R10.11 RIGHT UPPER QUADRANT ABDOMINAL PAIN AFFECTING PREGNANCY: Primary | ICD-10-CM

## 2024-11-16 LAB
ALBUMIN SERPL BCG-MCNC: 3.4 G/DL (ref 3.5–5.2)
ALP SERPL-CCNC: 139 U/L (ref 40–150)
ALT SERPL W P-5'-P-CCNC: 9 U/L (ref 0–50)
AMYLASE SERPL-CCNC: 102 U/L (ref 28–100)
ANION GAP SERPL CALCULATED.3IONS-SCNC: 13 MMOL/L (ref 7–15)
AST SERPL W P-5'-P-CCNC: 9 U/L (ref 0–45)
BILIRUB SERPL-MCNC: 0.4 MG/DL
BUN SERPL-MCNC: 8.5 MG/DL (ref 6–20)
CALCIUM SERPL-MCNC: 8.7 MG/DL (ref 8.8–10.4)
CHLORIDE SERPL-SCNC: 104 MMOL/L (ref 98–107)
CREAT SERPL-MCNC: 0.53 MG/DL (ref 0.51–0.95)
EGFRCR SERPLBLD CKD-EPI 2021: >90 ML/MIN/1.73M2
GLUCOSE SERPL-MCNC: 129 MG/DL (ref 70–99)
HCO3 SERPL-SCNC: 20 MMOL/L (ref 22–29)
LIPASE SERPL-CCNC: 67 U/L (ref 13–60)
POTASSIUM SERPL-SCNC: 3.9 MMOL/L (ref 3.4–5.3)
PROT SERPL-MCNC: 6.3 G/DL (ref 6.4–8.3)
SODIUM SERPL-SCNC: 137 MMOL/L (ref 135–145)

## 2024-11-16 PROCEDURE — G0463 HOSPITAL OUTPT CLINIC VISIT: HCPCS | Mod: 25

## 2024-11-16 PROCEDURE — 36415 COLL VENOUS BLD VENIPUNCTURE: CPT

## 2024-11-16 PROCEDURE — 82150 ASSAY OF AMYLASE: CPT

## 2024-11-16 PROCEDURE — 82310 ASSAY OF CALCIUM: CPT

## 2024-11-16 PROCEDURE — 83690 ASSAY OF LIPASE: CPT

## 2024-11-16 PROCEDURE — 59025 FETAL NON-STRESS TEST: CPT

## 2024-11-16 PROCEDURE — 250N000013 HC RX MED GY IP 250 OP 250 PS 637

## 2024-11-16 PROCEDURE — 82565 ASSAY OF CREATININE: CPT

## 2024-11-16 PROCEDURE — G0463 HOSPITAL OUTPT CLINIC VISIT: HCPCS

## 2024-11-16 PROCEDURE — 84295 ASSAY OF SERUM SODIUM: CPT

## 2024-11-16 RX ORDER — CYCLOBENZAPRINE HCL 10 MG
10 TABLET ORAL 3 TIMES DAILY
Qty: 10 TABLET | Refills: 0 | Status: SHIPPED | OUTPATIENT
Start: 2024-11-17

## 2024-11-16 RX ORDER — CYCLOBENZAPRINE HCL 5 MG
10 TABLET ORAL 3 TIMES DAILY
Status: DISCONTINUED | OUTPATIENT
Start: 2024-11-16 | End: 2024-11-17 | Stop reason: HOSPADM

## 2024-11-16 RX ADMIN — CYCLOBENZAPRINE HYDROCHLORIDE 10 MG: 5 TABLET, FILM COATED ORAL at 21:37

## 2024-11-16 ASSESSMENT — ACTIVITIES OF DAILY LIVING (ADL)
ADLS_ACUITY_SCORE: 0

## 2024-11-17 NOTE — PLAN OF CARE
Pt presents to triage with reports of sharp intermittent right upper abdominal pain that began this morning. + FM. Denies LOF, vaginal bleeding. Denies s/s of UTI or change in vaginal discharge. Denies HA, blurry vision or increased swelling. Pt VSS, afebrile. Placed on EFM/toco, FHT appears AGA. Dr. Jain paged regarding patient arrival. Report given to NEETU Cr.

## 2024-11-17 NOTE — PROGRESS NOTES
L&D Triage Progress Note     HPI: Phoebe Morris is a 23 year old  at 30w3d by LMP c/w 10w4d US, here for RUQ abdominal pain.     Phoebe has had ongoing aches and pains of pregnancy, including pelvic discomfort that feels similar to when you have sat on a bike seat for a prolonged period of time. Today, she began to have a pain which is new to her in her right upper quadrant. This pain is generally dull and achy but does intermittently become sharp. It does not seem to be associated with anything (movement, eating), and there isn't anything in particular that makes it feel better. She has been hesitant to take too much tylenol as she does not want to become dependent on this. She wonders if it is round ligament pain.    No contractions, vaginal bleeding, or loss of fluid. Noting good fetal movement. No headaches, vision changes, chest pain, shortness of breath, or acute swelling. No fevers, chills, nausea, vomiting. Some constipation which is normal for her, no diarrhea or dysuria.    Pregnancy notable for:  - Placenta previa, resolved  - BMI >35    Lab Results   Component Value Date    AS Negative 2024    HEPBANG Nonreactive 2024    CHPCRT Negative 2023    GCPCRT Negative 2023    HGB 13.4 2024     GBS Status: unknown    OBHX:  OB History    Para Term  AB Living   1 0 0 0 0 0   SAB IAB Ectopic Multiple Live Births   0 0 0 0 0      # Outcome Date GA Lbr Napoleon/2nd Weight Sex Type Anes PTL Lv   1 Current                MedicalHX:  Past Medical History:   Diagnosis Date    Obesity        SurgicalHX:  Past Surgical History:   Procedure Laterality Date    NO HISTORY OF SURGERY  2020       Medications:  No current facility-administered medications for this encounter.       Allergies:  No Known Allergies    FamilyHX:      Family History   Problem Relation Age of Onset    No Known Problems Mother     Diabetes Father         Type 2    No Known Problems Maternal  Grandmother     Diabetes Maternal Grandfather     Myocardial Infarction Paternal Grandmother         Heart attack    Hypertension Paternal Grandmother     No Known Problems Brother     No Known Problems Brother        SocialHX:  Social History     Socioeconomic History    Marital status: Single   Tobacco Use    Smoking status: Never    Smokeless tobacco: Never   Vaping Use    Vaping status: Never Used   Substance and Sexual Activity    Alcohol use: Not Currently     Comment: 1-2x every couple weeks - on social occasions    Drug use: No    Sexual activity: Yes     Partners: Male   Other Topics Concern    Parent/sibling w/ CABG, MI or angioplasty before 65F 55M? No       Physical Exam:  Vitals:    24 1855   BP: 113/77   BP Location: Left arm   Patient Position: Semi-Elias's   Cuff Size: Adult Regular   Temp: 98.3  F (36.8  C)   TempSrc: Oral     GEN: resting comfortably in bed, NAD   CV: Regular rate, well perfused  PULM: On room air, no increased work of breathing  ABD: soft, gravid, non-tender, non-distended  EXT: no edema, non tender to palpation    NST:  FHT: baseline 135, moderate variability, + accels, no decels  TOCO: 0 ctx in ten minutes    A/P: 23 year old  at 30w3d by LMP c/w 10w4d US, here for new RUQ abdominal pain. Pregnancy notable for BMI >35 and resolved placenta previa.     RUQ Abdominal Pain  Differential broad but mainly includes GI or hepatobiliary concerns vs musculoskeletal pain. Based on lack of patient reported contractions on the monitor, location of pain, and lack of patient reported contractions, low concern for  labor. Patient appears comfortable at this time.  - CMP obtained, normal LFTS and bilirubin, lower concern for hepatobiliary concerns. However, amylase and lipase are both mildly elevated. Will repeat amylase and lipase in the outpatient setting.  - Discussed use of a pregnancy support belt, particularly to help with pelvic pain. Can also consider PT in the  outpatient setting.  - Discharged with a few doses of flexeril prn to help with musculoskeletal discomforts.    Fetal Well-Being  - Reactive and reassuring on fetal monitoring. Category 1. Reactive NST.    Dispo: to home    Patient discussed with Dr. Sukh Jain MD  OB/GYN PGY-3  2024 8:55 PM        Physician Attestation   I personally examined and evaluated this patient.  I discussed the patient with the resident/fellow and care team, and agree with the assessment and plan of care as documented in the note.     Key findings: 23 year old  at 30w3d with RUQ pain. Normotensive. Amylase and lipase very mildly elevated, normal LFTs. Pain minimal and pt feels comfortable discharging to home with follow up in clinic later this week, we can repeat labs. Return precautions given.     Please see A&P for additional details of medical decision making.      uJstine Luz MD  Date of Service (when I saw the patient): 24

## 2024-11-17 NOTE — TELEPHONE ENCOUNTER
"  OB Triage Call      Is patient's OB/Midwife with the [facility] or [facility]? [facility]- Proceed with triage     Reason for call: sharp right sided abdominal pain that began this am, rates pain a 5/10 now, however, was very sharp earlier and now is a dull aching pain     Denies leaking of fluid, bleeding, vision changes, headache, fever    Assessment: right sided upper abdominal pain    Plan: Go to [facility]    Patient plans to deliver at [facility]    Patient's primary OB Provider is Mino.      Per protocol recommendations Patient to be evaluated in L&D. Patient's primary OB is [facility].  Labor and delivery at [facility] ([phone]) notified of patient's pending arrival.  Report given to Kaylee.      Is patient's delivering hospital on divert? No      30w3d    Estimated Date of Delivery: 2025        OB History    Para Term  AB Living   1 0 0 0 0 0   SAB IAB Ectopic Multiple Live Births   0 0 0 0 0      # Outcome Date GA Lbr Napoleon/2nd Weight Sex Type Anes PTL Lv   1 Current                No results found for: \"GBS\"       Samantha Pruett RN       Reason for Disposition   MODERATE-SEVERE abdominal pain (e.g., interferes with normal activities, awakens from sleep)    Additional Information   Negative: Passed out (i.e., lost consciousness, collapsed and was not responding)   Negative: Shock suspected (e.g., cold/pale/clammy skin, too weak to stand, low BP, rapid pulse)   Negative: Difficult to awaken or acting confused (e.g., disoriented, slurred speech)   Negative: [1] SEVERE abdominal pain (e.g., excruciating) AND [2] constant AND [3] present > 1 hour   Negative: SEVERE vaginal bleeding (e.g., continuous red blood from vagina, large blood clots)   Negative: Sounds like a life-threatening emergency to the triager   Negative: Followed an abdomen (stomach) injury   Negative: [1] Vomiting AND [2] contains red blood or black (\"coffee ground\") material "  (Exception: Few red streaks in vomit that only happened once.)   Negative: [1] SEVERE headache AND [2] not relieved with acetaminophen (e.g., Tylenol)    Protocols used: Pregnancy - Abdominal Pain Greater Than 20 Weeks EGA-A-AH

## 2024-11-17 NOTE — PROGRESS NOTES
Data: Patient assessed in the [facility] for abdominal pain. Cervical exam deferred. Membranes intact. Contractions are not present. See flowsheets for fetal assessment documentation.     Action: Presumed adequate fetal oxygenation documented. Discharge instructions reviewed. Patient instructed to report change in fetal movement, vaginal leaking of fluid or bleeding, abdominal pain, or any concerns related to the pregnancy to provider/clinic.      Response: Orders to discharge home per Dr. Luz and Dr. Jain. Patient verbalized understanding of education and agreement with plan. Discharged to home at 2158.

## 2024-12-05 ENCOUNTER — PRENATAL OFFICE VISIT (OUTPATIENT)
Dept: OBGYN | Facility: CLINIC | Age: 24
End: 2024-12-05
Payer: COMMERCIAL

## 2024-12-05 VITALS
DIASTOLIC BLOOD PRESSURE: 80 MMHG | WEIGHT: 229 LBS | SYSTOLIC BLOOD PRESSURE: 120 MMHG | HEART RATE: 126 BPM | BODY MASS INDEX: 39.31 KG/M2 | OXYGEN SATURATION: 97 %

## 2024-12-05 DIAGNOSIS — Z29.11 NEED FOR RSV IMMUNIZATION: ICD-10-CM

## 2024-12-05 DIAGNOSIS — Z34.83 ENCOUNTER FOR SUPERVISION OF OTHER NORMAL PREGNANCY, THIRD TRIMESTER: Primary | ICD-10-CM

## 2024-12-05 NOTE — PROGRESS NOTES
Return OB visit    Subjective:  Patient reports active fetal movement, no vaginal bleeding or leaking fluid. She denies contractions. She has no concerns today, RUQ pain has improved.        Objective:  /80 (BP Location: Left arm, Patient Position: Sitting, Cuff Size: Adult Large)   Pulse (!) 126   Wt 103.9 kg (229 lb)   LMP 2024   SpO2 97%   BMI 39.31 kg/m     See OB flow sheet    Assessment and Plan    Phoebe Morris is a 23 year old  at 33w1d here for ALMA visit, pregnancy uncomplicated     This visit:  -RSV vaccine given today   -Patient took [facility] parenting classes, no specific birth plan but open to epidural if needed     Next visit:  -Routine PNC     RTC in 2 weeks or sooner PRCHILANGO Benz MD

## 2024-12-10 ENCOUNTER — HOSPITAL ENCOUNTER (OUTPATIENT)
Facility: CLINIC | Age: 24
Discharge: HOME | End: 2024-12-10
Attending: OBSTETRICS & GYNECOLOGY | Admitting: OBSTETRICS & GYNECOLOGY
Payer: COMMERCIAL

## 2024-12-10 ENCOUNTER — TELEPHONE (OUTPATIENT)
Dept: OBGYN | Facility: CLINIC | Age: 24
End: 2024-12-10
Payer: COMMERCIAL

## 2024-12-10 VITALS
TEMPERATURE: 98.3 F | BODY MASS INDEX: 38.41 KG/M2 | WEIGHT: 225 LBS | RESPIRATION RATE: 18 BRPM | OXYGEN SATURATION: 98 % | HEIGHT: 64 IN | DIASTOLIC BLOOD PRESSURE: 78 MMHG | SYSTOLIC BLOOD PRESSURE: 126 MMHG

## 2024-12-10 PROCEDURE — 59025 FETAL NON-STRESS TEST: CPT

## 2024-12-10 PROCEDURE — 99213 OFFICE O/P EST LOW 20 MIN: CPT | Mod: 25 | Performed by: OBSTETRICS & GYNECOLOGY

## 2024-12-10 PROCEDURE — G0463 HOSPITAL OUTPT CLINIC VISIT: HCPCS | Mod: 25

## 2024-12-10 PROCEDURE — 59025 FETAL NON-STRESS TEST: CPT | Mod: 26 | Performed by: OBSTETRICS & GYNECOLOGY

## 2024-12-10 ASSESSMENT — ACTIVITIES OF DAILY LIVING (ADL)
ADLS_ACUITY_SCORE: 15
ADLS_ACUITY_SCORE: 41

## 2024-12-10 NOTE — PLAN OF CARE
Patient seen for decreased fetal movement. See flowsheet for fetal and uterine monitoring. VSS, afebrile, no complains. Patient is discharged home and will follow up with the provider.

## 2024-12-10 NOTE — TELEPHONE ENCOUNTER
Pt called back at 1 hour and has had 7 movements in 2 hour. Pt was sent to [facility] for evaluation.

## 2024-12-10 NOTE — TELEPHONE ENCOUNTER
Pt called stating that baby had not moved as much as she normally does but has moved this am.  Pt advised to drink something cold or sweet and to lay down to monitor movements.  To call back in 1 hour to be re assessed.

## 2024-12-10 NOTE — H&P
[facility]    History and Physical  Obstetrics and Gynecology     Date of Admission:  12/10/2024    Assessment & Plan   Phoebe Morris is a 23 year old female who presents with decreased fetal movement.   ASSESSMENT:   IUP @ 33w6d not in labor.  NST reactive.  Category  I    PLAN:   Discharge home   Has close clinic followup  Precautions reviewed.   Sarah Dan MD    History of Present Illness   Phoebe Morris is a 23 year old female  33w6d  Estimated Date of Delivery: 25 is calculated from Patient's last menstrual period was 2024. is [facility] the [facility]  not in labor.    PRENATAL COURSE  Prenatal course was complicated by    Patient Active Problem List    Diagnosis Date Noted    Low back pain during pregnancy in second trimester 2024     Priority: Medium    Nausea/vomiting in pregnancy 2024     Priority: Medium    Family history of diabetes mellitus in father 2024     Priority: Medium         Recent Labs   Lab Test 24  1547   AS Negative     Rhogam not indicated   Recent Labs   Lab Test 24  1547   HEPBANG Nonreactive   HIAGAB Nonreactive   RUQIGG Positive       Past Medical History    I have reviewed this patient's medical history and updated it with pertinent information if needed.   Past Medical History:   Diagnosis Date    Obesity        Past Surgical History   I have reviewed this patient's surgical history and updated it with pertinent information if needed.  Past Surgical History:   Procedure Laterality Date    NO HISTORY OF SURGERY  2020       Prior to Admission Medications   Prior to Admission Medications   Prescriptions Last Dose Informant Patient Reported? Taking?   Prenatal Vit-DSS-Fe Cbn-FA (PRENATAL AD PO)   Yes No   cyclobenzaprine (FLEXERIL) 10 MG tablet   No No   Sig: Take 1 tablet (10 mg) by mouth 3 times daily.   Patient not taking: Reported on 2024   metoclopramide  (REGLAN) 5 MG tablet   No No   Sig: Take 1 tablet (5 mg) by mouth 4 times daily as needed (nausea)   ondansetron (ZOFRAN) 4 MG tablet   No No   Sig: Take 1 tablet (4 mg) by mouth every 8 hours as needed for nausea      Facility-Administered Medications: None     Allergies   No Known Allergies    Social History   I have reviewed this patient's social history and updated it with pertinent information if needed. Phoebe Morris  reports that she has never smoked. She has never been exposed to tobacco smoke. She has never used smokeless tobacco. She reports that she does not currently use alcohol. She reports that she does not use drugs.    Family History   I have reviewed this patient's family history and updated it with pertinent information if needed.   Family History   Problem Relation Age of Onset    No Known Problems Mother     Diabetes Father         Type 2    No Known Problems Maternal Grandmother     Diabetes Maternal Grandfather     Myocardial Infarction Paternal Grandmother         Heart attack    Hypertension Paternal Grandmother     No Known Problems Brother     No Known Problems Brother        Immunization History   Immunizations are up to date    Physical Exam   Temp: 98.3  F (36.8  C) Temp src: Oral BP: 126/78     Resp: 18 SpO2: 98 % O2 Device: None (Room air)    Vital Signs with Ranges  Temp:  [98.3  F (36.8  C)] 98.3  F (36.8  C)  Resp:  [18] 18  BP: (126)/(78) 126/78  SpO2:  [98 %] 98 %    Abdomen: gravid, single vertex fetus, non-tender, EFW 4 lbs 8 ounces      Fetal Heart Tones: 1445 baseline, moderate variablility, + accels, no decels, and Category I  TOCO:   none    Constitutional: healthy, alert, and active   Respiratory: no increased work of breathing  Cardiovascular: no edema  Skin/Extremites: normal skin color, texture, turgor  Neurologic: Awake, alert, oriented to name, place and time.    Neuropsychiatric: General: normal, calm, and normal eye contact

## 2024-12-19 ENCOUNTER — MYC MEDICAL ADVICE (OUTPATIENT)
Dept: OBGYN | Facility: CLINIC | Age: 24
End: 2024-12-19

## 2024-12-19 ENCOUNTER — TRANSCRIBE ORDERS (OUTPATIENT)
Dept: MATERNAL FETAL MEDICINE | Facility: CLINIC | Age: 24
End: 2024-12-19

## 2024-12-19 ENCOUNTER — PRENATAL OFFICE VISIT (OUTPATIENT)
Dept: OBGYN | Facility: CLINIC | Age: 24
End: 2024-12-19
Payer: COMMERCIAL

## 2024-12-19 VITALS
SYSTOLIC BLOOD PRESSURE: 132 MMHG | OXYGEN SATURATION: 99 % | DIASTOLIC BLOOD PRESSURE: 81 MMHG | BODY MASS INDEX: 40.2 KG/M2 | WEIGHT: 234.2 LBS | HEART RATE: 105 BPM | TEMPERATURE: 97.4 F

## 2024-12-19 DIAGNOSIS — O26.893 HEARTBURN IN PREGNANCY IN THIRD TRIMESTER: Primary | ICD-10-CM

## 2024-12-19 DIAGNOSIS — O26.843 UTERINE SIZE-DATE DISCREPANCY IN THIRD TRIMESTER: ICD-10-CM

## 2024-12-19 DIAGNOSIS — R12 HEARTBURN IN PREGNANCY IN THIRD TRIMESTER: Primary | ICD-10-CM

## 2024-12-19 DIAGNOSIS — O26.90 PREGNANCY RELATED CONDITION, ANTEPARTUM: Primary | ICD-10-CM

## 2024-12-19 DIAGNOSIS — Z34.83 ENCOUNTER FOR SUPERVISION OF OTHER NORMAL PREGNANCY, THIRD TRIMESTER: Primary | ICD-10-CM

## 2024-12-19 LAB
AMYLASE SERPL-CCNC: 104 U/L (ref 28–100)
ERYTHROCYTE [DISTWIDTH] IN BLOOD BY AUTOMATED COUNT: 12.2 % (ref 10–15)
HCT VFR BLD AUTO: 38.4 % (ref 35–47)
HGB BLD-MCNC: 13.1 G/DL (ref 11.7–15.7)
HOLD SPECIMEN: NORMAL
LIPASE SERPL-CCNC: 64 U/L (ref 13–60)
MCH RBC QN AUTO: 29.1 PG (ref 26.5–33)
MCHC RBC AUTO-ENTMCNC: 34.1 G/DL (ref 31.5–36.5)
MCV RBC AUTO: 85 FL (ref 78–100)
PLATELET # BLD AUTO: 220 10E3/UL (ref 150–450)
RBC # BLD AUTO: 4.5 10E6/UL (ref 3.8–5.2)
WBC # BLD AUTO: 12.8 10E3/UL (ref 4–11)

## 2024-12-19 RX ORDER — PANTOPRAZOLE SODIUM 20 MG/1
20 TABLET, DELAYED RELEASE ORAL 2 TIMES DAILY
Qty: 60 TABLET | Refills: 0 | Status: SHIPPED | OUTPATIENT
Start: 2024-12-19

## 2024-12-19 NOTE — PROGRESS NOTES
35w1d   Getting very uncomfortable. Hard to sleep at night.  Baby has been active. Occ ctx's.    Was seen on labor and delivery 9 days ago for decreased FM. Since then movement has been reassuring.  On exam S>D , measuring at 40 cm.  Will get US for growth.   GBS and CBC today.  Will repeat amylase and lipase also.  RR

## 2024-12-19 NOTE — PATIENT INSTRUCTIONS
"Week 37 of Your Pregnancy: Care Instructions    Most babies are born between 37 and 40 weeks.   This is a good time to pack a bag to take with you to the birth. Then it will be ready to go when you are.     Learn about breastfeeding.  For example, find out about ways to hold your baby to make breastfeeding easier. And think about learning how to pump and store milk.     Know that crying is normal.  It's common for babies to cry 1 to 3 hours a day. Some cry more, and some cry less.     Learn why babies cry.  They may be hungry; have gas; need a diaper change; or feel cold, warm, tired, lonely, or tense. Sometimes they cry for unknown reasons.     Think about what will help you stay calm when your baby cries.  Taking slow, deep breaths can help. So can taking a break. It's okay to put your baby somewhere safe (like their crib) and walk away for a few minutes.     Learn about safe sleep for your baby.  Always put your baby to sleep on their back. Place them alone in a crib or bassinet with a firm, flat surface. Keep soft items like stuffed animals out of the crib.     Learn what to expect with  poop.  Your baby will have their own bowel patterns. Some babies have several bowel movements a day. Some have fewer.     Know that  babies will often have loose, yellow bowel movements.  Formula-fed babies have more formed stools. If your baby's poop looks like pellets, your baby is constipated.   Follow-up care is a key part of your treatment and safety. Be sure to make and go to all appointments, and call your doctor if you are having problems. It's also a good idea to know your test results and keep a list of the medicines you take.  Where can you learn more?  Go to https://www.[employer].net/patiented  Enter N257 in the search box to learn more about \"Week 37 of Your Pregnancy: Care Instructions.\"  Current as of: 2024  Content Version: 14.3    2024 [facility] [employer].   Care instructions " adapted under license by your healthcare professional. If you have questions about a medical condition or this instruction, always ask your healthcare professional. [employer] disclaims any warranty or liability for your use of this information.    Week 38 of Your Pregnancy: Care Instructions  Believe it or not, your baby is almost here. You may notice how your baby responds to sounds, warmth, cold, and light. You may even know what kind of music your baby likes.    Even if you expect a vaginal birth, it's a good idea to learn about  section ().  is the delivery of a baby through a cut (incision) in your belly. It's a major surgery, so it has more risks than a vaginal birth.   During the first 2 weeks after the birth, limit visitors. Don't allow visitors who have colds or infections. Ask visitors to wash their hands before they touch your baby. And never let anyone smoke around your baby.     Know about unplanned C-sections.  Reasons for an unplanned  include labor that slows or stops, signs of distress in your baby, and high blood pressure or other problems for you.     Know about planned C-sections.  If your baby isn't in a head-down position for delivery (breech position), your doctor may plan a . Or you may have a planned  if you're having twins or more.     Get as much help as you can while you're in the hospital.  You can learn about feeding, diapering, and bathing your baby.     Talk to your doctor or midwife about how to care for the umbilical cord stump.  If your baby will be circumcised, also ask about how to care for that.     Ask friends or family for help, as you need it.  If you can, have another adult in your home for at least 2 or 3 days after the birth.     Try to nap when your baby naps.  This may be your best chance to get some sleep.     Watch for changes in your mental health.  For the first 1 to 2 weeks after birth, it's common to  "cry or feel sad or irritable. If these feelings last for more than 2 weeks, you may have postpartum depression. Ask your doctor for help. Postpartum depression can be treated.   Follow-up care is a key part of your treatment and safety. Be sure to make and go to all appointments, and call your doctor if you are having problems. It's also a good idea to know your test results and keep a list of the medicines you take.  Where can you learn more?  Go to https://www.[employer]omat.net/patiented  Enter B044 in the search box to learn more about \"Week 38 of Your Pregnancy: Care Instructions.\"  Current as of: 2024  Content Version: 14.3    2024 [employer].   Care instructions adapted under license by your healthcare professional. If you have questions about a medical condition or this instruction, always ask your healthcare professional. [employer] disclaims any warranty or liability for your use of this information.    Week 39 of Your Pregnancy: Care Instructions     babies can look different than what you see in pictures or movies. Their heads can be a strange shape right after birth. And they may have swollen eyes and red marks on their faces.   You can still get pregnant even if you are breastfeeding. If you don't want to get pregnant, talk to your doctor about birth control.   Tips for week 39 of pregnancy  If you plan to breastfeed, get prepared.       Continue to eat healthy foods.  Keep taking your prenatal vitamins during breastfeeding if your doctor recommends it.  Talk to your doctor before taking any medicines or supplements.  Choose the right birth control for you.       Intrauterine devices (IUDs) are placed in the uterus. Sometimes the IUD can be placed right after giving birth. They work for years.  Hormonal implants are placed under the skin of the arm. They also work for years.  Depo-Provera is a shot. You get it every 3 months.  Birth control pills can be used. " "They're taken every day.  Tubal ligation (tying your tubes) and vasectomy are surgeries. They're permanent.  Diaphragms, spermicide, and condoms must be used each time you have sex. If you used a diaphragm before, you should get refitted after the baby is born.  A birth control patch or ring can be used. These just can't be started until several weeks after you give birth.  Follow-up care is a key part of your treatment and safety. Be sure to make and go to all appointments, and call your doctor if you are having problems. It's also a good idea to know your test results and keep a list of the medicines you take.  Where can you learn more?  Go to https://www.gamigo.[employer]/patiented  Enter A811 in the search box to learn more about \"Week 39 of Your Pregnancy: Care Instructions.\"  Current as of: 2024  Content Version: 14.3    2024 [employer].   Care instructions adapted under license by your healthcare professional. If you have questions about a medical condition or this instruction, always ask your healthcare professional. [employer] disclaims any warranty or liability for your use of this information.    Weeks 40 to 41 of Your Pregnancy: Care Instructions  By week 40, you've reached your due date. Your baby could be coming any day. Most babies born after their due dates are healthy. But you may have tests to make sure everything is okay. If you feel stressed, you could try a meditation exercise, such as guided imagery. It may help you relax.    If you are past 41 weeks, your doctor may measure the amount of fluid that surrounds your baby. They may also test your baby's movement and heart rate.   If you don't start labor on your own by 41 or 42 weeks, your doctor may want to start (induce) labor. If there are other concerns, your doctor may talk to you about a .   To start (induce) your labor, your doctor may do any of these things.    Place a narrow tube with a small " "balloon on the end (balloon catheter) into your cervix.  The doctor inflates the balloon. This helps your cervix open (dilate).     Sweep the membranes (separate the lining of the amniotic sac from the uterus).  This helps the uterus make a chemical that can start contractions.     \"Break your water\" (rupture the amniotic sac).  This may be done if your cervix has started to open.     Use medicines.  They may be used to soften the cervix or start contractions.   How to do guided imagery    Close your eyes, and take a few deep breaths. Picture a peaceful setting, such as a beach or a meadow. Add a winding path. Follow the path until you feel more and more relaxed.   Take a few minutes to breathe slowly and feel the calm. When you are ready, slowly take yourself back to the present. Count to 3, and open your eyes.   Follow-up care is a key part of your treatment and safety. Be sure to make and go to all appointments, and call your doctor if you are having problems. It's also a good idea to know your test results and keep a list of the medicines you take.  Where can you learn more?  Go to https://www.[employer].net/patiented  Enter T922 in the search box to learn more about \"Weeks 40 to 41 of Your Pregnancy: Care Instructions.\"  Current as of: April 30, 2024  Content Version: 14.3    2024 [employer].   Care instructions adapted under license by your healthcare professional. If you have questions about a medical condition or this instruction, always ask your healthcare professional. [employer] disclaims any warranty or liability for your use of this information.    Labor Induction  What You Need to Know  What is labor induction?  In most cases, it is best to go into labor naturally. When labor does not start on its own, we may use medicine or other methods to start (induce) labor. This is called induction, which:  Helps the uterus contract  Thins, softens and opens the cervix (opening of the " "uterus)  When is induction used?  There are two types of induction.  Medical induction may be done to protect the health of the parent or baby if:  There are medical concerns for you or your baby.  You haven't had your baby by 41 weeks.  Induction for non-medical reasons may be done at 39 weeks or later if:  You live far from the hospital.  You've been having contractions for many days.  You've given birth quickly in the past.   We will not perform an induction for non-medical reasons before 39 weeks. Studies show that babies born before 39 weeks may struggle with breathing, feeding, sleeping and staying warm. They are more likely to have health problems and may need to stay in the hospital longer. If we start your labor for medical reasons, the benefits will outweigh these risks. We will talk to you about your risks, benefits and alternatives (other options) before we start your labor.  How is induction done?  We may start your labor by doing one or more of the following:  We may need to help your cervix soften and open (sometimes called \"ripening\") to prepare it for labor. There are two ways to do this:  Medicines--these may be in the form of pills that you swallow or medicines that are put into the vagina next to the cervix.  Mechanical--using either a single or double balloon. These are small balloons that are attached to tubes that go up inside the cervix. The balloons are then filled with water to put pressure on the cervix and help the cervix soften and open. Depending on the type of balloon used, you may be allowed to go home after it is placed.  After your cervix is ready:  We may give you medicine through an IV (a small tube placed in your vein). This medicine is called Pitocin. It helps the muscles of your uterus to contract.  We may make a small opening in your bag of water (the sac around your baby). This is called an amniotomy. Sometimes called \"breaking the water\". It may help your uterus contract and " your cervix open.  What might happen if my labor is induced?  Some of this depends on how your labor is started and how your body responds. Your labor may be more complicated. You and your baby may need more medical treatments. In general:  You may not go into labor right away. If so, we may send you home with follow-up instructions.  It will be important to monitor you and your baby during the induction.  You may not be able to move around during labor. You will have two belts with monitors attached to your belly. These allow the nurse to watch your contractions and your baby's heart rate.  Your labor may take longer than if you went into labor on your own. It might take more than one day.  If you need cervical ripening, it is important to know that it may be many hours (even days) until delivery happens.  Cervical ripening can be slow and require several doses before your body is ready to labor.  A long labor may increase the risk of infection in mother and baby.  Your labor may not progress as planned. This could lead to a  birth.  Can I plan the date of my delivery?  After 39 weeks, you may ask about planning your delivery date. This is only an option if your body--and your baby--are ready. To find out, we will check your cervix and your baby's heart rate.   If you are ready to be induced, we will give you a date and time to come to the hospital. If many patients are in labor that day, we may need to start your labor at another time.  If you are not ready, we will not start your labor. It will be safer for your baby to come on its own.  What else do I need to know?  Before you have an induction, make sure you understand the reasons, risks and benefits. Ask your doctor or nurse-midwife:  Why do I need to be induced?  What are the risks to my baby?  How will you start my labor?  How will you know if my baby is ready to be born?  How will you know if my body is ready to give birth?  Where can I get more  information?  To learn more about induction, you may visit these websites:  The American College of Nurse-Midwives:  www.mymidwife.org  The American College of Obstetricians and Gynecologists: www.acog.org  Childbirth Connection:  www.childbirthconnection.org  March of Dimes: www.marchofdimes.com  Association of Women's Health, Obstetrics, and  Nursing  www.gouhgo3sww.org/go-the-full-40&#047;  For informational purposes only. Not to replace the advice of your health care provider. Copyright   2008 [employer]. All rights reserved. Clinically reviewed by the  System Operations Leadership team. [employer] [id number] - REV .

## 2024-12-26 ENCOUNTER — PRENATAL OFFICE VISIT (OUTPATIENT)
Dept: OBGYN | Facility: CLINIC | Age: 24
End: 2024-12-26
Payer: COMMERCIAL

## 2024-12-26 VITALS
DIASTOLIC BLOOD PRESSURE: 78 MMHG | HEART RATE: 122 BPM | BODY MASS INDEX: 40.27 KG/M2 | OXYGEN SATURATION: 98 % | SYSTOLIC BLOOD PRESSURE: 112 MMHG | WEIGHT: 234.6 LBS

## 2024-12-26 DIAGNOSIS — R12 HEARTBURN IN PREGNANCY IN THIRD TRIMESTER: ICD-10-CM

## 2024-12-26 DIAGNOSIS — O26.843 UTERINE SIZE-DATE DISCREPANCY IN THIRD TRIMESTER: ICD-10-CM

## 2024-12-26 DIAGNOSIS — Z34.03 ENCOUNTER FOR SUPERVISION OF NORMAL FIRST PREGNANCY IN THIRD TRIMESTER: Primary | ICD-10-CM

## 2024-12-26 DIAGNOSIS — O26.893 HEARTBURN IN PREGNANCY IN THIRD TRIMESTER: ICD-10-CM

## 2024-12-26 NOTE — PROGRESS NOTES
36w1d   Very uncomfortable.  Having trouble sleeping.  More contractions. Baby is moving a lot. S>D, has growth US tomorrow.  Wondering if she can be induced at 39 wks.  Rec discussing at [facility] after we have US results.    GBS is negative and hgb 13. Reviewed labs from last week.  Amylase and lipase but mildly elevated on a few occasions.  This can be seen with pregnancy. She is not symptomatic.  Rec healthy diet, low fat and follow-up postpartum.  RR

## 2024-12-31 ENCOUNTER — HOSPITAL ENCOUNTER (OUTPATIENT)
Dept: ULTRASOUND IMAGING | Facility: CLINIC | Age: 24
Discharge: HOME | End: 2024-12-31
Attending: STUDENT IN AN ORGANIZED HEALTH CARE EDUCATION/TRAINING PROGRAM
Payer: COMMERCIAL

## 2024-12-31 ENCOUNTER — OFFICE VISIT (OUTPATIENT)
Dept: MATERNAL FETAL MEDICINE | Facility: CLINIC | Age: 24
End: 2024-12-31
Attending: STUDENT IN AN ORGANIZED HEALTH CARE EDUCATION/TRAINING PROGRAM
Payer: COMMERCIAL

## 2024-12-31 DIAGNOSIS — O26.843 UTERINE SIZE-DATE DISCREPANCY IN THIRD TRIMESTER: Primary | ICD-10-CM

## 2024-12-31 DIAGNOSIS — O26.90 PREGNANCY RELATED CONDITION, ANTEPARTUM: ICD-10-CM

## 2024-12-31 PROCEDURE — 76816 OB US FOLLOW-UP PER FETUS: CPT

## 2024-12-31 NOTE — NURSING NOTE
Patient presents to [facility] for RL2 at 36w6d due to size > dates on recent fundal check. Positive fetal movement. Denies LOF, vaginal bleeding or cramping/contractions. SBAR given to KATYA SHIELDS, see their note in Epic.

## 2025-01-02 ENCOUNTER — PRENATAL OFFICE VISIT (OUTPATIENT)
Dept: OBGYN | Facility: CLINIC | Age: 25
End: 2025-01-02
Payer: COMMERCIAL

## 2025-01-02 VITALS
WEIGHT: 234.6 LBS | SYSTOLIC BLOOD PRESSURE: 132 MMHG | DIASTOLIC BLOOD PRESSURE: 82 MMHG | BODY MASS INDEX: 40.27 KG/M2 | HEART RATE: 108 BPM | OXYGEN SATURATION: 98 %

## 2025-01-02 DIAGNOSIS — Z34.03 ENCOUNTER FOR SUPERVISION OF NORMAL FIRST PREGNANCY IN THIRD TRIMESTER: Primary | ICD-10-CM

## 2025-01-02 LAB
ALBUMIN MFR UR ELPH: 31.8 MG/DL
ALT SERPL W P-5'-P-CCNC: 7 U/L (ref 0–50)
AST SERPL W P-5'-P-CCNC: 15 U/L (ref 0–45)
CREAT SERPL-MCNC: 0.6 MG/DL (ref 0.51–0.95)
CREAT UR-MCNC: 166.1 MG/DL
EGFRCR SERPLBLD CKD-EPI 2021: >90 ML/MIN/1.73M2
ERYTHROCYTE [DISTWIDTH] IN BLOOD BY AUTOMATED COUNT: 12.2 % (ref 10–15)
HCT VFR BLD AUTO: 39.7 % (ref 35–47)
HGB BLD-MCNC: 13.6 G/DL (ref 11.7–15.7)
MCH RBC QN AUTO: 28.8 PG (ref 26.5–33)
MCHC RBC AUTO-ENTMCNC: 34.3 G/DL (ref 31.5–36.5)
MCV RBC AUTO: 84 FL (ref 78–100)
PLATELET # BLD AUTO: 231 10E3/UL (ref 150–450)
PROT/CREAT 24H UR: 0.19 MG/MG CR (ref 0–0.2)
RBC # BLD AUTO: 4.73 10E6/UL (ref 3.8–5.2)
URATE SERPL-MCNC: 3.6 MG/DL (ref 2.4–5.7)
WBC # BLD AUTO: 12.3 10E3/UL (ref 4–11)

## 2025-01-02 NOTE — PROGRESS NOTES
Called and left a message for Teressa to call us back.    Return OB visit    Subjective:  Patient reports active fetal movement, no vaginal bleeding or leaking fluid. She denies contractions. She has no concerns today, but would like to schedule IOL at 39w0d. Her baby was measuring S>D on at her last prenatal visit. EFW on US was 84%ile so not technically LGA or suspected macrosomia but after discussion IOL process with her, she is comfortable proceeding with eIOL. She denies any pre-e symptoms. Has a BP cuff at home but has not been checking BP regularly. No hx of cHTN.      Objective:  /82 (BP Location: Right arm, Patient Position: Sitting, Cuff Size: Adult Large)   Pulse 108   Wt 106.4 kg (234 lb 9.6 oz)   LMP 2024   SpO2 98%   BMI 40.27 kg/m     Initial /91     See OB flow sheet    Assessment and Plan    Phoebe Morris is a 24 year old  at 37w1d here for ALMA visit, pregnancy uncomplicated     This visit:  -Discussed her initial elevated BP. Patient asymptomatic so we reviewed warning signs of pre-e and when to call. Pre-e labs obtained. She will also start checking BP at home and call with any BP greater than 140/90. Discussed that if she were to meet criteria for gHTN or pre-e, that I would recommend moving forward with IOL at that point and patient verbalized agreement with that plan.   -Patient given handouts on tracking BP at home and warning signs for pre-e/gHTN     Next visit:  -Routine PNC     RTC in 1 week or sooner ABDELRAHMAN Benz MD

## 2025-01-08 ENCOUNTER — NURSE TRIAGE (OUTPATIENT)
Dept: NURSING | Facility: CLINIC | Age: 25
End: 2025-01-08
Payer: COMMERCIAL

## 2025-01-08 ENCOUNTER — HOSPITAL ENCOUNTER (OUTPATIENT)
Facility: CLINIC | Age: 25
Discharge: HOME | End: 2025-01-08
Attending: OBSTETRICS & GYNECOLOGY | Admitting: OBSTETRICS & GYNECOLOGY
Payer: COMMERCIAL

## 2025-01-08 VITALS
TEMPERATURE: 98.1 F | BODY MASS INDEX: 39.95 KG/M2 | WEIGHT: 234 LBS | SYSTOLIC BLOOD PRESSURE: 121 MMHG | HEIGHT: 64 IN | DIASTOLIC BLOOD PRESSURE: 79 MMHG | RESPIRATION RATE: 24 BRPM

## 2025-01-08 DIAGNOSIS — O26.899 ABDOMINAL PAIN AFFECTING PREGNANCY: Primary | ICD-10-CM

## 2025-01-08 DIAGNOSIS — R10.9 ABDOMINAL PAIN AFFECTING PREGNANCY: Primary | ICD-10-CM

## 2025-01-08 PROBLEM — Z36.89 ENCOUNTER FOR TRIAGE IN PREGNANT PATIENT: Status: ACTIVE | Noted: 2025-01-08

## 2025-01-08 LAB
ALBUMIN MFR UR ELPH: 15.3 MG/DL
ALBUMIN UR-MCNC: NEGATIVE MG/DL
ALT SERPL W P-5'-P-CCNC: 6 U/L (ref 0–50)
APPEARANCE UR: CLEAR
AST SERPL W P-5'-P-CCNC: 8 U/L (ref 0–45)
BILIRUB UR QL STRIP: NEGATIVE
CLUE CELLS: ABNORMAL
COLOR UR AUTO: ABNORMAL
CREAT SERPL-MCNC: 0.57 MG/DL (ref 0.51–0.95)
CREAT UR-MCNC: 103.2 MG/DL
CRYSTALS AMN MICRO: NORMAL
EGFRCR SERPLBLD CKD-EPI 2021: >90 ML/MIN/1.73M2
ERYTHROCYTE [DISTWIDTH] IN BLOOD BY AUTOMATED COUNT: 12.4 % (ref 10–15)
GLUCOSE UR STRIP-MCNC: 500 MG/DL
HCT VFR BLD AUTO: 36.6 % (ref 35–47)
HGB BLD-MCNC: 12.7 G/DL (ref 11.7–15.7)
HGB UR QL STRIP: NEGATIVE
KETONES UR STRIP-MCNC: NEGATIVE MG/DL
LEUKOCYTE ESTERASE UR QL STRIP: NEGATIVE
MCH RBC QN AUTO: 29.3 PG (ref 26.5–33)
MCHC RBC AUTO-ENTMCNC: 34.7 G/DL (ref 31.5–36.5)
MCV RBC AUTO: 84 FL (ref 78–100)
NITRATE UR QL: NEGATIVE
PH UR STRIP: 6 [PH] (ref 5–7)
PLATELET # BLD AUTO: 235 10E3/UL (ref 150–450)
PROT/CREAT 24H UR: 0.15 MG/MG CR (ref 0–0.2)
RBC # BLD AUTO: 4.34 10E6/UL (ref 3.8–5.2)
SP GR UR STRIP: 1.01 (ref 1–1.03)
TRICHOMONAS, WET PREP: ABNORMAL
UROBILINOGEN UR STRIP-MCNC: NORMAL MG/DL
WBC # BLD AUTO: 12.5 10E3/UL (ref 4–11)
WBC'S/HIGH POWER FIELD, WET PREP: ABNORMAL
YEAST, WET PREP: ABNORMAL

## 2025-01-08 PROCEDURE — 84450 TRANSFERASE (AST) (SGOT): CPT

## 2025-01-08 PROCEDURE — 87591 N.GONORRHOEAE DNA AMP PROB: CPT

## 2025-01-08 PROCEDURE — 85027 COMPLETE CBC AUTOMATED: CPT

## 2025-01-08 PROCEDURE — 81003 URINALYSIS AUTO W/O SCOPE: CPT

## 2025-01-08 PROCEDURE — G0463 HOSPITAL OUTPT CLINIC VISIT: HCPCS | Mod: 25

## 2025-01-08 PROCEDURE — 84156 ASSAY OF PROTEIN URINE: CPT

## 2025-01-08 PROCEDURE — 84460 ALANINE AMINO (ALT) (SGPT): CPT

## 2025-01-08 PROCEDURE — 87210 SMEAR WET MOUNT SALINE/INK: CPT

## 2025-01-08 PROCEDURE — 82565 ASSAY OF CREATININE: CPT

## 2025-01-08 PROCEDURE — 36415 COLL VENOUS BLD VENIPUNCTURE: CPT

## 2025-01-08 PROCEDURE — 87491 CHLMYD TRACH DNA AMP PROBE: CPT

## 2025-01-08 PROCEDURE — 250N000013 HC RX MED GY IP 250 OP 250 PS 637

## 2025-01-08 PROCEDURE — 85041 AUTOMATED RBC COUNT: CPT

## 2025-01-08 RX ORDER — ACETAMINOPHEN 325 MG/1
975 TABLET ORAL EVERY 6 HOURS PRN
Status: DISCONTINUED | OUTPATIENT
Start: 2025-01-08 | End: 2025-01-09 | Stop reason: HOSPADM

## 2025-01-08 RX ORDER — HYDROXYZINE PAMOATE 50 MG/1
50 CAPSULE ORAL 3 TIMES DAILY PRN
Qty: 30 CAPSULE | Refills: 0 | Status: ON HOLD | OUTPATIENT
Start: 2025-01-08

## 2025-01-08 RX ORDER — HYDROXYZINE HYDROCHLORIDE 50 MG/1
100 TABLET, FILM COATED ORAL ONCE
Status: COMPLETED | OUTPATIENT
Start: 2025-01-08 | End: 2025-01-08

## 2025-01-08 RX ORDER — CYCLOBENZAPRINE HCL 5 MG
5-10 TABLET ORAL 3 TIMES DAILY
Status: DISCONTINUED | OUTPATIENT
Start: 2025-01-08 | End: 2025-01-09 | Stop reason: HOSPADM

## 2025-01-08 RX ORDER — CALCIUM CARBONATE 500 MG/1
500 TABLET, CHEWABLE ORAL DAILY PRN
Status: DISCONTINUED | OUTPATIENT
Start: 2025-01-08 | End: 2025-01-09 | Stop reason: HOSPADM

## 2025-01-08 RX ORDER — LIDOCAINE 40 MG/G
CREAM TOPICAL
Status: DISCONTINUED | OUTPATIENT
Start: 2025-01-08 | End: 2025-01-09 | Stop reason: HOSPADM

## 2025-01-08 RX ORDER — MAGNESIUM HYDROXIDE/ALUMINUM HYDROXICE/SIMETHICONE 120; 1200; 1200 MG/30ML; MG/30ML; MG/30ML
15 SUSPENSION ORAL ONCE
Status: COMPLETED | OUTPATIENT
Start: 2025-01-08 | End: 2025-01-08

## 2025-01-08 RX ADMIN — ACETAMINOPHEN 975 MG: 325 TABLET, FILM COATED ORAL at 21:52

## 2025-01-08 RX ADMIN — CALCIUM CARBONATE (ANTACID) CHEW TAB 500 MG 500 MG: 500 CHEW TAB at 20:57

## 2025-01-08 RX ADMIN — CYCLOBENZAPRINE HYDROCHLORIDE 10 MG: 5 TABLET, FILM COATED ORAL at 21:38

## 2025-01-08 RX ADMIN — HYDROXYZINE HYDROCHLORIDE 100 MG: 50 TABLET ORAL at 22:48

## 2025-01-08 RX ADMIN — ALUMINUM HYDROXIDE, MAGNESIUM HYDROXIDE, AND SIMETHICONE 15 ML: 200; 200; 20 SUSPENSION ORAL at 21:52

## 2025-01-08 ASSESSMENT — ACTIVITIES OF DAILY LIVING (ADL)
ADLS_ACUITY_SCORE: 15
ADLS_ACUITY_SCORE: 15
ADLS_ACUITY_SCORE: 41

## 2025-01-09 ENCOUNTER — PRENATAL OFFICE VISIT (OUTPATIENT)
Dept: OBGYN | Facility: CLINIC | Age: 25
End: 2025-01-09
Payer: COMMERCIAL

## 2025-01-09 VITALS
BODY MASS INDEX: 40.41 KG/M2 | HEART RATE: 114 BPM | WEIGHT: 235.4 LBS | DIASTOLIC BLOOD PRESSURE: 78 MMHG | SYSTOLIC BLOOD PRESSURE: 125 MMHG | OXYGEN SATURATION: 97 %

## 2025-01-09 DIAGNOSIS — Z34.03 ENCOUNTER FOR SUPERVISION OF NORMAL FIRST PREGNANCY IN THIRD TRIMESTER: Primary | ICD-10-CM

## 2025-01-09 LAB
C TRACH DNA SPEC QL PROBE+SIG AMP: NEGATIVE
N GONORRHOEA DNA SPEC QL NAA+PROBE: NEGATIVE
SPECIMEN TYPE: NORMAL

## 2025-01-09 NOTE — PROGRESS NOTES
"L&D Triage Progress Note     HPI: Phoebe Morris is a 24 year old  at 38w0d by LMP c/w 10w 4d US, here for right upper quadrant abdominal pain, contractions, and possible leaking of fluid. This pain began on  and is similar to pain she had at around 20 weeks. Really less on her abdomen and more on her side/ribcage. Achey, mild. Previous pain was attributed to round ligament pain. She endorses belly pain to the touch that began today. She endorses contractions that began today and are occurring every 15-30 minutes. In addition, she endorses some leaking of fluid and finds it hard to distinguish if this is actually amniotic fluid or urine. This began a week ago. She had one mild range BP for the first time this pregnancy last week, but has had normal BPs at home. At her last OB appointment she was 1cm dilated and baby is in the 84th percentile for weight. She is scheduled for an induction on 1/15. This pregnancy has been uncomplicated.    She states that she is feeling well today. + FM.  She denies fever, chills, HA, scotoma, CP, SOB, nausea, vomiting, constipation, diarrhea, dysuria, VB, and acute swelling.    Lab Results   Component Value Date    AS Negative 2024    HEPBANG Nonreactive 2024    CHPCRT Negative 2023    GCPCRT Negative 2023    HGB 13.6 2025       GBS Status:   No results found for: \"GBS\"          OBHX:  OB History    Para Term  AB Living   1 0 0 0 0 0   SAB IAB Ectopic Multiple Live Births   0 0 0 0 0      # Outcome Date GA Lbr Napoleon/2nd Weight Sex Type Anes PTL Lv   1 Current                MedicalHX:  Past Medical History:   Diagnosis Date    Obesity        SurgicalHX:  Past Surgical History:   Procedure Laterality Date    NO HISTORY OF SURGERY  2020       Medications:  Current Facility-Administered Medications   Medication Dose Route Frequency Provider Last Rate Last Admin    lidocaine (LMX4) cream   Topical Q1H PRN Paty Hooper MD  "       lidocaine 1 % 0.1-1 mL  0.1-1 mL Other Q1H PRN Paty Hooper MD        sodium chloride (PF) 0.9% PF flush 3 mL  3 mL Intracatheter Q8H Paty Hooper MD        sodium chloride (PF) 0.9% PF flush 3 mL  3 mL Intracatheter q1 min prn Paty Hooper MD           Allergies:  No Known Allergies    FamilyHX:      Family History   Problem Relation Age of Onset    No Known Problems Mother     Diabetes Father         Type 2    No Known Problems Maternal Grandmother     Diabetes Maternal Grandfather     Myocardial Infarction Paternal Grandmother         Heart attack    Hypertension Paternal Grandmother     No Known Problems Brother     No Known Problems Brother        SocialHX:  Social History     Socioeconomic History    Marital status: Single     Spouse name: None    Number of children: None    Years of education: None    Highest education level: None   Tobacco Use    Smoking status: Never     Passive exposure: Never    Smokeless tobacco: Never   Vaping Use    Vaping status: Never Used   Substance and Sexual Activity    Alcohol use: Not Currently     Comment: 1-2x every couple weeks - on social occasions    Drug use: No    Sexual activity: Yes     Partners: Male   Other Topics Concern    Parent/sibling w/ CABG, MI or angioplasty before 65F 55M? No     Social Drivers of Health     Financial Resource Strain: Low Risk  (1/8/2025)    Financial Resource Strain     Within the past 12 months, have you or your family members you live with been unable to get utilities (heat, electricity) when it was really needed?: No   Food Insecurity: Low Risk  (1/8/2025)    Food Insecurity     Within the past 12 months, did you worry that your food would run out before you got money to buy more?: No     Within the past 12 months, did the food you bought just not last and you didn t have money to get more?: No   Transportation Needs: Low Risk  (1/8/2025)    Transportation Needs     Within the past 12 months, has lack of  "transportation kept you from medical appointments, getting your medicines, non-medical meetings or appointments, work, or from getting things that you need?: No   Interpersonal Safety: Low Risk  (1/8/2025)    Interpersonal Safety     Do you feel physically and emotionally safe where you currently live?: Yes     Within the past 12 months, have you been hit, slapped, kicked or otherwise physically hurt by someone?: No     Within the past 12 months, have you been humiliated or emotionally abused in other ways by your partner or ex-partner?: No   Recent Concern: Interpersonal Safety - High Risk (12/10/2024)    Interpersonal Safety     Do you feel physically and emotionally safe where you currently live?: No     Within the past 12 months, have you been hit, slapped, kicked or otherwise physically hurt by someone?: No     Within the past 12 months, have you been humiliated or emotionally abused in other ways by your partner or ex-partner?: No   Housing Stability: Low Risk  (1/8/2025)    Housing Stability     Do you have housing? : Yes     Are you worried about losing your housing?: No       ROS: 10-point ROS negative except as in HPI.    Physical Exam:  Vitals:    01/08/25 2002 01/08/25 2006   BP: 117/76    Resp:  24   Temp:  98.1  F (36.7  C)   TempSrc:  Oral   Weight:  106.1 kg (234 lb)   Height:  1.626 m (5' 4\")     GEN: resting comfortably in bed, NAD   CV: Regular rate, well perfused  PULM: On room air, no increased work of breathing  ABD: soft, gravid, non-tender, non-distended  EXT: no edema, non tender to palpation    SSE: normal appearing external genitalia. Cervix visually closed. Small amount of clumped white discharge around the os. No pooling, negative cough test.   SVE: 1/L/H       NST:  FHT: baseline 140, moderate variability, +accels, no decels, reactive NST   TOCO: quiet ctx in ten minutes  Results for orders placed or performed during the hospital encounter of 01/08/25 (from the past 48 hours)   Protein  " random urine   Result Value Ref Range    Total Protein Urine mg/dL 15.3   mg/dL    Total Protein Urine mg/mg Creat 0.15 0.00 - 0.20 mg/mg Cr    Creatinine Urine mg/dL 103.2 mg/dL   UA Macroscopic with reflex to Microscopic and Culture    Specimen: Urine, Clean Catch   Result Value Ref Range    Color Urine Light Yellow Colorless, Straw, Light Yellow, Yellow    Appearance Urine Clear Clear    Glucose Urine 500 (A) Negative mg/dL    Bilirubin Urine Negative Negative    Ketones Urine Negative Negative mg/dL    Specific Gravity Urine 1.015 1.003 - 1.035    Blood Urine Negative Negative    pH Urine 6.0 5.0 - 7.0    Protein Albumin Urine Negative Negative mg/dL    Urobilinogen Urine Normal Normal, 2.0 mg/dL    Nitrite Urine Negative Negative    Leukocyte Esterase Urine Negative Negative    Narrative    Microscopic not indicated   AST   Result Value Ref Range    AST 8 0 - 45 U/L   ALT   Result Value Ref Range    ALT 6 0 - 50 U/L   CBC with platelets   Result Value Ref Range    WBC Count 12.5 (H) 4.0 - 11.0 10e3/uL    RBC Count 4.34 3.80 - 5.20 10e6/uL    Hemoglobin 12.7 11.7 - 15.7 g/dL    Hematocrit 36.6 35.0 - 47.0 %    MCV 84 78 - 100 fL    MCH 29.3 26.5 - 33.0 pg    MCHC 34.7 31.5 - 36.5 g/dL    RDW 12.4 10.0 - 15.0 %    Platelet Count 235 150 - 450 10e3/uL   Creatinine   Result Value Ref Range    Creatinine 0.57 0.51 - 0.95 mg/dL    GFR Estimate >90 >60 mL/min/1.73m2   Wet preparation    Specimen: Vagina; Swab   Result Value Ref Range    Trichomonas Absent Absent    Yeast Absent Absent    Clue Cells Absent Absent    WBCs/high power field 3+ (A) None   Fern Test for Rupture of Membranes   Result Value Ref Range    Fern Crystallization No ferning present No ferning present        A/P: 24 year old  at 38w0d by LMP c/w 10w 4d US, here for right upper quadrant abdominal pain, contractions, and possible LO. Differential includes labor, Pre E, or GI pain.     # RUQ/rib pain  # Rule out PreE  C/f preE given right upper  quadrant pain, but low suspicion given no findings of headache, vision changes, or consistently elevated blood pressure. HELP labs all within normal range. Suspect MSK pain; did somewhat improve with Flexeril, Tylenol, and GI cocktail.  - Rx for Flexeril sent to pharmacy   - Return precautions reviewed  - Next PN appointment      # Rule out rupture  - Exam as above, low concern for rupture with negative pooling and ferning   - Wet prep negative, GC/CT pending, will follow up results        # Fetal Well-Being  Reactive and reassuring  - FHT: Category I    # PNC  - Rh -, GBS -  - GCT: Normal      Dispo: Discharge to home on     Discussed with Dr. Hooper.     Anne Nagy-Mehul, MS3    I was present with the student who participated in the service and in the documentation of the note. I have verified the history and personally performed the physical exam and medical decision-making. I agree with the assessment and plan of care as documented in the note.     Crystal Reyna DO, PGY-2  [facility]       Physician Attestation   I, Paty Hooper, saw and examined this patient with the resident and agree with the resident's findings and plan of care as documented in the note.      I personally reviewed vital signs, labs and fetal heart tones and toco.    Key findings: patient is  at 38w0d presenting with abdominal pain. Her vitals and labs are normal.  Fetal status is reassuring with reactive NST,.  Minimal ctx's noted on toco.  Patient reassured,  seems just increasing discomfort in the last couple weeks of pregnancy.  Baby is ~ 8.5 lbs and she is not a large person.  She has a planned IOL in one week. Patient encouraged and reassured.     Paty Hooper MD  Date of Service (when I saw the patient): 25

## 2025-01-09 NOTE — PROGRESS NOTES
Return OB visit    Subjective:  Patient reports active fetal movement, no vaginal bleeding or leaking fluid. She denies contractions. She has no concerns today. She was in L&D yesterday with RUQ pain but pre-e eval was normal with negative HELLP labs. She reports that this has been consistent but she was able to rest and sleep last night, feels that it is likely baby stuck up under her ribs. No other pre-e symptoms.        Objective:  /78 (BP Location: Left arm, Patient Position: Sitting, Cuff Size: Adult Large)   Pulse 114   Wt 106.8 kg (235 lb 6.4 oz)   LMP 2024   SpO2 97%   BMI 40.41 kg/m     See OB flow sheet    Assessment and Plan    Phoebe Morris is a 24 year old  at 38w1d here for ALMA visit, pregnancy uncomplicated   -Patient scheduled for eIOL at 39 weeks, discussed IOL process and timeline. Cervix was 1 cm yesterday so will likely plan for richardson balloon with misoprostol for IOL unless cervix is more favorable when she is admitted.   -Patient aware of pre-e signs (had one isolated mild range BP at 37 weeks) but asymptomatic currently except mild RUQ pain, has been monitoring BP at home and in normal range.     RTC to clinic for PP care    Kailee Benz MD

## 2025-01-09 NOTE — PLAN OF CARE
Goal Outcome Evaluation:  Data: [facility] for abdominal [facility]. Cervix 1 cm dilated . Membranes intact. Contractions are not present. See flowsheets for fetal assessment documentation. Labs taken.   Action: Presumed adequate fetal oxygenation documented. Discharge instructions reviewed. Patient instructed to report change in fetal movement, vaginal leaking of fluid or bleeding, abdominal pain, or any concerns related to the pregnancy to provider/clinic.    Response: Orders to discharge home per Dr Hooper. Patient verbalized understanding of education and agreement with plan. Discharged to home at 2255 ambulating with partner.

## 2025-01-09 NOTE — TELEPHONE ENCOUNTER
"OB Triage Call    Is patient's OB/Midwife with the [facility] or [facility]? [facility]- Proceed with triage     Reason for call: Moderate upper abdominal pain with worsening pain when touching the abdomen.     Assessment: Patient calling reporting having abdominal pain over the past few days with worsening pain today. Reports contractions for the past few days with contractions every 10-15 minutes.     Plan: Go to L&D    Patient plans to deliver at [facility]    Patient's primary OB Provider is Dr. Benz.      Per protocol recommendations Patient to be evaluated in L&D. Patient's primary OB is Emelina Physician.  Labor and delivery at [facility] ([phone]) notified of patient's pending arrival.  Report given to Sundar.       Is patient's delivering hospital on divert? No      38w0d    Estimated Date of Delivery: 2025        OB History    Para Term  AB Living   1 0 0 0 0 0   SAB IAB Ectopic Multiple Live Births   0 0 0 0 0      # Outcome Date GA Lbr Napoleon/2nd Weight Sex Type Anes PTL Lv   1 Current                No results found for: \"GBS\"       Cat Agee RN   Reason for Disposition   MODERATE-SEVERE abdominal pain (e.g., interferes with normal activities, awakens from sleep)    Additional Information   Negative: Passed out (i.e., lost consciousness, collapsed and was not responding)   Negative: Shock suspected (e.g., cold/pale/clammy skin, too weak to stand, low BP, rapid pulse)   Negative: Difficult to awaken or acting confused (e.g., disoriented, slurred speech)   Negative: [1] SEVERE abdominal pain (e.g., excruciating) AND [2] constant AND [3] present > 1 hour   Negative: SEVERE vaginal bleeding (e.g., continuous red blood from vagina, large blood clots)   Negative: Sounds like a life-threatening emergency to the triager   Negative: Followed an abdomen (stomach) injury   Negative: [1] Having contractions or other symptoms of labor (such " "as vaginal pressure) AND [2] 37 or more weeks pregnant (i.e., term pregnancy)   Negative: [1] Having contractions or other symptoms of labor (such as vaginal pressure) AND [2] < 37 weeks pregnant (i.e., )   Negative: [1] Abdominal pain AND [2] pregnant < 20 weeks   Negative: [1] Vomiting AND [2] contains red blood or black (\"coffee ground\") material  (Exception: Few red streaks in vomit that only happened once.)   Negative: [1] SEVERE headache AND [2] not relieved with acetaminophen (e.g., Tylenol)    Protocols used: Pregnancy - Abdominal Pain Greater Than 20 Weeks EGA-A-AH    "

## 2025-01-09 NOTE — DISCHARGE INSTRUCTIONS
Learning About When to Call Your Doctor During Pregnancy (After 20 Weeks)  Overview  It's common to have concerns about what might be a problem when you're pregnant. Most pregnancies don't have any serious problems. But it's still important to know when to call your doctor if you have certain symptoms or signs of labor.  These are general suggestions. Your doctor may give you some more information about when to call.  When to call your doctor (after 20 weeks)  Call 911  anytime you think you may need emergency care. For example, call if:  You have severe vaginal bleeding. This means you are soaking through a pad each hour for 2 or more hours.  You have sudden, severe pain in your belly.  You have chest pain, are short of breath, or cough up blood.  You passed out (lost consciousness).  You have a seizure.  You see or feel the umbilical cord.  You think you are about to deliver your baby and can't make it safely to the hospital or birthing center.  Call your doctor now or seek immediate medical care if:  You have vaginal bleeding.  You have belly pain.  You have a fever.  You are dizzy or lightheaded, or you feel like you may faint.  You have signs of a blood clot in your leg (called a deep vein thrombosis), such as:  Pain in the calf, back of the knee, thigh, or groin.  Swelling in your leg or groin.  A color change on the leg or groin. The skin may be reddish or purplish, depending on your usual skin color.  You have symptoms of preeclampsia, such as:  Sudden swelling of your face, hands, or feet.  New vision problems (such as dimness, blurring, or seeing spots).  A severe headache.  You have a sudden release of fluid from your vagina. (You think your water broke.)  You've been having regular contractions for an hour. This means that you've had at least 6 contractions within 1 hour, even after you change your position and drink fluids.  You notice that your baby has stopped moving or is moving less than  "normal.  You have signs of heart failure, such as:  New or increased shortness of breath.  New or worse swelling in your legs, ankles, or feet.  Sudden weight gain, such as more than 2 to 3 pounds in a day or 5 pounds in a week.  Feeling so tired or weak that you cannot do your usual activities.  You have symptoms of a urinary tract infection. These may include:  Pain or burning when you urinate.  A frequent need to urinate without being able to pass much urine.  Pain in the flank, which is just below the rib cage and above the waist on either side of the back.  Blood in your urine.  Watch closely for changes in your health, and be sure to contact your doctor if:  You have vaginal discharge that smells bad.  You feel sad, anxious, or hopeless for more than a few days.  You have skin changes, such as a rash, itching, or a yellow color to your skin.  You have other concerns about your pregnancy.  If you have labor signs at 37 weeks or more  If you have signs of labor at 37 weeks or more, your doctor may tell you to call when your labor becomes more active. Symptoms of active labor include:  Contractions that are regular.  Contractions that are less than 5 minutes apart.  Contractions that are hard to talk through.  Follow-up care is a key part of your treatment and safety. Be sure to make and go to all appointments, and call your doctor if you are having problems. It's also a good idea to know your test results and keep a list of the medicines you take.  Where can you learn more?  Go to https://www.Netskope.net/patiented  Enter N531 in the search box to learn more about \"Learning About When to Call Your Doctor During Pregnancy (After 20 Weeks).\"  Current as of: April 30, 2024  Content Version: 14.3    2024 [employer] [facility] [employer].   Care instructions adapted under license by your healthcare professional. If you have questions about a medical condition or this instruction, always ask your healthcare professional. " [place], [facility] disclaims any warranty or liability for your use of this information.

## 2025-01-14 ENCOUNTER — NURSE TRIAGE (OUTPATIENT)
Dept: NURSING | Facility: CLINIC | Age: 25
End: 2025-01-14
Payer: COMMERCIAL

## 2025-01-15 ENCOUNTER — HOSPITAL ENCOUNTER (INPATIENT)
Facility: CLINIC | Age: 25
End: 2025-01-15
Attending: OBSTETRICS & GYNECOLOGY | Admitting: OBSTETRICS & GYNECOLOGY
Payer: COMMERCIAL

## 2025-01-15 ENCOUNTER — HOSPITAL ENCOUNTER (OUTPATIENT)
Facility: CLINIC | Age: 25
Discharge: HOME | End: 2025-01-15
Attending: OBSTETRICS & GYNECOLOGY | Admitting: OBSTETRICS & GYNECOLOGY
Payer: COMMERCIAL

## 2025-01-15 ENCOUNTER — NURSE TRIAGE (OUTPATIENT)
Dept: NURSING | Facility: CLINIC | Age: 25
End: 2025-01-15

## 2025-01-15 VITALS
DIASTOLIC BLOOD PRESSURE: 75 MMHG | SYSTOLIC BLOOD PRESSURE: 128 MMHG | RESPIRATION RATE: 17 BRPM | WEIGHT: 235.45 LBS | BODY MASS INDEX: 40.42 KG/M2 | TEMPERATURE: 98.9 F

## 2025-01-15 DIAGNOSIS — O26.899 RIGHT UPPER QUADRANT ABDOMINAL PAIN AFFECTING PREGNANCY: ICD-10-CM

## 2025-01-15 DIAGNOSIS — O13.9 GESTATIONAL HYPERTENSION, ANTEPARTUM: ICD-10-CM

## 2025-01-15 DIAGNOSIS — R10.11 RIGHT UPPER QUADRANT ABDOMINAL PAIN AFFECTING PREGNANCY: ICD-10-CM

## 2025-01-15 DIAGNOSIS — O13.9 GESTATIONAL HYPERTENSION, UNSPECIFIED TRIMESTER: ICD-10-CM

## 2025-01-15 DIAGNOSIS — Z34.90 ENCOUNTER FOR INDUCTION OF LABOR: ICD-10-CM

## 2025-01-15 DIAGNOSIS — Z98.891 STATUS POST CESAREAN SECTION: ICD-10-CM

## 2025-01-15 DIAGNOSIS — F41.9 ANXIETY: ICD-10-CM

## 2025-01-15 DIAGNOSIS — Z98.891 S/P CESAREAN SECTION: Primary | ICD-10-CM

## 2025-01-15 LAB
ABO + RH BLD: NORMAL
ALBUMIN UR-MCNC: 10 MG/DL
APPEARANCE UR: CLEAR
BILIRUB UR QL STRIP: NEGATIVE
BLD GP AB SCN SERPL QL: NEGATIVE
COLOR UR AUTO: ABNORMAL
ERYTHROCYTE [DISTWIDTH] IN BLOOD BY AUTOMATED COUNT: 12.7 % (ref 10–15)
FLUAV RNA SPEC QL NAA+PROBE: NEGATIVE
FLUBV RNA RESP QL NAA+PROBE: NEGATIVE
GLUCOSE UR STRIP-MCNC: NEGATIVE MG/DL
HCT VFR BLD AUTO: 35.4 % (ref 35–47)
HGB BLD-MCNC: 12 G/DL (ref 11.7–15.7)
HGB BLD-MCNC: 13.1 G/DL (ref 11.7–15.7)
HGB UR QL STRIP: ABNORMAL
KETONES UR STRIP-MCNC: NEGATIVE MG/DL
LACTATE SERPL-SCNC: 2.3 MMOL/L (ref 0.7–2)
LEUKOCYTE ESTERASE UR QL STRIP: NEGATIVE
MCH RBC QN AUTO: 28 PG (ref 26.5–33)
MCHC RBC AUTO-ENTMCNC: 33.9 G/DL (ref 31.5–36.5)
MCV RBC AUTO: 83 FL (ref 78–100)
NITRATE UR QL: NEGATIVE
PH UR STRIP: 7 [PH] (ref 5–7)
PLATELET # BLD AUTO: 207 10E3/UL (ref 150–450)
RBC # BLD AUTO: 4.28 10E6/UL (ref 3.8–5.2)
RSV RNA SPEC NAA+PROBE: NEGATIVE
SARS-COV-2 RNA RESP QL NAA+PROBE: NEGATIVE
SP GR UR STRIP: 1.01 (ref 1–1.03)
SPECIMEN EXP DATE BLD: NORMAL
UROBILINOGEN UR STRIP-MCNC: NORMAL MG/DL
WBC # BLD AUTO: 17.2 10E3/UL (ref 4–11)

## 2025-01-15 PROCEDURE — 250N000013 HC RX MED GY IP 250 OP 250 PS 637

## 2025-01-15 PROCEDURE — 86780 TREPONEMA PALLIDUM: CPT

## 2025-01-15 PROCEDURE — 271N000001 HC OR GENERAL SUPPLY NON-STERILE: Performed by: OBSTETRICS & GYNECOLOGY

## 2025-01-15 PROCEDURE — 250N000011 HC RX IP 250 OP 636

## 2025-01-15 PROCEDURE — 250N000013 HC RX MED GY IP 250 OP 250 PS 637: Performed by: OBSTETRICS & GYNECOLOGY

## 2025-01-15 PROCEDURE — 710N000009 HC RECOVERY PHASE 1, LEVEL 1, PER MIN: Performed by: OBSTETRICS & GYNECOLOGY

## 2025-01-15 PROCEDURE — 272N000001 HC OR GENERAL SUPPLY STERILE: Performed by: OBSTETRICS & GYNECOLOGY

## 2025-01-15 PROCEDURE — 120N000002 HC R&B MED SURG/OB UMMC

## 2025-01-15 PROCEDURE — 370N000017 HC ANESTHESIA TECHNICAL FEE, PER MIN: Performed by: OBSTETRICS & GYNECOLOGY

## 2025-01-15 PROCEDURE — G0463 HOSPITAL OUTPT CLINIC VISIT: HCPCS

## 2025-01-15 PROCEDURE — 250N000011 HC RX IP 250 OP 636: Performed by: OBSTETRICS & GYNECOLOGY

## 2025-01-15 PROCEDURE — 86900 BLOOD TYPING SEROLOGIC ABO: CPT

## 2025-01-15 PROCEDURE — 360N000076 HC SURGERY LEVEL 3, PER MIN: Performed by: OBSTETRICS & GYNECOLOGY

## 2025-01-15 PROCEDURE — 83605 ASSAY OF LACTIC ACID: CPT | Performed by: OBSTETRICS & GYNECOLOGY

## 2025-01-15 PROCEDURE — 3E0R3BZ INTRODUCTION OF ANESTHETIC AGENT INTO SPINAL CANAL, PERCUTANEOUS APPROACH: ICD-10-PCS | Performed by: ANESTHESIOLOGY

## 2025-01-15 PROCEDURE — 258N000003 HC RX IP 258 OP 636: Performed by: STUDENT IN AN ORGANIZED HEALTH CARE EDUCATION/TRAINING PROGRAM

## 2025-01-15 PROCEDURE — 36415 COLL VENOUS BLD VENIPUNCTURE: CPT | Performed by: OBSTETRICS & GYNECOLOGY

## 2025-01-15 PROCEDURE — 87637 SARSCOV2&INF A&B&RSV AMP PRB: CPT | Performed by: OBSTETRICS & GYNECOLOGY

## 2025-01-15 PROCEDURE — 59200 INSERT CERVICAL DILATOR: CPT

## 2025-01-15 PROCEDURE — 86901 BLOOD TYPING SEROLOGIC RH(D): CPT

## 2025-01-15 PROCEDURE — 85014 HEMATOCRIT: CPT | Performed by: OBSTETRICS & GYNECOLOGY

## 2025-01-15 PROCEDURE — 00HU33Z INSERTION OF INFUSION DEVICE INTO SPINAL CANAL, PERCUTANEOUS APPROACH: ICD-10-PCS | Performed by: ANESTHESIOLOGY

## 2025-01-15 PROCEDURE — 258N000003 HC RX IP 258 OP 636

## 2025-01-15 PROCEDURE — 85018 HEMOGLOBIN: CPT

## 2025-01-15 PROCEDURE — 81003 URINALYSIS AUTO W/O SCOPE: CPT | Performed by: OBSTETRICS & GYNECOLOGY

## 2025-01-15 PROCEDURE — 85041 AUTOMATED RBC COUNT: CPT | Performed by: OBSTETRICS & GYNECOLOGY

## 2025-01-15 PROCEDURE — 250N000011 HC RX IP 250 OP 636: Performed by: STUDENT IN AN ORGANIZED HEALTH CARE EDUCATION/TRAINING PROGRAM

## 2025-01-15 RX ORDER — ONDANSETRON 4 MG/1
4 TABLET, ORALLY DISINTEGRATING ORAL EVERY 6 HOURS PRN
Status: DISCONTINUED | OUTPATIENT
Start: 2025-01-15 | End: 2025-01-16 | Stop reason: HOSPADM

## 2025-01-15 RX ORDER — AMPICILLIN 2 G/1
2 INJECTION, POWDER, FOR SOLUTION INTRAVENOUS EVERY 6 HOURS
Status: DISCONTINUED | OUTPATIENT
Start: 2025-01-15 | End: 2025-01-16 | Stop reason: HOSPADM

## 2025-01-15 RX ORDER — HYDROXYZINE HYDROCHLORIDE 50 MG/1
50 TABLET, FILM COATED ORAL EVERY 6 HOURS PRN
Status: DISCONTINUED | OUTPATIENT
Start: 2025-01-15 | End: 2025-01-15

## 2025-01-15 RX ORDER — NALOXONE HYDROCHLORIDE 0.4 MG/ML
0.2 INJECTION, SOLUTION INTRAMUSCULAR; INTRAVENOUS; SUBCUTANEOUS
Status: DISCONTINUED | OUTPATIENT
Start: 2025-01-15 | End: 2025-01-15 | Stop reason: HOSPADM

## 2025-01-15 RX ORDER — MISOPROSTOL 200 UG/1
800 TABLET ORAL
Status: DISCONTINUED | OUTPATIENT
Start: 2025-01-15 | End: 2025-01-16 | Stop reason: HOSPADM

## 2025-01-15 RX ORDER — KETOROLAC TROMETHAMINE 30 MG/ML
30 INJECTION, SOLUTION INTRAMUSCULAR; INTRAVENOUS
Status: DISCONTINUED | OUTPATIENT
Start: 2025-01-15 | End: 2025-01-15 | Stop reason: HOSPADM

## 2025-01-15 RX ORDER — FENTANYL CITRATE 50 UG/ML
100 INJECTION, SOLUTION INTRAMUSCULAR; INTRAVENOUS
Status: DISCONTINUED | OUTPATIENT
Start: 2025-01-15 | End: 2025-01-16

## 2025-01-15 RX ORDER — LIDOCAINE 40 MG/G
CREAM TOPICAL
Status: DISCONTINUED | OUTPATIENT
Start: 2025-01-15 | End: 2025-01-15 | Stop reason: HOSPADM

## 2025-01-15 RX ORDER — LOPERAMIDE HYDROCHLORIDE 2 MG/1
2 CAPSULE ORAL
Status: DISCONTINUED | OUTPATIENT
Start: 2025-01-15 | End: 2025-01-16 | Stop reason: HOSPADM

## 2025-01-15 RX ORDER — FENTANYL CITRATE 50 UG/ML
50 INJECTION, SOLUTION INTRAMUSCULAR; INTRAVENOUS EVERY 30 MIN PRN
Status: DISCONTINUED | OUTPATIENT
Start: 2025-01-15 | End: 2025-01-15 | Stop reason: HOSPADM

## 2025-01-15 RX ORDER — NALOXONE HYDROCHLORIDE 0.4 MG/ML
0.4 INJECTION, SOLUTION INTRAMUSCULAR; INTRAVENOUS; SUBCUTANEOUS
Status: DISCONTINUED | OUTPATIENT
Start: 2025-01-15 | End: 2025-01-16

## 2025-01-15 RX ORDER — FENTANYL CITRATE 50 UG/ML
INJECTION, SOLUTION INTRAMUSCULAR; INTRAVENOUS
Status: COMPLETED
Start: 2025-01-15 | End: 2025-01-15

## 2025-01-15 RX ORDER — ACETAMINOPHEN 325 MG/1
975 TABLET ORAL EVERY 6 HOURS
Status: DISCONTINUED | OUTPATIENT
Start: 2025-01-15 | End: 2025-01-16 | Stop reason: HOSPADM

## 2025-01-15 RX ORDER — METOCLOPRAMIDE HYDROCHLORIDE 5 MG/ML
10 INJECTION INTRAMUSCULAR; INTRAVENOUS EVERY 6 HOURS PRN
Status: DISCONTINUED | OUTPATIENT
Start: 2025-01-15 | End: 2025-01-16 | Stop reason: HOSPADM

## 2025-01-15 RX ORDER — ACETAMINOPHEN 325 MG/1
975 TABLET ORAL ONCE
Status: COMPLETED | OUTPATIENT
Start: 2025-01-15 | End: 2025-01-15

## 2025-01-15 RX ORDER — MISOPROSTOL 200 UG/1
400 TABLET ORAL
Status: DISCONTINUED | OUTPATIENT
Start: 2025-01-15 | End: 2025-01-16 | Stop reason: HOSPADM

## 2025-01-15 RX ORDER — CITRIC ACID/SODIUM CITRATE 334-500MG
30 SOLUTION, ORAL ORAL ONCE
Status: DISCONTINUED | OUTPATIENT
Start: 2025-01-15 | End: 2025-01-15 | Stop reason: HOSPADM

## 2025-01-15 RX ORDER — METOCLOPRAMIDE 10 MG/1
10 TABLET ORAL EVERY 6 HOURS PRN
Status: DISCONTINUED | OUTPATIENT
Start: 2025-01-15 | End: 2025-01-16 | Stop reason: HOSPADM

## 2025-01-15 RX ORDER — OXYTOCIN 10 [USP'U]/ML
10 INJECTION, SOLUTION INTRAMUSCULAR; INTRAVENOUS
Status: DISCONTINUED | OUTPATIENT
Start: 2025-01-15 | End: 2025-01-15 | Stop reason: HOSPADM

## 2025-01-15 RX ORDER — KETOROLAC TROMETHAMINE 30 MG/ML
30 INJECTION, SOLUTION INTRAMUSCULAR; INTRAVENOUS
Status: COMPLETED | OUTPATIENT
Start: 2025-01-15 | End: 2025-01-16

## 2025-01-15 RX ORDER — CARBOPROST TROMETHAMINE 250 UG/ML
250 INJECTION, SOLUTION INTRAMUSCULAR
Status: DISCONTINUED | OUTPATIENT
Start: 2025-01-15 | End: 2025-01-16 | Stop reason: HOSPADM

## 2025-01-15 RX ORDER — MISOPROSTOL 200 UG/1
400 TABLET ORAL
Status: DISCONTINUED | OUTPATIENT
Start: 2025-01-15 | End: 2025-01-15 | Stop reason: HOSPADM

## 2025-01-15 RX ORDER — OXYTOCIN 10 [USP'U]/ML
10 INJECTION, SOLUTION INTRAMUSCULAR; INTRAVENOUS
Status: DISCONTINUED | OUTPATIENT
Start: 2025-01-15 | End: 2025-01-16

## 2025-01-15 RX ORDER — NALBUPHINE HYDROCHLORIDE 10 MG/ML
2.5-5 INJECTION INTRAMUSCULAR; INTRAVENOUS; SUBCUTANEOUS EVERY 6 HOURS PRN
Status: DISCONTINUED | OUTPATIENT
Start: 2025-01-15 | End: 2025-01-16

## 2025-01-15 RX ORDER — ONDANSETRON 4 MG/1
4 TABLET, ORALLY DISINTEGRATING ORAL EVERY 6 HOURS PRN
Status: DISCONTINUED | OUTPATIENT
Start: 2025-01-15 | End: 2025-01-15 | Stop reason: HOSPADM

## 2025-01-15 RX ORDER — ONDANSETRON 2 MG/ML
4 INJECTION INTRAMUSCULAR; INTRAVENOUS EVERY 6 HOURS PRN
Status: DISCONTINUED | OUTPATIENT
Start: 2025-01-15 | End: 2025-01-15 | Stop reason: HOSPADM

## 2025-01-15 RX ORDER — CLINDAMYCIN PHOSPHATE 900 MG/50ML
900 INJECTION, SOLUTION INTRAVENOUS EVERY 8 HOURS
Status: DISCONTINUED | OUTPATIENT
Start: 2025-01-16 | End: 2025-01-16

## 2025-01-15 RX ORDER — NALOXONE HYDROCHLORIDE 0.4 MG/ML
0.2 INJECTION, SOLUTION INTRAMUSCULAR; INTRAVENOUS; SUBCUTANEOUS
Status: DISCONTINUED | OUTPATIENT
Start: 2025-01-15 | End: 2025-01-16

## 2025-01-15 RX ORDER — OXYTOCIN/0.9 % SODIUM CHLORIDE 30/500 ML
100-340 PLASTIC BAG, INJECTION (ML) INTRAVENOUS CONTINUOUS PRN
Status: DISCONTINUED | OUTPATIENT
Start: 2025-01-15 | End: 2025-01-16

## 2025-01-15 RX ORDER — OXYTOCIN/0.9 % SODIUM CHLORIDE 30/500 ML
100-340 PLASTIC BAG, INJECTION (ML) INTRAVENOUS CONTINUOUS PRN
Status: DISCONTINUED | OUTPATIENT
Start: 2025-01-15 | End: 2025-01-15 | Stop reason: HOSPADM

## 2025-01-15 RX ORDER — IBUPROFEN 800 MG/1
800 TABLET, FILM COATED ORAL
Status: COMPLETED | OUTPATIENT
Start: 2025-01-15 | End: 2025-01-16

## 2025-01-15 RX ORDER — NALOXONE HYDROCHLORIDE 0.4 MG/ML
0.4 INJECTION, SOLUTION INTRAMUSCULAR; INTRAVENOUS; SUBCUTANEOUS
Status: DISCONTINUED | OUTPATIENT
Start: 2025-01-15 | End: 2025-01-15 | Stop reason: HOSPADM

## 2025-01-15 RX ORDER — CITRIC ACID/SODIUM CITRATE 334-500MG
30 SOLUTION, ORAL ORAL
Status: COMPLETED | OUTPATIENT
Start: 2025-01-15 | End: 2025-01-15

## 2025-01-15 RX ORDER — IBUPROFEN 800 MG/1
800 TABLET, FILM COATED ORAL
Status: DISCONTINUED | OUTPATIENT
Start: 2025-01-15 | End: 2025-01-15 | Stop reason: HOSPADM

## 2025-01-15 RX ORDER — FAMOTIDINE 10 MG
10 TABLET ORAL 2 TIMES DAILY
Status: DISCONTINUED | OUTPATIENT
Start: 2025-01-15 | End: 2025-01-16

## 2025-01-15 RX ORDER — LOPERAMIDE HYDROCHLORIDE 2 MG/1
4 CAPSULE ORAL
Status: DISCONTINUED | OUTPATIENT
Start: 2025-01-15 | End: 2025-01-16 | Stop reason: HOSPADM

## 2025-01-15 RX ORDER — CITRIC ACID/SODIUM CITRATE 334-500MG
30 SOLUTION, ORAL ORAL
Status: DISCONTINUED | OUTPATIENT
Start: 2025-01-15 | End: 2025-01-15 | Stop reason: HOSPADM

## 2025-01-15 RX ORDER — OXYTOCIN/0.9 % SODIUM CHLORIDE 30/500 ML
340 PLASTIC BAG, INJECTION (ML) INTRAVENOUS CONTINUOUS PRN
Status: DISCONTINUED | OUTPATIENT
Start: 2025-01-15 | End: 2025-01-16 | Stop reason: HOSPADM

## 2025-01-15 RX ORDER — METOCLOPRAMIDE HYDROCHLORIDE 5 MG/ML
10 INJECTION INTRAMUSCULAR; INTRAVENOUS EVERY 6 HOURS PRN
Status: DISCONTINUED | OUTPATIENT
Start: 2025-01-15 | End: 2025-01-15 | Stop reason: HOSPADM

## 2025-01-15 RX ORDER — OXYTOCIN/0.9 % SODIUM CHLORIDE 30/500 ML
340 PLASTIC BAG, INJECTION (ML) INTRAVENOUS CONTINUOUS PRN
Status: DISCONTINUED | OUTPATIENT
Start: 2025-01-15 | End: 2025-01-15 | Stop reason: HOSPADM

## 2025-01-15 RX ORDER — CARBOPROST TROMETHAMINE 250 UG/ML
250 INJECTION, SOLUTION INTRAMUSCULAR
Status: DISCONTINUED | OUTPATIENT
Start: 2025-01-15 | End: 2025-01-15 | Stop reason: HOSPADM

## 2025-01-15 RX ORDER — HYDROXYZINE HYDROCHLORIDE 50 MG/1
50 TABLET, FILM COATED ORAL EVERY 6 HOURS PRN
Status: DISCONTINUED | OUTPATIENT
Start: 2025-01-15 | End: 2025-01-15 | Stop reason: HOSPADM

## 2025-01-15 RX ORDER — METHYLERGONOVINE MALEATE 0.2 MG/ML
200 INJECTION INTRAVENOUS
Status: DISCONTINUED | OUTPATIENT
Start: 2025-01-15 | End: 2025-01-16 | Stop reason: HOSPADM

## 2025-01-15 RX ORDER — FAMOTIDINE 10 MG
20 TABLET ORAL 2 TIMES DAILY
Status: DISCONTINUED | OUTPATIENT
Start: 2025-01-15 | End: 2025-01-15 | Stop reason: HOSPADM

## 2025-01-15 RX ORDER — ONDANSETRON 2 MG/ML
4 INJECTION INTRAMUSCULAR; INTRAVENOUS EVERY 6 HOURS PRN
Status: DISCONTINUED | OUTPATIENT
Start: 2025-01-15 | End: 2025-01-16 | Stop reason: HOSPADM

## 2025-01-15 RX ORDER — METHYLERGONOVINE MALEATE 0.2 MG/ML
200 INJECTION INTRAVENOUS
Status: DISCONTINUED | OUTPATIENT
Start: 2025-01-15 | End: 2025-01-15 | Stop reason: HOSPADM

## 2025-01-15 RX ORDER — LOPERAMIDE HYDROCHLORIDE 2 MG/1
4 CAPSULE ORAL
Status: DISCONTINUED | OUTPATIENT
Start: 2025-01-15 | End: 2025-01-15 | Stop reason: HOSPADM

## 2025-01-15 RX ORDER — LOPERAMIDE HYDROCHLORIDE 2 MG/1
2 CAPSULE ORAL
Status: DISCONTINUED | OUTPATIENT
Start: 2025-01-15 | End: 2025-01-15 | Stop reason: HOSPADM

## 2025-01-15 RX ORDER — PROCHLORPERAZINE MALEATE 10 MG
10 TABLET ORAL EVERY 6 HOURS PRN
Status: DISCONTINUED | OUTPATIENT
Start: 2025-01-15 | End: 2025-01-15 | Stop reason: HOSPADM

## 2025-01-15 RX ORDER — FENTANYL/ROPIVACAINE/NS/PF 2MCG/ML-.1
PLASTIC BAG, INJECTION (ML) EPIDURAL
Status: DISCONTINUED | OUTPATIENT
Start: 2025-01-15 | End: 2025-01-16 | Stop reason: HOSPADM

## 2025-01-15 RX ORDER — MISOPROSTOL 200 UG/1
800 TABLET ORAL
Status: DISCONTINUED | OUTPATIENT
Start: 2025-01-15 | End: 2025-01-15 | Stop reason: HOSPADM

## 2025-01-15 RX ORDER — SODIUM CHLORIDE, SODIUM LACTATE, POTASSIUM CHLORIDE, CALCIUM CHLORIDE 600; 310; 30; 20 MG/100ML; MG/100ML; MG/100ML; MG/100ML
INJECTION, SOLUTION INTRAVENOUS CONTINUOUS PRN
Status: DISCONTINUED | OUTPATIENT
Start: 2025-01-15 | End: 2025-01-16 | Stop reason: HOSPADM

## 2025-01-15 RX ORDER — OXYTOCIN 10 [USP'U]/ML
10 INJECTION, SOLUTION INTRAMUSCULAR; INTRAVENOUS
Status: DISCONTINUED | OUTPATIENT
Start: 2025-01-15 | End: 2025-01-16 | Stop reason: HOSPADM

## 2025-01-15 RX ORDER — ACETAMINOPHEN 325 MG/1
650 TABLET ORAL EVERY 4 HOURS PRN
Status: DISCONTINUED | OUTPATIENT
Start: 2025-01-15 | End: 2025-01-15 | Stop reason: HOSPADM

## 2025-01-15 RX ORDER — LIDOCAINE 40 MG/G
CREAM TOPICAL
Status: DISCONTINUED | OUTPATIENT
Start: 2025-01-15 | End: 2025-01-16 | Stop reason: HOSPADM

## 2025-01-15 RX ORDER — FENTANYL CITRATE 50 UG/ML
50-100 INJECTION, SOLUTION INTRAMUSCULAR; INTRAVENOUS ONCE
Status: COMPLETED | OUTPATIENT
Start: 2025-01-15 | End: 2025-01-15

## 2025-01-15 RX ORDER — METOCLOPRAMIDE 10 MG/1
10 TABLET ORAL EVERY 6 HOURS PRN
Status: DISCONTINUED | OUTPATIENT
Start: 2025-01-15 | End: 2025-01-15 | Stop reason: HOSPADM

## 2025-01-15 RX ORDER — OXYTOCIN/0.9 % SODIUM CHLORIDE 30/500 ML
1-24 PLASTIC BAG, INJECTION (ML) INTRAVENOUS CONTINUOUS
Status: DISCONTINUED | OUTPATIENT
Start: 2025-01-15 | End: 2025-01-16 | Stop reason: HOSPADM

## 2025-01-15 RX ORDER — PROCHLORPERAZINE MALEATE 10 MG
10 TABLET ORAL EVERY 6 HOURS PRN
Status: DISCONTINUED | OUTPATIENT
Start: 2025-01-15 | End: 2025-01-16 | Stop reason: HOSPADM

## 2025-01-15 RX ORDER — TRANEXAMIC ACID 10 MG/ML
1 INJECTION, SOLUTION INTRAVENOUS EVERY 30 MIN PRN
Status: DISCONTINUED | OUTPATIENT
Start: 2025-01-15 | End: 2025-01-16 | Stop reason: HOSPADM

## 2025-01-15 RX ORDER — TRANEXAMIC ACID 10 MG/ML
1 INJECTION, SOLUTION INTRAVENOUS EVERY 30 MIN PRN
Status: DISCONTINUED | OUTPATIENT
Start: 2025-01-15 | End: 2025-01-15 | Stop reason: HOSPADM

## 2025-01-15 RX ORDER — FENTANYL CITRATE-0.9 % NACL/PF 10 MCG/ML
100 PLASTIC BAG, INJECTION (ML) INTRAVENOUS EVERY 5 MIN PRN
Status: DISCONTINUED | OUTPATIENT
Start: 2025-01-15 | End: 2025-01-16 | Stop reason: HOSPADM

## 2025-01-15 RX ADMIN — SODIUM CITRATE AND CITRIC ACID MONOHYDRATE 30 ML: 500; 334 SOLUTION ORAL at 23:56

## 2025-01-15 RX ADMIN — GENTAMICIN SULFATE 220 MG: 40 INJECTION, SOLUTION INTRAMUSCULAR; INTRAVENOUS at 21:54

## 2025-01-15 RX ADMIN — ACETAMINOPHEN 975 MG: 325 TABLET, FILM COATED ORAL at 04:17

## 2025-01-15 RX ADMIN — FENTANYL CITRATE 100 MCG: 50 INJECTION INTRAMUSCULAR; INTRAVENOUS at 17:00

## 2025-01-15 RX ADMIN — SODIUM CHLORIDE, POTASSIUM CHLORIDE, SODIUM LACTATE AND CALCIUM CHLORIDE: 600; 310; 30; 20 INJECTION, SOLUTION INTRAVENOUS at 18:37

## 2025-01-15 RX ADMIN — SODIUM CHLORIDE, POTASSIUM CHLORIDE, SODIUM LACTATE AND CALCIUM CHLORIDE 500 ML: 600; 310; 30; 20 INJECTION, SOLUTION INTRAVENOUS at 17:17

## 2025-01-15 RX ADMIN — Medication 100 MCG: at 18:51

## 2025-01-15 RX ADMIN — AMPICILLIN SODIUM 2 G: 2 INJECTION, POWDER, FOR SOLUTION INTRAMUSCULAR; INTRAVENOUS at 21:30

## 2025-01-15 RX ADMIN — SODIUM CHLORIDE, POTASSIUM CHLORIDE, SODIUM LACTATE AND CALCIUM CHLORIDE: 600; 310; 30; 20 INJECTION, SOLUTION INTRAVENOUS at 21:29

## 2025-01-15 RX ADMIN — Medication: at 18:25

## 2025-01-15 RX ADMIN — FENTANYL CITRATE 100 MCG: 50 INJECTION INTRAMUSCULAR; INTRAVENOUS at 18:18

## 2025-01-15 RX ADMIN — HYDROXYZINE HYDROCHLORIDE 50 MG: 50 TABLET, FILM COATED ORAL at 04:18

## 2025-01-15 RX ADMIN — FAMOTIDINE 10 MG: 10 TABLET ORAL at 19:23

## 2025-01-15 RX ADMIN — SODIUM CHLORIDE, POTASSIUM CHLORIDE, SODIUM LACTATE AND CALCIUM CHLORIDE 500 ML: 600; 310; 30; 20 INJECTION, SOLUTION INTRAVENOUS at 20:58

## 2025-01-15 RX ADMIN — ACETAMINOPHEN 975 MG: 325 TABLET, FILM COATED ORAL at 21:23

## 2025-01-15 ASSESSMENT — ACTIVITIES OF DAILY LIVING (ADL)
ADLS_ACUITY_SCORE: 41
ADLS_ACUITY_SCORE: 15
ADLS_ACUITY_SCORE: 41
ADLS_ACUITY_SCORE: 15
ADLS_ACUITY_SCORE: 41
ADLS_ACUITY_SCORE: 41
ADLS_ACUITY_SCORE: 15
ADLS_ACUITY_SCORE: 41

## 2025-01-15 NOTE — TELEPHONE ENCOUNTER
"OB Triage Call      Is patient's OB/Midwife with the [facility]? [facility]- Proceed with triage     Reason for call: 38w 6d 1st baby, she thinks she is in labor--she states she has already lost part of her mucous plug (brownish mucous).. Now having \"pretty good pressure on her pelvis\" that she did not have earlier today, \"starting to radiate\" into her thighs. She thinks she is having contractions.     Assessment: Cramping every 5-10 minutes--consistent achy=\"6\". Induction scheduled for 7am tomorrow am. The contractions are 7-10 minutes apart for the last 1-1+1/2 hrs.    No leaking of fluid, but constant urge to urinate--sitting on the toilet. Passing urine small amounts at a time, frequently. No pain or burning with urination. No fever noted.     Plan: triaged to a disposition of Home Care: given per guideline.     Patient plans to deliver at [facility]    Patient's primary OB Provider is Dr Latosha Benz (last seen).      Per protocol recommendations Patient to follow home care recommendations.       Is patient's delivering hospital on divert? Does not apply due to Patient given home care instructions    Josefina Son RN Triage Nurse Advisor 7:26 PM 2025      38w6d    Estimated Date of Delivery: 2025        OB History    Para Term  AB Living   1 0 0 0 0 0   SAB IAB Ectopic Multiple Live Births   0 0 0 0 0      # Outcome Date GA Lbr Napoleon/2nd Weight Sex Type Anes PTL Lv   1 Current                No results found for: \"GBS\"           Reason for Disposition   [1] First baby (primipara) AND [2] contractions > 5 minutes apart OR for < 2 hours    Additional Information   Negative: Passed out (i.e., lost consciousness, collapsed and was not responding)   Negative: Shock suspected (e.g., cold/pale/clammy skin, too weak to stand, low BP, rapid pulse)   Negative: Difficult to awaken or acting confused (e.g., disoriented, slurred speech)   Negative: [1] SEVERE " "abdominal pain (e.g., excruciating) AND [2] constant AND [3] present > 1 hour   Negative: SEVERE bleeding (e.g., continuous red blood from vagina, or large blood clots)   Negative: Umbilical cord hanging out of the vagina (shiny, white, curled appearance, \"like telephone cord\")   Negative: Uncontrollable urge to push (i.e., feels like baby is coming out now)   Negative: Can see baby   Negative: Sounds like a life-threatening emergency to the triager   Negative: Pregnant < 37 weeks (i.e., )   Negative: [1] Uncertain delivery date AND [2] possibly pregnant < 37 weeks (i.e., )   Negative: [1] First baby (primipara) AND [2] contractions < 6 minutes apart  AND [3] present 2 hours   Negative: [1] History of prior delivery (multipara) AND [2] contractions < 10 minutes apart AND [3] present 1 hour   Negative: [1] History of rapid prior delivery AND [2] contractions < 10 minutes apart   Negative: [1] Leakage of fluid from vagina AND [2] green or brown in color   Negative: Leakage of fluid from vagina   Negative: Vaginal bleeding or spotting  (Exception: Brief spotting after intercourse or pelvic exam.)   Negative: Baby moving less today (e.g., kick count < 5 in 1 hour or < 10 in 2 hours)   Negative: Severe headache or headache that won't go away   Negative: New blurred vision or vision changes   Negative: MODERATE-SEVERE abdominal pain   Negative: [1] MILD abdominal pain (e.g., doesn't interfere with normal activities) AND [2] constant AND [3] present > 2 hours   Negative: Fever 100.4 F (38.0 C) or higher   Negative: Patient sounds very sick or weak to the triager   Negative: [1] First baby (primipara) AND [2] contractions < 10 minutes apart AND [3] present 4 hours or longer    Protocols used: Pregnancy - Labor-A-AH    "

## 2025-01-15 NOTE — TELEPHONE ENCOUNTER
"OB Triage Call      Is patient's OB/Midwife with the [facility]? [facility]- Proceed with triage     Reason for call: Contractions    Assessment:   Regular contractions since 12:30 am, interval 5 minutes. Duration 1-1.5 minutes.  Passed mucus plug. No vaginal bleeding  No ROM        Plan: L&D    Patient plans to deliver at [facility]    Patient's primary OB Provider is Sandie.      Per protocol recommendations Patient to be evaluated in L&D. Patient's primary OB is Emelina Physician.  Labor and delivery at [facility] ([phone]) notified of patient's pending arrival.  Report given to Latosha DE ANDA.      Is patient's delivering hospital on divert? No      39w0d    Estimated Date of Delivery: 2025        OB History    Para Term  AB Living   1 0 0 0 0 0   SAB IAB Ectopic Multiple Live Births   0 0 0 0 0      # Outcome Date GA Lbr Napoleon/2nd Weight Sex Type Anes PTL Lv   1 Current                No results found for: \"GBS\"       Latoya Rich RN   Reason for Disposition   [1] First baby (primipara) AND [2] contractions < 6 minutes apart  AND [3] present 2 hours    Additional Information   Negative: Passed out (i.e., lost consciousness, collapsed and was not responding)   Negative: Shock suspected (e.g., cold/pale/clammy skin, too weak to stand, low BP, rapid pulse)   Negative: Difficult to awaken or acting confused (e.g., disoriented, slurred speech)   Negative: [1] SEVERE abdominal pain (e.g., excruciating) AND [2] constant AND [3] present > 1 hour   Negative: SEVERE bleeding (e.g., continuous red blood from vagina, or large blood clots)   Negative: Umbilical cord hanging out of the vagina (shiny, white, curled appearance, \"like telephone cord\")   Negative: Uncontrollable urge to push (i.e., feels like baby is coming out now)   Negative: Can see baby   Negative: Sounds like a life-threatening emergency to the triager   Negative: Pregnant " < 37 weeks (i.e., )   Negative: [1] Uncertain delivery date AND [2] possibly pregnant < 37 weeks (i.e., )    Protocols used: Pregnancy - Labor-A-AH

## 2025-01-15 NOTE — PROGRESS NOTES
Data: Patient assessed in the Birthplace for uterine contractions. Cervix 1.5 cm dilated and 50% effaced. Fetal station -3. Membranes intact. Contractions are present. Contactions are present, See flowsheets for fetal assessment documentation.     Action: Presumed adequate fetal oxygenation documented. Discharge instructions reviewed. Patient instructed to report change in fetal movement, vaginal leaking of fluid or bleeding, abdominal pain, or any concerns related to the pregnancy to provider/clinic. Cook Balloon inflated to 80 mL in uterine balloon. Patient monitored after.     Response: Orders to discharge home per Dr. Stephens. Patient verbalized understanding of education and agreement with plan. Discharged to home at 0644.

## 2025-01-15 NOTE — PROCEDURES
Obstetrics Triage Note    HPI:  Phoebe Morris is a 24 year old  at 39w0d by 10w4d US    Patient states that she has been experiencing contractions every 5-10 min at home.  + FM. Denies loss of fluid.  She states that she has otherwise been feeling well. She denies fever, N/V, chest pain, SOB, abdominal pain, vaginal bleeding, vaginal discharge, dysuria, constipation.      ROS:  Negative except as mentioned in HPI.    PMH:  Past Medical History:   Diagnosis Date    Obesity        PSHx:  Past Surgical History:   Procedure Laterality Date    NO HISTORY OF SURGERY  2020       Medications:  Current Facility-Administered Medications   Medication Dose Route Frequency Provider Last Rate Last Admin    famotidine (PEPCID) tablet 20 mg  20 mg Oral BID Maximino Stephens MD        hydrOXYzine HCl (ATARAX) tablet 50 mg  50 mg Oral Q6H PRN Rachel Cain MD           Allergies:   No Known Allergies    Physical Exam:   Vitals:    01/15/25 0316   BP: 128/75   BP Location: Left arm   Patient Position: Semi-Elias's   Cuff Size: Adult Regular   Resp: 17   Temp: 98.9  F (37.2  C)   TempSrc: Oral   Weight: 106.8 kg (235 lb 7.2 oz)      Gen: resting comfortably, in NAD  CV: Regular rate and rhythm,   Pulm: CTAB, no increased work of breathing  Abd: soft, gravid, non-tender, non-distended    SVE: 1.5/50/-3 x 2 (over 1 hour)    NST:  FHT: , mod variability,  accelerations, no decelerations  [place]: Q 5-8 min    Assessment/Plan: Phoebe Morris is a 24 year old  at 39w0d here or latent labor. Not concerned about SROM, cervix unchanged over two checks, still 1.5 cm. Patient is schedule for 39 elective IOL this afternoon. We discussed that due to staffing we could not start the IOL now, however we could offer outpatient mechanical ripening with cook catheter. Patient  agreed to balloon and follow up this afternoon.  -patient tolerated placement of 60cc baloon, given education and return  precautions  -patient plan to call this afternoon for IOL    Maximino Stephens DO, MA  [facility]- PGY3  6:18 AM January 15, 2025     Agree with above note and patient was staffed over the phone.  Rachel Cain MD

## 2025-01-15 NOTE — PROVIDER NOTIFICATION
01/15/25 0619   Provider Notification   Provider Name/Title Dr. Stephens   Method of Notification Electronic Page   Request Evaluate - Remote     Patient put back on EFM after richardson placement

## 2025-01-15 NOTE — PROVIDER NOTIFICATION
01/15/25 0327   Provider Notification   Provider Name/Title Dr. Stephens   Method of Notification Electronic Page   Request Evaluate in Person   Notification Reason Patient Arrived      39.0 scheduled morning elective induction here to [facility] since 30, q 6-7 minutes on monitor, VSS

## 2025-01-15 NOTE — PLAN OF CARE
Patient presents to the birthplace after having a cook catheter inserted this morning to start IOL and then went home. Patient states she is very uncomfortable with the cook catheter in place and can hardly sit down due to pain. Patient brought to room 475. Oriented to room and call light. This RN able to pull cook catheter out with firm traction. Patient states her contractions have decreased in intensity after the catheter came out. She is amendable to pitocin for labor augmentation and would like to receive an epidural shortly after pitocin is started.  with moderate variability. Accels present, decels are not present. Patient endorses a lot of fetal movement. Contractions every 2-5 minutes. Bloody show present. Devny, partner at bedside and is supportive. MD notified of patient arrival.

## 2025-01-15 NOTE — H&P
[facility]  Labor & Delivery History and Physical   January 15, 2025  Phoebe Morris  [id number]      HPI:   Phoebe Morris is a 24 year old  at 39w0d by [facility] c/w 10w US who presents for IOL.     Patient was here this morning with contractions and cervical dilatation of 1.5 cm. She was discharged back home with a cook catheter for cervical ripening. Her contractions have since increased in intensity and frequency. The contractions are painful and occur every couple minutes. Patient denies leakage of fluid,vaginal discharge or bleeding. Endorses fetal movements. She feels nauseous has had significant acid reflux throughout the pregnancy but denies vomiting, fever, chills headache, vision changes, loss of taste/smell, cough, sore throat, chest pain, shortness of breath, RUQ pain, dysuria, constipation, diarrhea, increased edema.     Pregnancy complicated by:  Pregnancy was earlier complicated by placenta previa that resolved.     OBHX:   OB History    Para Term  AB Living   1 0 0 0 0 0   SAB IAB Ectopic Multiple Live Births   0 0 0 0 0      # Outcome Date GA Lbr Napoleon/2nd Weight Sex Type Anes PTL Lv   1 Current                Past Medical History:   Diagnosis Date    Obesity    No hx of bleeding disorders    Past Surgical History:   Procedure Laterality Date    NO HISTORY OF SURGERY  2020       Medications:   Current Facility-Administered Medications   Medication Dose Route Frequency Provider Last Rate Last Admin    carboprost (HEMABATE) injection 250 mcg  250 mcg Intramuscular Q15 Min PRN Beatrice Estrella MD        And    loperamide (IMODIUM) capsule 4 mg  4 mg Oral Once PRN Beatrice Estrella MD        fentaNYL (PF) (SUBLIMAZE) injection  mcg   mcg Intravenous Once Beatrice Estrella MD        ketorolac (TORADOL) injection 30 mg  30 mg Intravenous Once PRN Beatrice Estrella MD        Or    ketorolac (TORADOL) injection 30 mg  30 mg Intramuscular Once PRN Nickan,  MD Beatrice        Or    ibuprofen (ADVIL/MOTRIN) tablet 800 mg  800 mg Oral Once PRN Beatrice Estrella MD        lactated ringers BOLUS 1,000 mL  1,000 mL Intravenous Once PRN Beatrice Estrella MD        Or    lactated ringers BOLUS 500 mL  500 mL Intravenous Once PRN Beatrice Estrella MD        lactated ringers infusion   Intravenous Continuous PRN Beatrice Estrella MD        lidocaine (LMX4) cream   Topical Q1H PRN Beatrice Estrella MD        lidocaine 1 % 0.1-1 mL  0.1-1 mL Other Q1H PRN Beatrice Estrella MD        lidocaine 1 % 0.1-20 mL  0.1-20 mL Subcutaneous Once PRN Beatrice Estrella MD        loperamide (IMODIUM) capsule 2 mg  2 mg Oral Q2H PRN Beatrice Estrella MD        methylergonovine (METHERGINE) injection 200 mcg  200 mcg Intramuscular Q2H PRN Beatrice Estrella MD        metoclopramide (REGLAN) tablet 10 mg  10 mg Oral Q6H PRN Beatrice Estrella MD        Or    metoclopramide (REGLAN) injection 10 mg  10 mg Intravenous Q6H PRN Beatrice Estrella MD        misoprostol (CYTOTEC) tablet 400 mcg  400 mcg Oral ONCE PRN REPEAT PER INSTRUCTIONS Beatirce Estrella MD        Or    misoprostol (CYTOTEC) tablet 800 mcg  800 mcg Rectal ONCE PRN REPEAT PER INSTRUCTIONS Beatrice Estrella MD        ondansetron (ZOFRAN ODT) ODT tab 4 mg  4 mg Oral Q6H PRN Beatrice Estrella MD        Or    ondansetron (ZOFRAN) injection 4 mg  4 mg Intravenous Q6H PRN Beatrice Estrella MD        oxytocin (PITOCIN) 30 units in 500 mL 0.9% NaCl infusion  100-340 mL/hr Intravenous Continuous PRN Beatrice Estrella MD        oxytocin (PITOCIN) 30 units in 500 mL 0.9% NaCl infusion  340 mL/hr Intravenous Continuous PRN Beatrice Estrella MD        oxytocin (PITOCIN) 30 units in 500 mL 0.9% NaCl infusion  1-24 zoran-units/min Intravenous Continuous Beatrice Estrella MD        oxytocin (PITOCIN) injection 10 Units  10 Units Intramuscular Once PRN Beatrice Estrella MD        oxytocin (PITOCIN) injection 10 Units  10 Units Intramuscular Once PRN Beatrice Estrella MD        prochlorperazine (COMPAZINE) tablet 10 mg  10 mg Oral Q6H PRN Beatrice Estrella,  MD        Or    prochlorperazine (COMPAZINE) injection 10 mg  10 mg Intravenous Q6H PRN Beatrice Estrella MD        sodium chloride (PF) 0.9% PF flush 3 mL  3 mL Intracatheter Q8H Beatrice Estrella MD        sodium chloride (PF) 0.9% PF flush 3 mL  3 mL Intracatheter q1 min prn Beatrice Estrella MD        sodium citrate-citric acid (BICITRA) solution 30 mL  30 mL Oral Once PRN Beatrice Estrella MD        tranexamic acid 1 g in 100 mL NS IV bag (premix)  1 g Intravenous Q30 Min PRN Beatrice Estrella MD           No Known Allergies    Family History   Problem Relation Age of Onset    No Known Problems Mother     Diabetes Father         Type 2    No Known Problems Maternal Grandmother     Diabetes Maternal Grandfather     Myocardial Infarction Paternal Grandmother         Heart attack    Hypertension Paternal Grandmother     No Known Problems Brother     No Known Problems Brother        SocialHX:   Social History     Tobacco Use    Smoking status: Never     Passive exposure: Never    Smokeless tobacco: Never   Vaping Use    Vaping status: Never Used   Substance Use Topics    Alcohol use: Not Currently     Comment: 1-2x every couple weeks - on social occasions    Drug use: No       ROS: 10-point ROS negative except as indicated in HPI.    Physical Exam:  Vitals:    01/15/25 1537   BP: 123/84   Pulse: (!) 122   Resp: 22   Temp: 98.3  F (36.8  C)   TempSrc: Oral     General: alert, oriented female, in mild distress due to contraction pain  CV: regular rate and rhythm  Lungs: clear bilaterally, no crackles or wheezes.   Abdomen: soft, gravid, non-tender, EFW 8ib    SVE: 3-4/70/-1 at 5pm  Membranes: intact  TOCO: 3-4 contractions q 10 minutes   Presentation: Cephalic by BSUS    FHT  Baseline: 139  Variability: Moderate  Accels: Present  Decels: Absent  [place]:  in 10 minutes    Prenatal ultrasounds:  Cephalic presentation        Prenatal Labs:   Lab Results   Component Value Date    AS Negative 06/27/2024    HEPBANG Nonreactive 06/27/2024    CHPCRT  "Negative 2023    GCPCRT Negative 2023    HGB 12.7 2025       GBS Status:   No results found for: \"GBS\"    Lab Results   Component Value Date    PAP NIL 2020       Labs: No results found for this or any previous visit (from the past 24 hours).    A/P:   24 year old  at 39w0d by LN c/w 10w US who presents for IOL.     #IOL  - Admit to labor and delivery for IOL  - SVE: 3-/-1   - Pain: Per patient request   - GBS negative  - Plan: Will initiate pitocin and plan to AROM shortly afterwards     #Fetal Well Being  - Category I FHT  - Continuous EFM  - Interventions PRN  - EFW 7#    #PNC:     - BMI 40.41  - Rh positive, Rubella immune, GCT wnl  - Hep B Antigen neg  - GBS neg  - Other infectious labs neg  - Placenta:   - Immunization: s/p TDAP and flu  - Pap: NILM (2020)   - Feed: Will discuss in postpartum period  - BC: Will discuss in postpartum period    Patient seen and care plan discussed under supervision of Dr. Benz.    Raymundo Maldonado, MS3    Resident/Fellow Attestation   I, Beatrice Estrella MD, was present with the medical/VIOLETA student who participated in the service and in the documentation of the note.  I have verified the history and personally performed the physical exam and medical decision making.  I agree with the assessment and plan of care as documented in the note.      Beatrice Estrella MD, MPH, MS  Obstetrics, Gynecology & Women's Health   Resident, PGY-3  01/15/2025 6:38 PM       "

## 2025-01-16 VITALS
RESPIRATION RATE: 16 BRPM | SYSTOLIC BLOOD PRESSURE: 122 MMHG | HEART RATE: 104 BPM | DIASTOLIC BLOOD PRESSURE: 71 MMHG | TEMPERATURE: 97.7 F | OXYGEN SATURATION: 98 %

## 2025-01-16 PROBLEM — O41.1290 CHORIOAMNIONITIS: Status: ACTIVE | Noted: 2025-01-16

## 2025-01-16 LAB
ERYTHROCYTE [DISTWIDTH] IN BLOOD BY AUTOMATED COUNT: 12.9 % (ref 10–15)
HCT VFR BLD AUTO: 31.2 % (ref 35–47)
HGB BLD-MCNC: 10.7 G/DL (ref 11.7–15.7)
HGB BLD-MCNC: 10.8 G/DL (ref 11.7–15.7)
HOLD SPECIMEN: NORMAL
LACTATE SERPL-SCNC: 1.9 MMOL/L (ref 0.7–2)
MCH RBC QN AUTO: 29.3 PG (ref 26.5–33)
MCHC RBC AUTO-ENTMCNC: 34.6 G/DL (ref 31.5–36.5)
MCV RBC AUTO: 85 FL (ref 78–100)
PLATELET # BLD AUTO: 173 10E3/UL (ref 150–450)
RBC # BLD AUTO: 3.69 10E6/UL (ref 3.8–5.2)
T PALLIDUM AB SER QL: NONREACTIVE
WBC # BLD AUTO: 18.3 10E3/UL (ref 4–11)

## 2025-01-16 PROCEDURE — 85018 HEMOGLOBIN: CPT

## 2025-01-16 PROCEDURE — 59515 CESAREAN DELIVERY: CPT | Mod: GV | Performed by: OBSTETRICS & GYNECOLOGY

## 2025-01-16 PROCEDURE — 250N000011 HC RX IP 250 OP 636

## 2025-01-16 PROCEDURE — 85014 HEMATOCRIT: CPT

## 2025-01-16 PROCEDURE — 36415 COLL VENOUS BLD VENIPUNCTURE: CPT

## 2025-01-16 PROCEDURE — 85041 AUTOMATED RBC COUNT: CPT

## 2025-01-16 PROCEDURE — 36415 COLL VENOUS BLD VENIPUNCTURE: CPT | Performed by: OBSTETRICS & GYNECOLOGY

## 2025-01-16 PROCEDURE — 250N000013 HC RX MED GY IP 250 OP 250 PS 637

## 2025-01-16 PROCEDURE — 120N000002 HC R&B MED SURG/OB UMMC

## 2025-01-16 PROCEDURE — 83605 ASSAY OF LACTIC ACID: CPT | Performed by: OBSTETRICS & GYNECOLOGY

## 2025-01-16 PROCEDURE — 258N000003 HC RX IP 258 OP 636

## 2025-01-16 PROCEDURE — 87070 CULTURE OTHR SPECIMN AEROBIC: CPT | Performed by: OBSTETRICS & GYNECOLOGY

## 2025-01-16 RX ORDER — LIDOCAINE 40 MG/G
CREAM TOPICAL
Status: DISCONTINUED | OUTPATIENT
Start: 2025-01-16 | End: 2025-01-19 | Stop reason: HOSPADM

## 2025-01-16 RX ORDER — MODIFIED LANOLIN
OINTMENT (GRAM) TOPICAL
Status: DISCONTINUED | OUTPATIENT
Start: 2025-01-16 | End: 2025-01-19 | Stop reason: HOSPADM

## 2025-01-16 RX ORDER — ONDANSETRON 2 MG/ML
4 INJECTION INTRAMUSCULAR; INTRAVENOUS EVERY 6 HOURS PRN
Status: DISCONTINUED | OUTPATIENT
Start: 2025-01-16 | End: 2025-01-19 | Stop reason: HOSPADM

## 2025-01-16 RX ORDER — DEXTROSE, SODIUM CHLORIDE, SODIUM LACTATE, POTASSIUM CHLORIDE, AND CALCIUM CHLORIDE 5; .6; .31; .03; .02 G/100ML; G/100ML; G/100ML; G/100ML; G/100ML
INJECTION, SOLUTION INTRAVENOUS CONTINUOUS
Status: DISCONTINUED | OUTPATIENT
Start: 2025-01-16 | End: 2025-01-19 | Stop reason: HOSPADM

## 2025-01-16 RX ORDER — SIMETHICONE 80 MG
80 TABLET,CHEWABLE ORAL 4 TIMES DAILY PRN
Status: DISCONTINUED | OUTPATIENT
Start: 2025-01-16 | End: 2025-01-19 | Stop reason: HOSPADM

## 2025-01-16 RX ORDER — OXYTOCIN/0.9 % SODIUM CHLORIDE 30/500 ML
340 PLASTIC BAG, INJECTION (ML) INTRAVENOUS CONTINUOUS PRN
Status: DISCONTINUED | OUTPATIENT
Start: 2025-01-16 | End: 2025-01-19 | Stop reason: HOSPADM

## 2025-01-16 RX ORDER — METRONIDAZOLE 500 MG/100ML
500 INJECTION, SOLUTION INTRAVENOUS EVERY 12 HOURS
Status: COMPLETED | OUTPATIENT
Start: 2025-01-16 | End: 2025-01-16

## 2025-01-16 RX ORDER — ACETAMINOPHEN 325 MG/1
975 TABLET ORAL EVERY 6 HOURS
Status: DISCONTINUED | OUTPATIENT
Start: 2025-01-16 | End: 2025-01-19 | Stop reason: HOSPADM

## 2025-01-16 RX ORDER — PROCHLORPERAZINE MALEATE 10 MG
10 TABLET ORAL EVERY 6 HOURS PRN
Status: DISCONTINUED | OUTPATIENT
Start: 2025-01-16 | End: 2025-01-19 | Stop reason: HOSPADM

## 2025-01-16 RX ORDER — SODIUM PHOSPHATE,MONO-DIBASIC 19G-7G/118
1 ENEMA (ML) RECTAL DAILY PRN
Status: DISCONTINUED | OUTPATIENT
Start: 2025-01-18 | End: 2025-01-19 | Stop reason: HOSPADM

## 2025-01-16 RX ORDER — AMOXICILLIN 250 MG
2 CAPSULE ORAL 2 TIMES DAILY
Status: DISCONTINUED | OUTPATIENT
Start: 2025-01-16 | End: 2025-01-19 | Stop reason: HOSPADM

## 2025-01-16 RX ORDER — TRANEXAMIC ACID 10 MG/ML
1 INJECTION, SOLUTION INTRAVENOUS EVERY 30 MIN PRN
Status: DISCONTINUED | OUTPATIENT
Start: 2025-01-16 | End: 2025-01-19 | Stop reason: HOSPADM

## 2025-01-16 RX ORDER — METOCLOPRAMIDE 10 MG/1
10 TABLET ORAL EVERY 6 HOURS PRN
Status: DISCONTINUED | OUTPATIENT
Start: 2025-01-16 | End: 2025-01-19 | Stop reason: HOSPADM

## 2025-01-16 RX ORDER — IBUPROFEN 800 MG/1
800 TABLET, FILM COATED ORAL EVERY 6 HOURS
Status: DISCONTINUED | OUTPATIENT
Start: 2025-01-17 | End: 2025-01-19 | Stop reason: HOSPADM

## 2025-01-16 RX ORDER — MISOPROSTOL 200 UG/1
400 TABLET ORAL
Status: DISCONTINUED | OUTPATIENT
Start: 2025-01-16 | End: 2025-01-19 | Stop reason: HOSPADM

## 2025-01-16 RX ORDER — KETOROLAC TROMETHAMINE 30 MG/ML
30 INJECTION, SOLUTION INTRAMUSCULAR; INTRAVENOUS EVERY 6 HOURS
Status: COMPLETED | OUTPATIENT
Start: 2025-01-16 | End: 2025-01-16

## 2025-01-16 RX ORDER — METOCLOPRAMIDE HYDROCHLORIDE 5 MG/ML
10 INJECTION INTRAMUSCULAR; INTRAVENOUS EVERY 6 HOURS PRN
Status: DISCONTINUED | OUTPATIENT
Start: 2025-01-16 | End: 2025-01-19 | Stop reason: HOSPADM

## 2025-01-16 RX ORDER — SODIUM CHLORIDE, SODIUM LACTATE, POTASSIUM CHLORIDE, CALCIUM CHLORIDE 600; 310; 30; 20 MG/100ML; MG/100ML; MG/100ML; MG/100ML
INJECTION, SOLUTION INTRAVENOUS
Status: DISPENSED
Start: 2025-01-16 | End: 2025-01-16

## 2025-01-16 RX ORDER — LOPERAMIDE HYDROCHLORIDE 2 MG/1
4 CAPSULE ORAL
Status: DISCONTINUED | OUTPATIENT
Start: 2025-01-16 | End: 2025-01-19 | Stop reason: HOSPADM

## 2025-01-16 RX ORDER — CARBOPROST TROMETHAMINE 250 UG/ML
250 INJECTION, SOLUTION INTRAMUSCULAR
Status: DISCONTINUED | OUTPATIENT
Start: 2025-01-16 | End: 2025-01-19 | Stop reason: HOSPADM

## 2025-01-16 RX ORDER — AMPICILLIN 2 G/1
2 INJECTION, POWDER, FOR SOLUTION INTRAVENOUS EVERY 6 HOURS
Status: COMPLETED | OUTPATIENT
Start: 2025-01-16 | End: 2025-01-16

## 2025-01-16 RX ORDER — MISOPROSTOL 200 UG/1
800 TABLET ORAL
Status: DISCONTINUED | OUTPATIENT
Start: 2025-01-16 | End: 2025-01-19 | Stop reason: HOSPADM

## 2025-01-16 RX ORDER — SODIUM CHLORIDE, SODIUM LACTATE, POTASSIUM CHLORIDE, CALCIUM CHLORIDE 600; 310; 30; 20 MG/100ML; MG/100ML; MG/100ML; MG/100ML
INJECTION, SOLUTION INTRAVENOUS CONTINUOUS
Status: ACTIVE | OUTPATIENT
Start: 2025-01-16 | End: 2025-01-16

## 2025-01-16 RX ORDER — OXYTOCIN 10 [USP'U]/ML
10 INJECTION, SOLUTION INTRAMUSCULAR; INTRAVENOUS
Status: DISCONTINUED | OUTPATIENT
Start: 2025-01-16 | End: 2025-01-19 | Stop reason: HOSPADM

## 2025-01-16 RX ORDER — AMOXICILLIN 250 MG
1 CAPSULE ORAL 2 TIMES DAILY
Status: DISCONTINUED | OUTPATIENT
Start: 2025-01-16 | End: 2025-01-19 | Stop reason: HOSPADM

## 2025-01-16 RX ORDER — BISACODYL 10 MG
10 SUPPOSITORY, RECTAL RECTAL DAILY PRN
Status: DISCONTINUED | OUTPATIENT
Start: 2025-01-18 | End: 2025-01-19 | Stop reason: HOSPADM

## 2025-01-16 RX ORDER — ONDANSETRON 4 MG/1
4 TABLET, ORALLY DISINTEGRATING ORAL EVERY 6 HOURS PRN
Status: DISCONTINUED | OUTPATIENT
Start: 2025-01-16 | End: 2025-01-19 | Stop reason: HOSPADM

## 2025-01-16 RX ORDER — OXYCODONE HYDROCHLORIDE 5 MG/1
5 TABLET ORAL EVERY 4 HOURS PRN
Status: DISCONTINUED | OUTPATIENT
Start: 2025-01-16 | End: 2025-01-19 | Stop reason: HOSPADM

## 2025-01-16 RX ORDER — LOPERAMIDE HYDROCHLORIDE 2 MG/1
2 CAPSULE ORAL
Status: DISCONTINUED | OUTPATIENT
Start: 2025-01-16 | End: 2025-01-19 | Stop reason: HOSPADM

## 2025-01-16 RX ORDER — HYDROCORTISONE 25 MG/G
CREAM TOPICAL 3 TIMES DAILY PRN
Status: DISCONTINUED | OUTPATIENT
Start: 2025-01-16 | End: 2025-01-19 | Stop reason: HOSPADM

## 2025-01-16 RX ORDER — METHYLERGONOVINE MALEATE 0.2 MG/ML
200 INJECTION INTRAVENOUS
Status: DISCONTINUED | OUTPATIENT
Start: 2025-01-16 | End: 2025-01-19 | Stop reason: HOSPADM

## 2025-01-16 RX ADMIN — KETOROLAC TROMETHAMINE 30 MG: 30 INJECTION, SOLUTION INTRAMUSCULAR at 08:49

## 2025-01-16 RX ADMIN — SENNOSIDES AND DOCUSATE SODIUM 2 TABLET: 50; 8.6 TABLET ORAL at 08:50

## 2025-01-16 RX ADMIN — METRONIDAZOLE 500 MG: 500 INJECTION, SOLUTION INTRAVENOUS at 04:39

## 2025-01-16 RX ADMIN — AMPICILLIN SODIUM 2 G: 2 INJECTION, POWDER, FOR SOLUTION INTRAMUSCULAR; INTRAVENOUS at 02:45

## 2025-01-16 RX ADMIN — ACETAMINOPHEN 975 MG: 325 TABLET, FILM COATED ORAL at 23:26

## 2025-01-16 RX ADMIN — AMPICILLIN SODIUM 2 G: 2 INJECTION, POWDER, FOR SOLUTION INTRAMUSCULAR; INTRAVENOUS at 08:49

## 2025-01-16 RX ADMIN — GENTAMICIN SULFATE 110 MG: 40 INJECTION, SOLUTION INTRAMUSCULAR; INTRAVENOUS at 03:28

## 2025-01-16 RX ADMIN — SIMETHICONE 80 MG: 80 TABLET, CHEWABLE ORAL at 23:29

## 2025-01-16 RX ADMIN — ACETAMINOPHEN 975 MG: 325 TABLET, FILM COATED ORAL at 04:38

## 2025-01-16 RX ADMIN — KETOROLAC TROMETHAMINE 30 MG: 30 INJECTION, SOLUTION INTRAMUSCULAR at 20:31

## 2025-01-16 RX ADMIN — KETOROLAC TROMETHAMINE 30 MG: 30 INJECTION, SOLUTION INTRAMUSCULAR at 14:29

## 2025-01-16 RX ADMIN — ACETAMINOPHEN 975 MG: 325 TABLET, FILM COATED ORAL at 17:17

## 2025-01-16 RX ADMIN — SODIUM CHLORIDE, POTASSIUM CHLORIDE, SODIUM LACTATE AND CALCIUM CHLORIDE: 600; 310; 30; 20 INJECTION, SOLUTION INTRAVENOUS at 06:56

## 2025-01-16 RX ADMIN — KETOROLAC TROMETHAMINE 30 MG: 30 INJECTION, SOLUTION INTRAMUSCULAR at 02:25

## 2025-01-16 RX ADMIN — AMPICILLIN SODIUM 2 G: 2 INJECTION, POWDER, FOR SOLUTION INTRAMUSCULAR; INTRAVENOUS at 14:27

## 2025-01-16 RX ADMIN — METRONIDAZOLE 500 MG: 500 INJECTION, SOLUTION INTRAVENOUS at 18:48

## 2025-01-16 RX ADMIN — OXYCODONE 5 MG: 5 TABLET ORAL at 23:29

## 2025-01-16 RX ADMIN — ACETAMINOPHEN 975 MG: 325 TABLET, FILM COATED ORAL at 10:26

## 2025-01-16 RX ADMIN — GENTAMICIN SULFATE 110 MG: 40 INJECTION, SOLUTION INTRAMUSCULAR; INTRAVENOUS at 11:25

## 2025-01-16 RX ADMIN — SENNOSIDES AND DOCUSATE SODIUM 2 TABLET: 50; 8.6 TABLET ORAL at 20:32

## 2025-01-16 ASSESSMENT — ACTIVITIES OF DAILY LIVING (ADL)
ADLS_ACUITY_SCORE: 41
ADLS_ACUITY_SCORE: 41
ADLS_ACUITY_SCORE: 15
ADLS_ACUITY_SCORE: 41
ADLS_ACUITY_SCORE: 15
ADLS_ACUITY_SCORE: 15
ADLS_ACUITY_SCORE: 41
ADLS_ACUITY_SCORE: 15
ADLS_ACUITY_SCORE: 41
ADLS_ACUITY_SCORE: 15
ADLS_ACUITY_SCORE: 41
ADLS_ACUITY_SCORE: 15
ADLS_ACUITY_SCORE: 41
ADLS_ACUITY_SCORE: 15
ADLS_ACUITY_SCORE: 41
ADLS_ACUITY_SCORE: 15
ADLS_ACUITY_SCORE: 41

## 2025-01-16 NOTE — PLAN OF CARE
Data: Phoebe Morris transferred to 7134 via cart at 0400. Baby transferred via parent's arms.  Action: Receiving unit notified of transfer: Yes. Patient and family notified of room change. Report given to Darby Lobato at 0415. Belongings sent to receiving unit. Accompanied by Registered Nurse. Oriented patient to surroundings. Call light within reach. ID bands double-checked with receiving RN.  Response: Patient tolerated transfer and is stable.

## 2025-01-16 NOTE — PLAN OF CARE
Data: Vital signs within normal limits. Postpartum checks within normal limits - see flow record. Patient eating and drinking normally. Patient was dizzy when trying to ambulate; richardson still in place. No apparent signs of infection. Incision healing well. Patient performing self cares and is able to care for infant.  Action: Patient medicated during the shift for pain. See MAR. Patient reassessed within 1 hour after each medication and pain was improved - patient stated she was comfortable. Patient education done about breastfeeding. See flow record.  Response: Positive attachment behaviors observed with infant. Support persons Devyn present.   Plan: Anticipate discharge on Saturday or Sunday.

## 2025-01-16 NOTE — PROVIDER NOTIFICATION
01/15/25 2034   Vital Signs   Temp 100.4  F (38  C)   Temp src Oral   Oximeter Heart Rate 109 bpm   /67     Maternal temp elevated, rechecked temp 102.2 at 2106. Dr. Stephens notified at 2059 and 2107.  Fetal tachycardia of 175 noted at 2045.  Variabiltiy moderate with accels.  Action:  Patient and support persons informed of fetal status.  Care provider notified of fetal tachycardia.  Orders received and completed  for  recheck temp and notified provider.    Response:  FHR response of 195 observed.  Plan:  Continue close observation and keep care provider informed.  Reassure patient and support persons.

## 2025-01-16 NOTE — PROVIDER NOTIFICATION
Provider notification:   Provider: Dr. Tuyet SHIELDS was notified at 2112 regarding: a persistent Category II fetal heart rate tracing for 2.5 hrs.    Category II Algorithm     Fetal heart rate and uterine activity reviewed with provider.    EFM interpretation suggests: concern for persistent Category II tracing due to: minimal variability.  EFM suggests concern for interruption of the oxygen pathway due to:  late decelerations and tachycardia.     Interventions to improve fetal oxygenation for a Category II tracing include: blood pressure check, Category II algorithm reviewed, continue monitoring, emotional support, evaluate labor progress, IV fluid bolus , maternal positioning, and sterile vaginal exam    After discussion with provider: No improvement in patient status. Lactic acid elevated, meternal fever and fetal tachycardia with minimal variability noted. Plan for caesarean delivery.

## 2025-01-16 NOTE — PLAN OF CARE
Vital signs stable. Postpartum assessment WDL ex: low urine output. Incision dressing clean and dry. Pain controlled with toradol and tylenol. Patient not yet ambulating, Mendoza catheter in place. Patient denies passing gas. Breastfeeding on cue with minimal assist. Patient and infant bonding well. Plan of care ongiong.      Goal Outcome Evaluation:       Problem: Postpartum ( Delivery)  Goal: Absence of Infection Signs and Symptoms  Outcome: Progressing    Problem: Postpartum ( Delivery)  Goal: Successful Parent Role Transition  Outcome: Progressing

## 2025-01-16 NOTE — DISCHARGE SUMMARY
[facility]   Discharge Summary    Phoebe Morris MRN# [id number]   Age: 24 year old YOB: 2000     Date of Admission:  1/15/2025  Date of Discharge::  ***  Admitting Physician:  Kailee Benz MD  Discharge Physician:  ***             Admission Diagnoses:   - Intrauterine pregnancy at 39w1d  -  at 39w1d  - cat II RFD  - Triple I             Discharge Diagnosis:     Same, delivered   ***          Procedures:     Procedure(s): Primary low segment transverse  section via pfannenstiel skin incision with 2 layer uterine closure                 Medications Prior to Admission:     Medications Prior to Admission   Medication Sig Dispense Refill Last Dose/Taking    cyclobenzaprine (FLEXERIL) 10 MG tablet Take 1 tablet (10 mg) by mouth 3 times daily. 10 tablet 0 2025    hydrOXYzine (VISTARIL) 50 MG capsule Take 1 capsule (50 mg) by mouth 3 times daily as needed for itching, anxiety or other (sleep). 30 capsule 0 2025    metoclopramide (REGLAN) 5 MG tablet Take 1 tablet (5 mg) by mouth 4 times daily as needed (nausea) 30 tablet 0 2025    ondansetron (ZOFRAN) 4 MG tablet Take 1 tablet (4 mg) by mouth every 8 hours as needed for nausea 30 tablet 5 2025    pantoprazole (PROTONIX) 20 MG EC tablet Take 1 tablet (20 mg) by mouth 2 times daily. 60 tablet 0 2025    Prenatal Vit-DSS-Fe Cbn-FA (PRENATAL AD PO)    2025             Discharge Medications:   ***          Consultations:   ***          Brief Admission History:   Phoebe Morris is a 24 year old, , who was admitted at 39w1d  for eIOL.  She developed cat II tracing remote from delivery with triple I unable to be resuscitated.The risks, benefits, and alternatives of  delivery were explained and the patient agreed to proceed.      Intraoperative course   The procedure was uncomplicated***.  EBL *** mL.  See operative report for details.     Operative findings: ***        Postpartum Course   The patient's hospital course was unremarkable.  She recovered as anticipated and experienced no post-operative complications***. *** On discharge, her pain was well controlled.*** Vaginal bleeding is similar to peak menstrual flow.  Voiding without difficulty.  Ambulating well, tolerating a normal diet, and has resumption of bowel function.  No fever or significant wound drainage.  Breastfeeding*** well.  Infant is stable. She was discharged on post-partum day #3***.    Post-partum hemoglobin:   Hemoglobin   Date Value Ref Range Status   01/16/2025 10.7 (L) 11.7 - 15.7 g/dL Final   12/13/2020 15.4 11.7 - 15.7 g/dL Final     Contraception: ***  Rh positive, no Rhogam indicated***  Rubella immune, no MMR indicated***          Discharge Instructions and Follow-Up:     Discharge diet: Regular   Discharge activity: No lifting greater than 20 lbs, pushing, pulling, or other strenuous activity for 6 weeks. Pelvic rest for 6 weeks including no sexual intercourse, tampons, or douching. No driving until you can slam on the breaks without pain or while on narcotic pain medications.    Discharge follow-up: Follow up with primary OB for routine postpartum visit in 6 weeks  ***   Wound care: Keep incision clean and dry           Discharge Disposition:     Discharged to home     ***        ondansetron 4 MG tablet  Commonly known as: ZOFRAN  Used for: Nausea      Dose: 4 mg  Take 1 tablet (4 mg) by mouth every 8 hours as needed for nausea  Quantity: 30 tablet  Refills: 5     pantoprazole 20 MG EC tablet  Commonly known as: PROTONIX  Used for: Heartburn in pregnancy in third trimester      Dose: 20 mg  Take 1 tablet (20 mg) by mouth 2 times daily.  Quantity: 60 tablet  Refills: 0     PRENATAL AD PO      Refills: 0               Where to get your medicines        These medications were sent to [place], [place] - [address]  [address] [address]      Phone: [phone]   acetaminophen 325 MG tablet  cyclobenzaprine 10 MG tablet  ferrous gluconate 324 (38 Fe) MG tablet  ibuprofen 600 MG tablet  oxyCODONE 5 MG tablet  polyethylene glycol 17 GM/Dose powder  senna-docusate 8.6-50 MG tablet  sertraline 25 MG tablet            Consultations:   Anesthesia  Lactation           Brief Admission History:   Phoebe Morris is a 24 year old  at 39w0d by [facility] c/w 10w  who presents for IOL.      Patient was here this morning with contractions and cervical dilatation of 1.5 cm. She was discharged back home with a cook catheter for cervical ripening. Her contractions have since increased in intensity and frequency. The contractions are painful and occur every couple minutes. She was then admitted to labor and delivery.     Intrapartum/intaoperative course   Following admission, the patient had SROM and met criteria for triple I. She received ampicillin and gentamicin for this. She then developed a category II FHT remote from delivery and  section delivery was recommended. She was counseled on the risks, benefits, and alternatives to  delivery and elected to proceed.     The procedure was complicated by a postpartum hemorrhage. EBL 1250 mL. See operative report for details.     Operative findings:   - Single, liveborn s on 2025.  Apgars of 8 and 9 at one and five minutes.  Fetal presentation: OP. Amniotic fluid: clear fluid.    - Placenta intact with 3 vessel cord.     - Normal appearing uterus, fallopian tubes, ovaries.   -  No intraabdominal adhesions.  No abdominal wall adhesions       Postpartum Course   The patient's hospital course was also notable for meeting criteria for gestational hypertension. At discharge, she was normotensive without medications. She was enrolled in [facility] at discharge. She also expressed feelings of trauma and anxiety regarding her birth experience. After debriefing the experience, she elected to start Zoloft during her hospitalization. She otherwise recovered as anticipated and experienced no post-operative complications. On discharge, her pain was well controlled. Vaginal bleeding is less than peak menstrual flow. Voiding without difficulty. Ambulating well, tolerating a normal diet, and has resumption of bowel function. She was afebrile for more than 24 hours prior to discharge. Breastfeeding well. Infant is stable. She was discharged on post-partum day #3.    Post-partum hemoglobin:   Hemoglobin   Date Value Ref Range Status   01/19/2025 9.7 (L) 11.7 - 15.7 g/dL Final   12/13/2020 15.4 11.7 - 15.7 g/dL Final     Contraception: Declines, plans natural cycle tracking  Rh positive, no Rhogam indicated  Rubella immune, no MMR indicated          Discharge Instructions and Follow-Up:     Discharge diet: Regular   Discharge activity: No lifting greater than 20 lbs, pushing, pulling, or other strenuous activity for 6 weeks. Pelvic rest for 6 weeks including no sexual intercourse, tampons, or douching. No driving until you can slam on the breaks without pain or while on narcotic pain medications.    Discharge follow-up: Follow up with primary OB for routine postpartum visit and mood checks in 2 and 6 weeks   Wound care: Keep incision clean and dry           Discharge Disposition:     Discharged to home     Josiane  MD Yamil PGY1  [facility]  Obstetrics, Gynecology, & Women's Health      Physician Attestation   I, Paty Hooper, have seen and examined this patient.  I have reviewed the above note and agree with discharge plan as documented in the above note. We discussed family planning and need for condoms while breastfeeding, until cycles resume and she can use NFP.      Paty Hooper MD  Date of Service (when I saw the patient): 01/19/25

## 2025-01-16 NOTE — PROVIDER NOTIFICATION
01/15/25 0453   Provider Notification   Provider Name/Title Dr. Benz   Method of Notification Phone   Request Evaluate - Remote   Notification Reason Variability Change     Notified about Fetal tachycardia with changed from moderate to minimal. Temp after tylenol and fluid bolus 99.6.

## 2025-01-16 NOTE — OP NOTE
[facility]  Operative Note    Surgery Date:  2025  Surgeon(s): Kailee Benz MD  Assistants:  Maximino Stephens DO, MA G3    Preoperative Diagnoses:  -  at 39w1d  - cat II RFD  - Triple I    Postoperative diagnoses:  -  at 39w1d, now delivered    Procedure performed: Primary low segment transverse  section via pfannenstiel skin incision with 2 layer uterine closure    Anesthesia:  Spinal   Est Blood Loss (mL): 1250 mL  Fluid replacement: 1000 mL crystalloid  Urine Output: 200 mL  Specimens: None  Additional intraoperative medications: none  Complications: None apparent    Operative findings:   - Single, liveborn s on 2025. Apgars of 8 and 9 at one and five minutes.  Birth weight: pending.  Fetal presentation: OP. Amniotic fluid: clear fluid.    - Placenta intact with 3 vessel cord.     - Normal appearing uterus, fallopian tubes, ovaries.   -  No intraabdominal adhesions.  No abdominal wall adhesions    Disposition: Transferred in stable condition to the PACU    Indication: Phoebe Morris is a 24 year old, , who was admitted at 39w1d  for eIOL.  She developed cat II tracing remote from delivery with triple I unable to be resuscitated.The risks, benefits, and alternatives of  delivery were explained and the patient agreed to proceed.     Procedure details:  After obtaining informed consent, the patient was taken to the operating room. She was placed in the dorsal supine position with a leftward tilt and prepped and draped in the usual sterile fashion.     Following test of adequate spinal anesthesia, the abdomen was entered through a transverse skin incision. The skin incision was made sharply and carried through the subcutaneous tissue to the fascia.  Fascia was incised in the midline and extended laterally with the Villafana scissors.  The superior margin of the fascial incision was grasped with Kocher clamps and dissected from the  underlying muscle with sharp and blunt dissection, which was then repeated at the lower margin of the fascial incision. The muscle was  in the midline.      The peritoneum was entered bluntly and the opening extended by digital dissection with care to avoid the bladder. A bladder blade was placed. The lower segment of the uterus was opened sharply in a transverse fashion and extended with digital pressure. The infant's head was noted to be in the OP position. It was elevated to the level of the hysterotomy and was delivered atraumatically, shoulders delivered easily thereafter. The cord was doubly clamped after 60 seconds and cut and the infant was handed off to the waiting  staff. A segment of the cord was cut and set aside for cord gases if needed.     The placenta was expressed from the uterus.  The uterus was exteriorized from the abdomen and cleared of all clots and debris.  The uterus was massaged. Pitocin was given through the running IV.  The hysterotomy was repaired with 0-vicryl suture in a running locked fashion. A 2nd layer of 0-Vicryl was  used to imbricate the incision and good hemostasis was achieved. The posterior cul-de-sac was suctioned and the uterus was returned to the abdomen.      The bilateral pericolic gutters were suctioned.  The hysterotomy was again inspected and found to be hemostatic.  The abdominal wall was examined and also found to be hemostatic.  The fascia was closed with a running suture of 0-looped PDS.  Subcutaneous tissue was irrigated. Areas that were not hemostatic were controlled with cautery. The subcutaneous tissue was re-approximated with 3-0 plain gut. The skin was closed with 4-0 monocryl in a subcuticular fashion. The patient tolerated the procedure well and was taken to the recovery room in stable condition. All sponge, needle and instrument counts were correct x2.     Dr. Benz was present for the entire procedure.     Maximino BALLARDN OBGYN-  PGY3  1:45 AM January 16, 2025

## 2025-01-16 NOTE — PLAN OF CARE
Meternal temp and HR elevated during induction, BP WNL. Pt leaking moderate amounts of clear fluid. Signs and symptoms of infection present and include fever, fetal tachycardia. EFM charted, see flowsheet and see previous notes for details. Signs and symptoms of pre-eclampsia: absent, ankle edema, chest pain, headache, and shortness of breath.  Support person  present. Pt symptoms and labs worsening. Provider called to bedside to discuss continuing labor with patient. C/S recommended by provider based on clinical findings of mother and EFM. Pt agreeable with plan. Consents signed, pre-op preps completed. Pt transferred to OR 1 at 2359. C/S delivery of viable Female with NICU, providers, RNs in attendance. Infant with spontaneous cry, transferred to warmer for NICU assessment and to mother's abdomen.  APGAR at 1 minute:  8 and APGAR at 5 minutes:  9.  Placenta delivered with out complication.  Mother and baby in stable condition.

## 2025-01-16 NOTE — PROVIDER NOTIFICATION
01/15/25 2112   Provider Notification   Provider Name/Title Dr. Benz   Method of Notification At Bedside   Request Evaluate in Person   Notification Reason Fetal Baseline Change;Maternal Vital Sign Change     Discussed fetal tracing and pt temp and plans for antibiotic. Pt agreeable to plan. Tylenol given, antibiotics started per order set

## 2025-01-16 NOTE — PROGRESS NOTES
OB progress note     FHTs reassessed with RN team, persistently cat II with tachycardia and now minimal variability for >30 min remote from delivery not improved with resolution of maternal fever and fluid resuscitation. Recommend proceeding with primary CS due to NRFHTs remote from delivery.     Risks and benefits of surgery discussed with patient who wishes to proceed. Pt counseled on risks of bleeding, infection, damage to surrounding structures (bowel, bladder, ureters, nerves, blood vessels, infant) necessitating further surgery. Pt further counseled regarding surgical and medical management of uterine hemorrhage. She does accept blood products. Questions answered to the patient and her family's apparent satisfaction. Consent form signed for  section.    She received amp and gent for triple I, will add clindamycin and continue for at least one dose post-op. Lactic acid 2.3, maternal pulse 110-120s, BP stable. Will continue close monitoring, repeat lactic acid in 2 hours and fluid resuscitation.     Dispo: proceed to OR    Kailee Benz MD

## 2025-01-16 NOTE — PROGRESS NOTES
Labor and Delivery Progress Note    Phoebe Morris MRN# [id number]   Age: 24 year old YOB: 2000           Subjective:   Notifed by RN that patient is febrile with a temp of 102.2 and maternal and fetal tachycardia noted. Phoebe reports that she is feeling well. Epidural in place and working well. Denies any cough, colds, dysuria or sick contacts at home. She had a richardson balloon placed for ripening this AM and noted some mucousy discharge but no leaking of fluid until SROM at 1730 this evening.            Objective:   Patient Vitals for the past 24 hrs:   BP Temp Temp src Pulse Resp SpO2 Oximeter Heart Rate   01/15/25 2104 -- (!) 102.2  F (39  C) Oral -- -- -- --   01/15/25 2034 123/67 100.4  F (38  C) Oral -- -- -- 109 bpm   01/15/25 1924 104/57 98.2  F (36.8  C) Oral -- 16 -- 110 bpm   01/15/25 1916 111/58 -- -- -- -- 97 % 85 bpm   01/15/25 1913 108/56 -- -- -- -- 97 % 89 bpm   01/15/25 1906 99/59 -- -- -- -- 98 % 109 bpm   01/15/25 1901 (!) 144/98 97.8  F (36.6  C) Oral -- 22 98 % 117 bpm   01/15/25 1857 116/68 -- -- -- -- -- 90 bpm   01/15/25 1854 118/70 -- -- -- -- 97 % 76 bpm   01/15/25 1851 (!) 81/49 -- -- -- -- 97 % 88 bpm   01/15/25 1846 99/59 -- -- -- -- -- 103 bpm   01/15/25 1843 96/56 -- -- -- 22 96 % 101 bpm   01/15/25 1835 102/55 -- -- -- -- -- 141 bpm   01/15/25 1833 110/59 -- -- -- -- 98 % 139 bpm   01/15/25 1831 118/69 -- -- -- -- -- 126 bpm   01/15/25 1829 115/64 -- -- -- -- 98 % 137 bpm   01/15/25 1827 125/78 -- -- -- -- 98 % 131 bpm   01/15/25 1825 127/78 -- -- -- -- -- 104 bpm   01/15/25 1823 132/77 -- -- -- -- 99 % 105 bpm   01/15/25 1537 123/84 98.3  F (36.8  C) Oral (!) 122 22 -- 122 bpm       General: patient alert and well appearing   Abdomen: gravid, non-tender to palpation (but epidural in placed)   Ext: trace edema     Cervical Exam: 4/80/-1, IUPC placed   Membranes: Ruptured      Fetal Heart Rate:    Monitor: external US    Variability: moderate  (amplitude range 6 to 25 bpm)    Baseline Rate: tachycardia    Fetal Heart Rate Tracin/mod/+accels/no decels           Assessment:   Phoebe Morris is a 24 year old  who is 39w0d here for eIOL, now with intrapartum fever           Plan:   Intrapartum fever likely 2/2 chorioamnionitis   -We discussed that most likely source of her fever is chorioamnionitis given lack of other symptoms but it is unusual for chorio to start so soon after SROM (<4 hours).   -IV amp/gent started, IV fluid bolus given and 1g tylenol administered. Will also get CBC and lactic acid to evaluate for sepsis and closely monitor maternal and fetal response to fluid resuscitation. Flu/COVID/RSV and urine culture also ordered to rule out other sources of infection given atypical presentation for chorio.   -We discussed that chorioamnionitis alone is not an indication for  section but we do need to closely monitor maternal and fetal status and also actively manage labor to affect delivery as soon as possible.    IOL   -S/P outpatient cervical ripening balloon and SROM at 1730, pitocin not started due to runs of frequent contractions and then difficulty monitoring contractions but IUPC placed to allow us to titrate pitocin.     FWB  -Cat I 2/2 fetal tachycardia but otherwise reassuring, continue intrauterine resuscitation with fluid bolus and tylenol to bring down maternal fever. Will closely monitor   -EFW 8.5 lbs     4. Rh+/RI/GBS neg     5. Epidural in place for pain management    Dispo: will reassess maternal fetal status in 30-60 min or sooner PRN    MD Kailee Patel MD

## 2025-01-16 NOTE — PLAN OF CARE
Patient is comfortable with her epidural. Had to treat patient x1 with phenylephrine and hypotension resolved. Patient continues to contract spontaneously. Contractions every 2-4 minutes palpating moderate.  with moderate variability. Accels present and decels are not present. FHR did have late decels during hypotension, but resolved with treatment. SROM at 1730- clear fluid. MD updated. Anticipate . Report given to Jaren, who assumes care of this patient at this time.

## 2025-01-16 NOTE — PLAN OF CARE
OR to PACU Transfer Note  Data: Pt to OB PACU at [id number] via cart. PIV infusing NS with pitocin without complications, richardson with clear urine to gravity, temp 100.6, vs WNL, pt does not complain of pain and/or nausea.   Interventions: IV to pump, monitors and alarms on, SCD on.  Response: stable.  Plan: Patient instructed to notify RN for pain or nausea, routine post op cares, initiate breastfeeding/pumping as soon as patient/infant able.

## 2025-01-16 NOTE — PROVIDER NOTIFICATION
"   01/16/25 0625   Provider Notification   Provider Name/Title Dr. Stephens   Method of Notification Electronic Page   Request Evaluate-Remote   Notification Reason Status Update     \"Good morning. FYI just emptied Phoebe's richardson- she has only had 75 mL out since it was last emptied at 0300.\"  "

## 2025-01-16 NOTE — PROGRESS NOTES
Patient arrived to [facility] unit via zoom cart at 0400 with belongings, accompanied by spouse/ significant other, with infant in arms. Received report from  Jaren  and checked bands. Unit and room orientation completd. Call light given; no concerns present at this time. IV antibiotics infusing. Plan of care ongoing.

## 2025-01-17 PROBLEM — O13.9 GESTATIONAL HYPERTENSION: Status: ACTIVE | Noted: 2025-01-17

## 2025-01-17 LAB
ALT SERPL W P-5'-P-CCNC: 7 U/L (ref 0–50)
AST SERPL W P-5'-P-CCNC: 19 U/L (ref 0–45)
CREAT SERPL-MCNC: 0.72 MG/DL (ref 0.51–0.95)
EGFRCR SERPLBLD CKD-EPI 2021: >90 ML/MIN/1.73M2
ERYTHROCYTE [DISTWIDTH] IN BLOOD BY AUTOMATED COUNT: 13.2 % (ref 10–15)
HCT VFR BLD AUTO: 32.6 % (ref 35–47)
HGB BLD-MCNC: 11.1 G/DL (ref 11.7–15.7)
MCH RBC QN AUTO: 29.1 PG (ref 26.5–33)
MCHC RBC AUTO-ENTMCNC: 34 G/DL (ref 31.5–36.5)
MCV RBC AUTO: 86 FL (ref 78–100)
PLATELET # BLD AUTO: 203 10E3/UL (ref 150–450)
RBC # BLD AUTO: 3.81 10E6/UL (ref 3.8–5.2)
WBC # BLD AUTO: 17.9 10E3/UL (ref 4–11)

## 2025-01-17 PROCEDURE — 84450 TRANSFERASE (AST) (SGOT): CPT

## 2025-01-17 PROCEDURE — 250N000011 HC RX IP 250 OP 636

## 2025-01-17 PROCEDURE — 36415 COLL VENOUS BLD VENIPUNCTURE: CPT | Performed by: OBSTETRICS & GYNECOLOGY

## 2025-01-17 PROCEDURE — 250N000013 HC RX MED GY IP 250 OP 250 PS 637

## 2025-01-17 PROCEDURE — 85041 AUTOMATED RBC COUNT: CPT | Performed by: OBSTETRICS & GYNECOLOGY

## 2025-01-17 PROCEDURE — 84460 ALANINE AMINO (ALT) (SGPT): CPT

## 2025-01-17 PROCEDURE — 85048 AUTOMATED LEUKOCYTE COUNT: CPT | Performed by: OBSTETRICS & GYNECOLOGY

## 2025-01-17 PROCEDURE — 120N000002 HC R&B MED SURG/OB UMMC

## 2025-01-17 PROCEDURE — 82565 ASSAY OF CREATININE: CPT

## 2025-01-17 RX ORDER — ACETAMINOPHEN 325 MG/1
650 TABLET ORAL EVERY 6 HOURS PRN
Qty: 100 TABLET | Refills: 0 | Status: SHIPPED | OUTPATIENT
Start: 2025-01-17

## 2025-01-17 RX ORDER — ENOXAPARIN SODIUM 100 MG/ML
40 INJECTION SUBCUTANEOUS EVERY 12 HOURS
Status: DISCONTINUED | OUTPATIENT
Start: 2025-01-17 | End: 2025-01-19 | Stop reason: HOSPADM

## 2025-01-17 RX ORDER — IBUPROFEN 600 MG/1
600 TABLET, FILM COATED ORAL EVERY 6 HOURS PRN
Qty: 60 TABLET | Refills: 0 | Status: SHIPPED | OUTPATIENT
Start: 2025-01-17

## 2025-01-17 RX ORDER — CALCIUM CARBONATE 500 MG/1
500 TABLET, CHEWABLE ORAL DAILY PRN
Status: DISCONTINUED | OUTPATIENT
Start: 2025-01-17 | End: 2025-01-19 | Stop reason: HOSPADM

## 2025-01-17 RX ORDER — AMOXICILLIN 250 MG
1 CAPSULE ORAL DAILY
Qty: 100 TABLET | Refills: 0 | Status: SHIPPED | OUTPATIENT
Start: 2025-01-17

## 2025-01-17 RX ADMIN — IBUPROFEN 800 MG: 800 TABLET, FILM COATED ORAL at 02:07

## 2025-01-17 RX ADMIN — ACETAMINOPHEN 975 MG: 325 TABLET, FILM COATED ORAL at 11:28

## 2025-01-17 RX ADMIN — ENOXAPARIN SODIUM 40 MG: 40 INJECTION SUBCUTANEOUS at 11:28

## 2025-01-17 RX ADMIN — OXYCODONE 5 MG: 5 TABLET ORAL at 04:05

## 2025-01-17 RX ADMIN — ACETAMINOPHEN 975 MG: 325 TABLET, FILM COATED ORAL at 23:50

## 2025-01-17 RX ADMIN — SENNOSIDES AND DOCUSATE SODIUM 2 TABLET: 50; 8.6 TABLET ORAL at 08:08

## 2025-01-17 RX ADMIN — OXYCODONE 5 MG: 5 TABLET ORAL at 22:53

## 2025-01-17 RX ADMIN — ACETAMINOPHEN 975 MG: 325 TABLET, FILM COATED ORAL at 17:36

## 2025-01-17 RX ADMIN — OXYCODONE 5 MG: 5 TABLET ORAL at 17:36

## 2025-01-17 RX ADMIN — SIMETHICONE 80 MG: 80 TABLET, CHEWABLE ORAL at 23:55

## 2025-01-17 RX ADMIN — IBUPROFEN 800 MG: 800 TABLET, FILM COATED ORAL at 14:07

## 2025-01-17 RX ADMIN — IBUPROFEN 800 MG: 800 TABLET, FILM COATED ORAL at 08:08

## 2025-01-17 RX ADMIN — OXYCODONE 5 MG: 5 TABLET ORAL at 08:08

## 2025-01-17 RX ADMIN — ANTACID TABLETS 500 MG: 500 TABLET, CHEWABLE ORAL at 05:29

## 2025-01-17 RX ADMIN — SIMETHICONE 80 MG: 80 TABLET, CHEWABLE ORAL at 12:49

## 2025-01-17 RX ADMIN — SENNOSIDES AND DOCUSATE SODIUM 2 TABLET: 50; 8.6 TABLET ORAL at 20:05

## 2025-01-17 RX ADMIN — ENOXAPARIN SODIUM 40 MG: 40 INJECTION SUBCUTANEOUS at 22:53

## 2025-01-17 RX ADMIN — IBUPROFEN 800 MG: 800 TABLET, FILM COATED ORAL at 20:06

## 2025-01-17 RX ADMIN — ACETAMINOPHEN 975 MG: 325 TABLET, FILM COATED ORAL at 05:29

## 2025-01-17 RX ADMIN — OXYCODONE 5 MG: 5 TABLET ORAL at 13:37

## 2025-01-17 ASSESSMENT — ACTIVITIES OF DAILY LIVING (ADL)
ADLS_ACUITY_SCORE: 21
ADLS_ACUITY_SCORE: 21
ADLS_ACUITY_SCORE: 15
ADLS_ACUITY_SCORE: 21
ADLS_ACUITY_SCORE: 21
ADLS_ACUITY_SCORE: 15
ADLS_ACUITY_SCORE: 21
ADLS_ACUITY_SCORE: 15
ADLS_ACUITY_SCORE: 21
ADLS_ACUITY_SCORE: 15
ADLS_ACUITY_SCORE: 21
ADLS_ACUITY_SCORE: 15
ADLS_ACUITY_SCORE: 15
ADLS_ACUITY_SCORE: 21
ADLS_ACUITY_SCORE: 15
ADLS_ACUITY_SCORE: 21
ADLS_ACUITY_SCORE: 15

## 2025-01-17 NOTE — PLAN OF CARE
VSS.  Pain is tolerable.  Phoebe out of bed with minimal assist.  Mendoza removed and void x1.  Pt's goals are to get some sleep tonight and continue working on breastfeeding.

## 2025-01-17 NOTE — PLAN OF CARE
Goal Outcome Evaluation:  VSS and postpartum assessments WDL, one mild range bp, recheck 3 hours later was WDL. Voiding spontaneously without difficulty.  Up ad ciera with steady gait and independent with cares.  Bonding well with infant.  Breastfeeding on cue with assistance to latch. Pumped x1 and hand expressing.  Pain managed with tylenol, ibuprofen and oxy x1.  Significant other Devyn present and supportive.  Will continue with postpartum cares and education per plan of care.                      Problem: Adult Inpatient Plan of Care  Goal: Optimal Comfort and Wellbeing  Intervention: Monitor Pain and Promote Comfort  Recent Flowsheet Documentation  Taken 1/16/2025 2030 by Yari Lin, RN  Pain Management Interventions: medication (see MAR)  Intervention: Provide Person-Centered Care  Recent Flowsheet Documentation  Taken 1/16/2025 2144 by Yari Lin, RN  Trust Relationship/Rapport:   care explained   choices provided

## 2025-01-17 NOTE — DISCHARGE INSTRUCTIONS
Social Work attempted to meet with you and tried calling you on your cell phone and your room phone multiple times today. Social Work provided postpartum mental health resources to your nurse for you to review when needed. It was told that you have concerns with feeling overwhelmed with anxiety to return. Recommendations would be to utilize your support system (ie. Friends, family, partner, etc), connect with your primary care provider regarding any symptoms you are feeling, utilize crisis hotlines, emergency departments, etc for any mental health concerns, especially late at night, remember to make sure you are getting basics in like: eating, drinking water, sleeping, showering, seeing sunlight, moving your body intentionally, and becoming familiar with the symptoms of postpartum - it can be helpful if loved ones/our support systems are also aware of symptoms to be mindful of. On-going therapy and psychiatry on a outpatient basis can be helpful as well.     Warning Signs after Having a Baby    Keep this paper on your fridge or somewhere else where you can see it.    Call your provider if you have any of these symptoms up to 12 weeks after having your baby.    Thoughts of hurting yourself or your baby  Pain in your chest or trouble breathing  Severe headache not helped by pain medicine  Eyesight concerns (blurry vision, seeing spots or flashes of light, other changes to eyesight)  Fainting, shaking or other signs of a seizure    Call 9-1-1 if you feel that it is an emergency.     The symptoms below can happen to anyone after giving birth. They can be very serious. Call your provider if you have any of these warning signs.    My provider s phone number: _______________________    Losing too much blood (hemorrhage)    Call your provider if you soak through a pad in less than an hour or pass blood clots bigger than a golf ball. These may be signs that you are bleeding too much.    Blood clots in the legs or  lungs    After you give birth, your body naturally clots its blood to help prevent blood loss. Sometimes this increased clotting can happen in other areas of the body, like the legs or lungs. This can block your blood flow and be very dangerous.     Call your provider if you:  Have a red, swollen spot on the back of your leg that is warm or painful when you touch it.   Are coughing up blood.     Infection    Call your provider if you have any of these symptoms:  Fever of 100.4 F (38 C) or higher.  Pain or redness around your stitches if you had an incision.   Any yellow, white, or green fluid coming from places where you had stitches or surgery.    Mood Problems (postpartum depression)    Many people feel sad or have mood changes after having a baby. But for some people, these mood swings are worse.     Call your provider right away if you feel so anxious or nervous that you can't care for yourself or your baby.    Preeclampsia (high blood pressure)    Even if you didn't have high blood pressure when you were pregnant, you are at risk for the high blood pressure disease called preeclampsia. This risk can last up to 12 weeks after giving birth.     Call your provider if you have:   Pain on your right side under your rib cage  Sudden swelling in the hands and face    Remember: You know your body. If something doesn't feel right, get medical help.     For informational purposes only. Not to replace the advice of your health care provider. Copyright 2020 [facility]. All rights reserved. Clinically reviewed by Raquel Garrison, RNC-OB, MSN. [employer] [id number] - Rev .     Section: What to Expect at Home  Your Recovery     A  section, or , is surgery to deliver your baby through a cut that the doctor makes in your lower belly and uterus. The cut is called an incision.  You may have some pain in your lower belly and need pain medicine for 1 to 2 weeks. You can expect some vaginal  bleeding for several weeks. You will probably need about 6 weeks to fully recover.  It's important to take it easy while the incision heals. Avoid heavy lifting, strenuous activities, and exercises that strain the belly muscles while you recover. Ask a family member or friend for help with housework, cooking, and shopping.  This care sheet gives you a general idea about how long it will take for you to recover. But each person recovers at a different pace. Follow the steps below to get better as quickly as possible.  How can you care for yourself at home?  Activity       Rest when you feel tired. Getting enough sleep will help you recover.        Try to walk each day. Start by walking a little more than you did the day before. Bit by bit, increase the amount you walk. Walking boosts blood flow and helps prevent pneumonia, constipation, and blood clots.        Avoid strenuous activities, such as bicycle riding, jogging, weightlifting, and aerobic exercise, for 6 weeks or until your doctor says it is okay.        Until your doctor says it is okay, do not lift anything heavier than your baby.        Do not do sit-ups or other exercises that strain the belly muscles for 6 weeks or until your doctor says it is okay.        Hold a pillow over your incision when you cough or take deep breaths. This will support your belly and decrease your pain.        You may shower as usual. Pat the incision dry when you are done.        You will have some vaginal bleeding. Wear sanitary pads. Do not douche or use tampons until your doctor says it is okay.        Ask your doctor when you can drive again.        You will probably need to take at least 6 weeks off work. It depends on the type of work you do and how you feel.        Ask your doctor when it is okay for you to have sex.   Diet       You can eat your normal diet. If your stomach is upset, try bland, low-fat foods like plain rice, broiled chicken, toast, and yogurt.         Drink plenty of fluids (unless your doctor tells you not to).        You may notice that your bowel movements are not regular right after your surgery. This is common. Try to avoid constipation and straining with bowel movements. You may want to take a fiber supplement every day. If you have not had a bowel movement after a couple of days, ask your doctor about taking a mild laxative.        If you are breastfeeding, limit alcohol. Alcohol can cause a lack of energy and other health problems for the baby when a breastfeeding woman drinks heavily. It can also get in the way of a mom's ability to feed her baby or to care for the child in other ways. There isn't a lot of research about exactly how much alcohol can harm a baby. Having no alcohol is the safest choice for your baby. If you choose to have a drink now and then, have only one drink, and limit the number of occasions that you have a drink. Wait to breastfeed at least 2 hours after you have a drink to reduce the amount of alcohol the baby may get in the milk.   Medicines       Your doctor will tell you if and when you can restart your medicines. You will also get instructions about taking any new medicines.        If you stopped taking aspirin or some other blood thinner, your doctor will tell you when to start taking it again.        Take pain medicines exactly as directed.  If the doctor gave you a prescription medicine for pain, take it as prescribed.  If you are not taking a prescription pain medicine, ask your doctor if you can take an over-the-counter medicine.        If you think your pain medicine is making you sick to your stomach:  Take your medicine after meals (unless your doctor has told you not to).  Ask your doctor for a different pain medicine.        If your doctor prescribed antibiotics, take them as directed. Do not stop taking them just because you feel better. You need to take the full course of antibiotics.   Incision care       If you  have strips of tape on the incision, leave the tape on for a week or until it falls off.        Wash the area daily with warm, soapy water, and pat it dry. Don't use hydrogen peroxide or alcohol, which can slow healing. You may cover the area with a gauze bandage if it weeps or rubs against clothing. Change the bandage every day.        Keep the area clean and dry.   Other instructions       If you breastfeed your baby, you may be more comfortable while you are healing if you don't rest your baby on your belly. Try tucking your baby under your arm, with your baby's body along the side you will be feeding on. Support your baby's upper body with your arm. With that hand you can control your baby's head to bring your baby's mouth to your breast. This is sometimes called the football hold.   Follow-up care is a key part of your treatment and safety. Be sure to make and go to all appointments, and call your doctor if you are having problems. It's also a good idea to know your test results and keep a list of the medicines you take.  When should you call for help?  Share this information with your partner, family, or a friend. They can help you watch for warning signs.  Call 911  anytime you think you may need emergency care. For example, call if:       You feel you cannot stop from hurting yourself, your baby, or someone else.        You passed out (lost consciousness).        You have chest pain, are short of breath, or cough up blood.        You have a seizure.   Where to get help 24 hours a day, 7 days a week   If you or someone you know talks about suicide, self-harm, a mental health crisis, a substance use crisis, or any other kind of emotional distress, get help right away. You can:       Call the Suicide and Crisis Lifeline at 988.        Call 5-002-218-TALK (1-342.160.9362).        Text HOME to 104689 to access the Crisis Text Line.   Consider saving these numbers in your phone.  Go to [employer].org for more  information or to chat online.  Call your doctor or midwife now or seek immediate medical care if:       You have loose stitches, or your incision comes open.        You have signs of hemorrhage (too much bleeding), such as:  Heavy vaginal bleeding. This means that you are soaking through one or more pads in an hour. Or you pass blood clots bigger than an egg.  Feeling dizzy or lightheaded, or you feel like you may faint.  Feeling so tired or weak that you cannot do your usual activities.  A fast or irregular heartbeat.  New or worse belly pain.        You have symptoms of infection, such as:  Increased pain, swelling, warmth, or redness.  Red streaks leading from the incision.  Pus draining from the incision.  A fever.  Frequent or painful urination or blood in your urine.  Vaginal discharge that smells bad.  New or worse belly pain.        You have symptoms of a blood clot in your leg (called a deep vein thrombosis), such as:  Pain in the calf, back of the knee, thigh, or groin.  Swelling in the leg or groin.  A color change on the leg or groin. The skin may be reddish or purplish, depending on your usual skin color.        You have signs of preeclampsia, such as:  Sudden swelling of your face, hands, or feet.  New vision problems (such as dimness, blurring, or seeing spots).  A severe headache.        You have signs of heart failure, such as:  New or increased shortness of breath.  New or worse swelling in your legs, ankles, or feet.  Sudden weight gain, such as more than 2 to 3 pounds in a day or 5 pounds in a week.  Feeling so tired or weak that you cannot do your usual activities.        You had spinal or epidural pain relief and have:  New or worse back pain.  Increased pain, swelling, warmth, or redness at the injection site.  Tingling, weakness, or numbness in your legs or groin.   Watch closely for changes in your health, and be sure to contact your doctor or midwife if:       Your vaginal bleeding isn't  "decreasing.        You feel sad, anxious, or hopeless for more than a few days.        You are having problems with your breasts or breastfeeding.   Where can you learn more?  Go to https://www.Crossfader.Kyruus/patiented  Enter M806 in the search box to learn more about \" Section: What to Expect at Home.\"  Current as of: 2024  Content Version: 14.3    2024 [employer].   Care instructions adapted under license by your healthcare professional. If you have questions about a medical condition or this instruction, always ask your healthcare professional. [employer] disclaims any warranty or liability for your use of this information.    Anxiety During and After Pregnancy: Care Instructions  Anxiety means feeling worried that something bad may happen. Some anxiety during and after pregnancy is common. You may worry about things like your health or your baby's health. If worrying gets in the way of your daily life, treatment can help. Your doctor may recommend counseling and self-care. You may take medicines.  If you have any of these symptoms during or after pregnancy and they last longer than 2 weeks, you may have anxiety. Talk to your doctor.        Changes in your emotions, including:   Not being able to stop worrying.  Feeling nervous or on edge.  Not being able to concentrate.  Feeling irritable or restless.        Physical changes, including:   Trouble sleeping.  Extreme tiredness (fatigue).  Headaches or tense muscles.  Nausea or stomach pain.    Try to go to all of your counseling sessions.       Take your medicines exactly as your doctor tells you.       Eat a variety of healthy foods, and get some daily exercise.       Find ways to help you relax, such as deep breathing exercises.       Get as much rest as possible.       Avoid using alcohol, nicotine, and drugs, and talk to your doctor if you need help quitting.       Connect with other people, such as friends or a " "support group. You can also call the Maternal Mental Health Hotline at 9-740-XCY-MAMA (1-305.841.9903) for support.     When should you call for help?   Call 713  anytime you think you may need emergency care. For example, call if:    You feel you can't stop from hurting yourself, your baby, or someone else.   Where to get help 24 hours a day, 7 days a week   If you or someone you know talks about suicide, self-harm, a mental health crisis, a substance use crisis, or any other kind of emotional distress, get help right away. You can:    Call the Suicide and Crisis Lifeline at 317.     Call 8-804-618-TALK (1-320.546.2281).     Text HOME to 592926 to access the Crisis Text Line.   Call your doctor now or seek immediate medical care if:    You are having trouble caring for yourself or your baby.   Watch closely for changes in your health, and be sure to contact your doctor if:    You have new or worse anxiety.     You have been feeling sad, depressed, or hopeless or have lost interest in things that you usually enjoy.     You have problems with your medicines.   Follow-up care is a key part of your treatment and safety. Be sure to make and go to all appointments, and call your doctor if you are having problems. It's also a good idea to know your test results and keep a list of the medicines you take.  Where can you learn more?  Go to https://www.ShipEarly.net/patiented  Enter B395 in the search box to learn more about \"Anxiety During and After Pregnancy: Care Instructions.\"  Current as of: April 30, 2024  Content Version: 14.3    2024 [employer]Dialog.   Care instructions adapted under license by your healthcare professional. If you have questions about a medical condition or this instruction, always ask your healthcare professional. [employer] disclaims any warranty or liability for your use of this information.  Copyright statement\" content=\"For informational purposes only. Not to replace the " advice of your health care provider. Photo: ID [id number]   [employer]  [employer]. Text copyright   2023 [facility]. All rights reserved. Clinically reviewed by [facility]. [employer] [id number] - REV 03/23.

## 2025-01-17 NOTE — PROGRESS NOTES
Anesthesia Post-Partum Follow-Up Note After  Delivery with Epidural    Patient: Phoebe Morris    Patient location: Post-partum floor    Anesthesia type: Epidural    Subjective  Phoebe Morrsi does not complain of pruritis at this time. She denies weakness, denies paresthesia, denies difficulties breathing or voiding, denies nausea or vomiting, and denies headache. She is able to ambulate and tolerates regular diet.     Objective  Respiratory Function (RR / SpO2 / Airway Patency): Satisfactory  Cardiac Function (HR / Rhythm / BP): Satisfactory  Strength and sensation lower extremities: Normal  Epidual site: No signs of infection or inflammation    Most recent vitals  /86   Pulse 81   Temp 36.4  C (97.6  F) (Oral)   Resp 16   LMP 2024   SpO2 98%   Breastfeeding Unknown     Assessment and plan  Phoebe Morris is a 24 year old female  post partum day #2 s/p  delivery with epidural and TAP block for post-operative pain control.    At this time, there is no evidence of adverse side effects associated with anesthetic procedures performed. We encourage the continued use of multimodal analgesic therapy for adequate pain management over the next several days, and if any questions arise regarding anesthetic care, please reach out to the obstetric anesthesiology team.     Thank you for including us in the care of this patient.    Leo Smith MD  Anesthesiology PGY-3  25

## 2025-01-17 NOTE — LACTATION NOTE
This note was copied from a baby's chart.  Consult for:  first time breastfeeding     Infant Name: Luisana    Infant's Primary Care Clinic: [facility]    Delivery Information:  Luisana was born at Gestational Age: 39w1d via   delivery on 2025 12:27 AM     Maternal Health History:  Maternal past medical history, problem list and prior to admission medications reviewed and notable for  Information for the patient's mother:  Phoebe Morirs [[id number]]     Past Medical History:   Diagnosis Date    Obesity    ,   Information for the patient's mother:  Phoebe Morris [[id number]]     Patient Active Problem List   Diagnosis    Family history of diabetes mellitus in father    Nausea/vomiting in pregnancy    Low back pain during pregnancy in second trimester    Encounter for triage in pregnant patient    Encounter for induction of labor    Chorioamnionitis       Maternal Breast Exam:  Phoebe noted breast growth and sensitivity in early pregnancy. She denies any history of breast/chest injury or surgery. Her breasts are soft and symmetrical with bilateral intact, everted nipples. She has been able to hand express colostrum. ?    Infant information: Luisana was AGA at birth and has age appropriate output and weight loss.      Weight Change Since Birth: -5% at 1 day old     Oral exam of baby:  Luisana has normal jaw, normal arched palate, good length of tongue beyond lingual frenulum attachment, extension well beyond gum ridge & organized when sucking on finger.     Feeding History: Phoebe shares that it is difficult to latch Luisana, more on the right side, because she cries when brought to the breast. Once she latches she does well with the feeding. Phoebe has been giving Luisana syringes of expressed milk as well.    Feeding Assessment:  Luisana is crying on arrival to room as she had been awake for a while and the feeding was delayed due to visitors. Phoebe brings  Luisana to breast in cradle hold on the right side. Suggested some positional modifications such as tucking baby's lower arm under the breast to allow her to get closer, and moving to a cross cradle hold to allow for more support and guidance with latching. Luisana is frantic and will not calm to latch. Eventually she calms but then no longer cues to latch. Assisted with moving her to football hold on the right side. Encouraged nose to nipple positioning and bringing lower areola into mouth first before tucking the nipple in. She is able to latch and sustains the latch with a coordinated suck and intermittent swallows. Showed Phoebe how to adjust the latch to deepen it if uncomfortable. After several minutes she releases the breast and is then moved to the left side in cross cradle hold. Luisana again cries and quickly becomes frantic, so encouraged dragging nipple from her nose to upper lip to stimulate her rooting reflex. Phoebe is then able to latch her independently and reports that the latch feels comfortable.    Education:   [x] Expected  feeding patterns in the first few days (pg. 38 of Your Guide to To Postpartum and [place] Care)/ the Second Night  [x] Stages of milk production  [x] Benefits of hand expression of colostrum  [x] Early feeding cues     [x] Benefits of feeding on cue  [x] Benefits of skin to skin  [x] Breastfeeding positions  [x] Tips to get and maintain a deep latch  [x] Nutritive vs.non-nutritive sucking  [x] Gentle breast compressions as needed to enhance milk transfer  [x] How to tell when baby is finished  [x] How to tell if baby is getting enough  [x] Expected  output  [x]  weight loss  [x] Infant Feeding Log  [x] [employer] Breastfeeding/Pumping videos  [x] Signs breastfeeding is going well (comfortable latch, audible swallows, age appropriate output and weight loss)    [x] Pumping recommendations (based on patient need)  [x] Inpatient breastfeeding  support  [x] Outpatient lactation resources    Handouts: Infant Feeding Log (Week 1, Your Guide to Postpartum &  Care Book) and [facility] Lactation Resources    Home Breast Pump: Spectra S1 and wearable Momcozy    Plan: Continue breastfeeding on cue with RN support as needed, goal of 8-12 feedings per day.     Encourage frequent skin to skin and hand expression.     Encouraged follow up with outpatient lactation consultant  as needed after discharge. Family plans to follow up with [facility].      Bessy Lobo RN, IBCLC   Lactation Consultant  Des: [facility] [phone]  Office: [phone]

## 2025-01-17 NOTE — PROGRESS NOTES
[facility]   Post-partum Progress Note    Name:  Phoebe Morris  MRN: [id number]    S: Patient is doing well.  Pain is well controlled. Voiding spontaneously. Tolerating regular diet without nausea or vomiting.  Ambulating without without any issues.  Lochia is within expected limits .  Passing flatus. Planning on breast and formula feeding.  Plans natural cycle tracking for birth control. Denies HA, vision changes, CP, SOB, fevers/chills, RUQ, acute RLE swelling. She is interested in [facility] program and needs a blood pressure cuff. She is okay with lovenox while inpatient.     O:   Patient Vitals for the past 24 hrs:   BP Temp Temp src Pulse Resp SpO2   25 0529 125/81 -- -- 90 -- --   25 0207 (!) 146/91 98  F (36.7  C) Oral 97 16 --   25 2144 122/71 97.7  F (36.5  C) Oral -- 16 98 %   25 1717 107/70 97.7  F (36.5  C) Oral -- -- 99 %   25 1429 99/65 98  F (36.7  C) Oral -- 18 98 %   25 1300 -- -- -- -- 18 98 %   25 1200 -- -- -- -- 18 98 %   25 1140 108/63 97.8  F (36.6  C) Oral -- 18 98 %   25 1100 -- -- -- -- 18 99 %   25 1000 -- -- -- -- 18 98 %   25 0900 -- -- -- -- -- 97 %   25 0855 -- -- -- -- 18 98 %     Gen:  Resting comfortably, NAD  CV:  RR, well perfused  Pulm:  Non-labored breathing on room air  Abd:  Soft, appropriately ttp, non-distended.Fundus at the umbilicus, firm and non-tender.   Incision:  Bandage in place Clean, dry without shadowing. No erythema, drainage or induration   Ext:  non-tender, 1+  edema to bilateral lower extremities    I/O last 3 completed shifts:  In: 2383.33 [P.O.:2000; I.V.:383.33]  Out:  [Urine:]      Assessment/Plan:  Phoebe Morris 24 year old  on POD #1 s/p PLTCS for Category II tracing RFD. Working on goals for discharge home    # Postpartum management  Pain: Well-controlled with ibuprofen, tylenol, and oxycodone PRN   GI:  PRN bowel  regimen, anti-emetics  : Voiding spontaneously  Hgb: Hgb 12> EBL 1250 >10.8  Asymptomatic from acute blood loss anemia secondary to hemorrhage; will discharge with oral iron if <10.   Rh: Positive  Rubella: immune  Feed: Breast and formula feeding  Infant:  Stable, at bedside   BC: Declines, plans natural cycle tracking. Discussed recommended pregnancy spacing of 18 months.  PPx:  Encourage ambulation, IS, SCDs while confined to bed    #Triple I/Endometritis   -Tl 100.5 (1/16 0130). Has remained afebrile since   - s/p antibiotics     # Ghtn  - HELLP pending   - One mild range blood pressure overnight. No symptoms of PreE  - [facility] program for discharge    Medically Ready for Discharge: Anticipated Tomorrow     Brandy Paz MD MPH  Obstetric & Gynecology, PGY-2  January 17, 2025 , 8:31 AM        Physician Attestation   I, Paty Hooper, personally saw and examined Phoebe.    I appreciate the note by Dr. Paz.   I saw and evaluated the patient separately and agree with the findings and plan of care as documented in the note. I have reviewed her vitals, labs, and medications. I have discussed the plan of care with the resident.   She is doing well. Has no questions regarding delivery.  Overall BP's have been normal range.  HELLP labs normal. WBC elevated at 17.9 but clinically she has no uterine tenderness. She will continue to work on breast feeding and plan for discharge in 1-2 days.   Hemoglobin   Date Value Ref Range Status   01/17/2025 11.1 (L) 11.7 - 15.7 g/dL Final   01/16/2025 10.8 (L) 11.7 - 15.7 g/dL Final   12/13/2020 15.4 11.7 - 15.7 g/dL Final   12/07/2017 13.7 11.7 - 15.7 g/dL Final      Liver Function Studies -   Recent Labs   Lab Test 01/17/25  0821 01/02/25  0940 11/16/24  2140   PROTTOTAL  --   --  6.3*   ALBUMIN  --   --  3.4*   BILITOTAL  --   --  0.4   ALKPHOS  --   --  139   AST 19   < > 9   ALT 7   < > 9    < > = values in this interval not displayed.      We discussed routine post op/ pp  cares and normal expectations for recovery.      Paty Hooper MD  Date of Service (when I saw the patient): January 17, 2025

## 2025-01-17 NOTE — PLAN OF CARE
Goal Outcome Evaluation:  Shift [id number]  Afebrile. VSS, except 0-5/10. Fundus U2, scant, rubra lochia. LS clear on RA. Good PO intake, good appetite. Denies PreE Symptoms at this time.  Incision site is open to air with steri strips in place. Patient is using belly band and tolerating well. Voiding independently, passing gas. Taking IBU and tylenol as needed. Showered this shift. Bonding well with infant in room. Helping with infant cares. Patient is breast feeding, intermittently pumping followed by hand expressing every 2-3 hours. Videos completed this shift. Birth certificate and ROP previously completed. Has a pump at home and saw lactation today. Hourly monitoring completed.     Problem: Adult Inpatient Plan of Care  Goal: Absence of Hospital-Acquired Illness or Injury  Intervention: Prevent Skin Injury  Recent Flowsheet Documentation  Taken 1/17/2025 0828 by Elsi Rivas RN  Body Position: position changed independently  Intervention: Prevent and Manage VTE (Venous Thromboembolism) Risk  Recent Flowsheet Documentation  Taken 1/17/2025 0828 by Elsi Rivas RN  VTE Prevention/Management: (fluids and ambulation promoted) other (see comments)  Intervention: Prevent Infection  Recent Flowsheet Documentation  Taken 1/17/2025 0828 by Elsi Rivas RN  Infection Prevention:   cohorting utilized   hand hygiene promoted   personal protective equipment utilized   rest/sleep promoted  Goal: Optimal Comfort and Wellbeing  Intervention: Monitor Pain and Promote Comfort  Recent Flowsheet Documentation  Taken 1/17/2025 0808 by Elsi Rivas RN  Pain Management Interventions: medication (see MAR)  Intervention: Provide Person-Centered Care  Recent Flowsheet Documentation  Taken 1/17/2025 0828 by Elsi Rivas RN  Trust Relationship/Rapport:   care explained   choices provided   emotional support provided   empathic listening provided   questions answered   questions encouraged   reassurance provided      Problem: Adult Inpatient Plan of Care  Goal: Plan of Care Review  Description: The Plan of Care Review/Shift note should be completed every shift.  The Outcome Evaluation is a brief statement about your assessment that the patient is improving, declining, or no change.  This information will be displayed automatically on your shift  note.  Outcome: Progressing  Goal: Absence of Hospital-Acquired Illness or Injury  Intervention: Prevent Skin Injury  Recent Flowsheet Documentation  Taken 1/17/2025 0828 by Elsi Rivas RN  Body Position: position changed independently  Intervention: Prevent and Manage VTE (Venous Thromboembolism) Risk  Recent Flowsheet Documentation  Taken 1/17/2025 0828 by Elsi Rivas RN  VTE Prevention/Management: (fluids and ambulation promoted) other (see comments)  Intervention: Prevent Infection  Recent Flowsheet Documentation  Taken 1/17/2025 0828 by Elsi Rivas RN  Infection Prevention:   cohorting utilized   hand hygiene promoted   personal protective equipment utilized   rest/sleep promoted  Goal: Optimal Comfort and Wellbeing  Outcome: Progressing  Intervention: Monitor Pain and Promote Comfort  Recent Flowsheet Documentation  Taken 1/17/2025 0808 by Elsi Rivas RN  Pain Management Interventions: medication (see MAR)  Intervention: Provide Person-Centered Care  Recent Flowsheet Documentation  Taken 1/17/2025 0828 by Elsi Rivas RN  Trust Relationship/Rapport:   care explained   choices provided   emotional support provided   empathic listening provided   questions answered   questions encouraged   reassurance provided  Goal: Readiness for Transition of Care  Outcome: Progressing

## 2025-01-17 NOTE — PROGRESS NOTES
Phoebe requesting discharge. VSS.  Dr. Peñaloza notified and agrees with pt's  request.  Discharge instructions reviewed. Pt discharged with  in arms and FOB with transport.

## 2025-01-18 LAB
BACTERIA SPEC CULT: ABNORMAL
BACTERIA SPEC CULT: NO GROWTH
ERYTHROCYTE [DISTWIDTH] IN BLOOD BY AUTOMATED COUNT: 13.2 % (ref 10–15)
HCT VFR BLD AUTO: 30.5 % (ref 35–47)
HGB BLD-MCNC: 10.3 G/DL (ref 11.7–15.7)
HOLD SPECIMEN: NORMAL
MCH RBC QN AUTO: 29 PG (ref 26.5–33)
MCHC RBC AUTO-ENTMCNC: 33.8 G/DL (ref 31.5–36.5)
MCV RBC AUTO: 86 FL (ref 78–100)
PLATELET # BLD AUTO: 212 10E3/UL (ref 150–450)
RBC # BLD AUTO: 3.55 10E6/UL (ref 3.8–5.2)
WBC # BLD AUTO: 13.3 10E3/UL (ref 4–11)

## 2025-01-18 PROCEDURE — 250N000013 HC RX MED GY IP 250 OP 250 PS 637

## 2025-01-18 PROCEDURE — 250N000011 HC RX IP 250 OP 636

## 2025-01-18 PROCEDURE — 36415 COLL VENOUS BLD VENIPUNCTURE: CPT

## 2025-01-18 PROCEDURE — 85041 AUTOMATED RBC COUNT: CPT

## 2025-01-18 PROCEDURE — 120N000002 HC R&B MED SURG/OB UMMC

## 2025-01-18 PROCEDURE — 85018 HEMOGLOBIN: CPT

## 2025-01-18 RX ORDER — POLYETHYLENE GLYCOL 3350 17 G/17G
17 POWDER, FOR SOLUTION ORAL 2 TIMES DAILY
Status: DISCONTINUED | OUTPATIENT
Start: 2025-01-18 | End: 2025-01-19 | Stop reason: HOSPADM

## 2025-01-18 RX ORDER — OXYCODONE HYDROCHLORIDE 5 MG/1
5 TABLET ORAL EVERY 4 HOURS PRN
Qty: 18 TABLET | Refills: 0 | Status: SHIPPED | OUTPATIENT
Start: 2025-01-18

## 2025-01-18 RX ORDER — CYCLOBENZAPRINE HCL 5 MG
5 TABLET ORAL 3 TIMES DAILY
Status: DISCONTINUED | OUTPATIENT
Start: 2025-01-18 | End: 2025-01-19 | Stop reason: HOSPADM

## 2025-01-18 RX ORDER — HYDROXYZINE HYDROCHLORIDE 25 MG/1
50 TABLET, FILM COATED ORAL 3 TIMES DAILY PRN
Status: DISCONTINUED | OUTPATIENT
Start: 2025-01-18 | End: 2025-01-19 | Stop reason: HOSPADM

## 2025-01-18 RX ORDER — DOCOSANOL 100 MG/G
CREAM TOPICAL
Status: DISCONTINUED | OUTPATIENT
Start: 2025-01-18 | End: 2025-01-19 | Stop reason: HOSPADM

## 2025-01-18 RX ORDER — HYDROXYZINE HYDROCHLORIDE 25 MG/1
25 TABLET, FILM COATED ORAL 3 TIMES DAILY PRN
Status: DISCONTINUED | OUTPATIENT
Start: 2025-01-18 | End: 2025-01-19 | Stop reason: HOSPADM

## 2025-01-18 RX ADMIN — HYDROXYZINE HYDROCHLORIDE 25 MG: 25 TABLET, FILM COATED ORAL at 14:45

## 2025-01-18 RX ADMIN — CYCLOBENZAPRINE HYDROCHLORIDE 5 MG: 5 TABLET, FILM COATED ORAL at 20:20

## 2025-01-18 RX ADMIN — IBUPROFEN 800 MG: 800 TABLET, FILM COATED ORAL at 20:00

## 2025-01-18 RX ADMIN — IBUPROFEN 800 MG: 800 TABLET, FILM COATED ORAL at 08:49

## 2025-01-18 RX ADMIN — OXYCODONE 5 MG: 5 TABLET ORAL at 11:19

## 2025-01-18 RX ADMIN — SENNOSIDES AND DOCUSATE SODIUM 2 TABLET: 50; 8.6 TABLET ORAL at 07:20

## 2025-01-18 RX ADMIN — IBUPROFEN 800 MG: 800 TABLET, FILM COATED ORAL at 14:45

## 2025-01-18 RX ADMIN — DOCOSANOL: 100 CREAM TOPICAL at 14:46

## 2025-01-18 RX ADMIN — ACETAMINOPHEN 975 MG: 325 TABLET, FILM COATED ORAL at 23:55

## 2025-01-18 RX ADMIN — OXYCODONE 5 MG: 5 TABLET ORAL at 23:55

## 2025-01-18 RX ADMIN — ENOXAPARIN SODIUM 40 MG: 40 INJECTION SUBCUTANEOUS at 11:19

## 2025-01-18 RX ADMIN — DOCOSANOL: 100 CREAM TOPICAL at 19:27

## 2025-01-18 RX ADMIN — CYCLOBENZAPRINE HYDROCHLORIDE 5 MG: 5 TABLET, FILM COATED ORAL at 14:45

## 2025-01-18 RX ADMIN — ACETAMINOPHEN 975 MG: 325 TABLET, FILM COATED ORAL at 12:02

## 2025-01-18 RX ADMIN — OXYCODONE 5 MG: 5 TABLET ORAL at 15:20

## 2025-01-18 RX ADMIN — OXYCODONE 5 MG: 5 TABLET ORAL at 07:20

## 2025-01-18 RX ADMIN — DOCOSANOL: 100 CREAM TOPICAL at 09:33

## 2025-01-18 RX ADMIN — OXYCODONE 5 MG: 5 TABLET ORAL at 19:28

## 2025-01-18 RX ADMIN — DOCOSANOL: 100 CREAM TOPICAL at 23:56

## 2025-01-18 RX ADMIN — ACETAMINOPHEN 975 MG: 325 TABLET, FILM COATED ORAL at 05:57

## 2025-01-18 RX ADMIN — IBUPROFEN 800 MG: 800 TABLET, FILM COATED ORAL at 01:48

## 2025-01-18 RX ADMIN — CYCLOBENZAPRINE HYDROCHLORIDE 5 MG: 5 TABLET, FILM COATED ORAL at 09:34

## 2025-01-18 RX ADMIN — ENOXAPARIN SODIUM 40 MG: 40 INJECTION SUBCUTANEOUS at 23:55

## 2025-01-18 RX ADMIN — SENNOSIDES AND DOCUSATE SODIUM 2 TABLET: 50; 8.6 TABLET ORAL at 20:00

## 2025-01-18 RX ADMIN — BISACODYL 10 MG: 10 SUPPOSITORY RECTAL at 05:19

## 2025-01-18 RX ADMIN — ACETAMINOPHEN 975 MG: 325 TABLET, FILM COATED ORAL at 18:03

## 2025-01-18 RX ADMIN — OXYCODONE 5 MG: 5 TABLET ORAL at 02:58

## 2025-01-18 ASSESSMENT — ACTIVITIES OF DAILY LIVING (ADL)
ADLS_ACUITY_SCORE: 22
ADLS_ACUITY_SCORE: 21
ADLS_ACUITY_SCORE: 22
ADLS_ACUITY_SCORE: 21
ADLS_ACUITY_SCORE: 22
ADLS_ACUITY_SCORE: 21
ADLS_ACUITY_SCORE: 21
ADLS_ACUITY_SCORE: 22
ADLS_ACUITY_SCORE: 21
ADLS_ACUITY_SCORE: 22
ADLS_ACUITY_SCORE: 21
ADLS_ACUITY_SCORE: 22

## 2025-01-18 NOTE — PROGRESS NOTES
Post Partum Progress Note  POD#2    Subjective:  Doing okay this morning. Continues to have rib pain as well as generalized abdominal discomfort that she attributes to being constipated. Did have a BM earlier, hoping to have another one this morning. Incisional pain generally controlled with meds. Ambulating without dizziness but she does note decreased exercise tolerance, wonders if it's from the infection she had in labor. Tolerating PO without nausea/vomiting. Voiding spontaneously. Working on breastfeeding. No HA, vision changes, SOB, chest pain, RUQ pain. Would like to go home tomorrow.     Objective:  Vitals:    25 0529 25 0828 25 1607 25 0010   BP: 125/81 136/86 126/82 127/86   BP Location: Left arm   Left arm   Patient Position: Semi-Elias's   Semi-Elias's   Cuff Size: Adult Regular   Adult Regular   Pulse: 90 81 78 81   Resp:  16 18 16   Temp:  97.6  F (36.4  C) 97.7  F (36.5  C) 98.3  F (36.8  C)   TempSrc:  Oral Oral Oral   SpO2:           General: NAD. A&Ox3.  CV: Regular rate, well perfused.   Pulm: Normal respiratory effort.  Abd: Soft, non-tender, non-distended. Fundus is firm and below the umbilicus.    Incision:  incision is clean, dry, intact  Ext: 1+ lower extremity edema bilaterally. No calf tenderness.    Assessment/Plan:  Phoebe Morris 24 year old  on POD#2 s/p PLTCS for Category II tracing RFD. Still working on breastfeeding and pain control, will discharge likely POD#3.      # Postpartum management  Pain:     Well-controlled with ibuprofen, tylenol, and oxycodone PRN   GI:         PRN bowel regimen, anti-emetics  :Voiding spontaneously  Hgb:      Hgb 12> EBL 1250 >10.8  Asymptomatic from acute blood loss anemia secondary to hemorrhage.  Rh: Positive  Rubella: immune  Feed:    Breast and formula feeding  Infant:   Stable, at bedside   BC: Declines, plans natural cycle tracking. Discussed recommended pregnancy spacing of 18 months.  PPx:      Encourage  ambulation, IS, SCDs while confined to bed     #Triple I/Endometritis   -Tl 100.5 (1/16 0130). Has remained afebrile since   - s/p antibiotics   - AM CBC pending       # gHTN  - HELLP wnl  - Largely normotensive in the postpartum period, does not require medications  - No s/sx preeclampsia    - Discharge with [facility]       Dispo: Medically Ready for Discharge: Anticipated Tomorrow    Crystal Reyna DO, PGY-2  [facility]   January 18, 2025 8:36 AM    Patient seen and examined by me, agree with above resident note. Overall doing ok, but still struggling with some pain control and breastfeeding.  Was able to have BM yesterday, still feeling constipated.  Passing flatus and denies n/v.  Discussed.  Discussed breastfeeding.  Plan to cont routine cares today and likely discharge in am.    MIRZA SIU MD

## 2025-01-18 NOTE — PLAN OF CARE
Goal Outcome Evaluation:      Plan of Care Reviewed With: patient    Overall Patient Progress: improvingOverall Patient Progress: improving     VSS and postpartum assessments WDL.  Up ad ciera with steady gait and independent with cares.  Bonding well with infant. Pumping every 3 hours overnight due to nipple pain with latch. Pumping around 10mls.  Pain managed with tylenol, ibuprofen, oxycodone, and simethicone. Complaining most of gas pain. Encouraged to walk around unit and drink fluids today. Partner Devyn present and supportive.  Will continue with postpartum cares and education per plan of care.

## 2025-01-18 NOTE — PLAN OF CARE
Goal Outcome Evaluation: VSS. Postpartum checks WDL. Incision JULISA with steri-strips in place. Up independently, voiding without difficulty. Passing gas, had BM today. Has miralax ordered but declining at this time. Feeling slightly less constipated. Abdomen soft and bowel sounds present. Denies n/v. Pain managed with tylenol, ibuprofen, and oxycodone. Breastfeeding, pumping, and bottle feeding infant.

## 2025-01-18 NOTE — PROGRESS NOTES
Post Partum Progress Note  POD#3    Subjective:  Patient reports doing well physically but mentally feeling overwhelmed by what she describes as a very traumatic birth experience. She feels overwhelmed with visitors in her room along with baby crying. She has questions regarding what she can do at home to cope with these feelings. She does feel safe at home and has a good support system in place.     Pain control is adequate. She is ambulating without dizziness. Tolerating PO without nausea/vomiting. Voiding spontaneously. She is passing flatus and has had a BM. Working on breastfeeding. No HA, vision changes, SOB, chest pain, RUQ pain. Would like to go home today.    Objective:  Vitals:    25 1051 25 1803 25 2355 25 0709   BP:  122/74 133/82    BP Location:  Left arm Right arm    Patient Position:  Semi-Elias's Semi-Elias's    Cuff Size:  Adult Regular Adult Regular    Pulse:  92 74    Resp:  18 16    Temp:  98.6  F (37  C) 97.9  F (36.6  C)    TempSrc:  Oral Oral    SpO2:       Weight: 107.8 kg (237 lb 9.6 oz)   108.1 kg (238 lb 4.8 oz)     General: NAD, A&Ox3.  CV: Regular rate, well perfused.   Pulm: Normal respiratory effort.  Abd: Soft, non-tender, non-distended. Fundus is firm and below the umbilicus.    Incision:  incision is clean, dry, intact    Assessment/Plan:  Phoebe Morris 24 year old  on POD#3 s/p PLTCS for Category II tracing RFD. She is meeting all goals and plans to discharge home today.      # Postpartum management  Pain:     Well-controlled with ibuprofen, tylenol, and oxycodone PRN   GI:         PRN bowel regimen, anti-emetics  :    Voiding spontaneously  Hgb:      Hgb 12> EBL 1250 >10.7>10.8>11.1>10.3.  Rh: Positive, Rhogam not indicated  Rubella: Immune, MMR not indicated  Feed:    Breast and formula feeding  Infant:   Stable, at bedside   BC: Declines, plans natural cycle tracking. Discussed recommended pregnancy spacing of 18 months.  PPx:       Encourage ambulation, IS, SCDs while confined to bed     #Triple I/Endometritis   -Tl 100.5 ( 0130). Has remained afebrile since   - s/p antibiotics   - WBC 17.2>18.3>17.9>13.3, AM CBC pending      # gHTN  - HELLP wnl   - BP normotensive over the last 24 hours  - No s/sx preeclampsia    - Enroll in [facility] upon discharge    # Anxiety  # Postpartum Blues  - Discussed coping strategies and when to reach out for help. Discussed what resources are available to her and made a safe discharge plan.   - D#1 Zoloft 25 mg daily  -  consult     Dispo: Medically Ready for Discharge: Anticipated Today    Josiane Lobo MD PGY1  [facility]  Obstetrics, Gynecology, & Women's Health      Physician Attestation   I, Paty Hooper, personally saw and examined Phoebe.    I appreciate the note by Dr. Lobo.   I saw and evaluated the patient separately and agree with the findings and plan of care as documented in the note. I have reviewed her vitals, labs, and medications. I have discussed the plan of care with the resident.   She is going home today. Nurse alerted me that her EDS was 19. Patient verbalized that she is still processing her birth experience and that so many people were in the OR when she went back for urgent  section.  When her whole family came to visit her yesterday it was like PTSD reminding her of when she was in the OR with so many people and a lot of commotion. Feels like she is still processing everything.  We discussed all of this and identified what triggers her feelings of sadness or anxiety.  We also discussed several options for stress relief and methods that will help her relax.  She will have the support of her  at home for 6 wks and also her mother and sister for another week.  She identifies this as her support network.  I will make a referral to [facility] at our clinic also to help with therapy.  She started zoloft today 25 mg and will increase that after  10-14 days to 50 mg prn.    Patient felt safe and ready to discharge today.   Hemoglobin   Date Value Ref Range Status   01/19/2025 9.7 (L) 11.7 - 15.7 g/dL Final   01/18/2025 10.3 (L) 11.7 - 15.7 g/dL Final   12/13/2020 15.4 11.7 - 15.7 g/dL Final   12/07/2017 13.7 11.7 - 15.7 g/dL Final   We discussed routine post op/ pp cares and normal expectations for recovery.      Medically Ready for Discharge: Ready Now        Medication List        Started      acetaminophen 325 MG tablet  Commonly known as: TYLENOL  650 mg, Oral, EVERY 6 HOURS PRN, Start after Delivery.     ferrous gluconate 324 (38 Fe) MG tablet  Commonly known as: FERGON  324 mg, Oral, DAILY WITH BREAKFAST     ibuprofen 600 MG tablet  Commonly known as: ADVIL/MOTRIN  600 mg, Oral, EVERY 6 HOURS PRN, Start after delivery     oxyCODONE 5 MG tablet  Commonly known as: ROXICODONE  5 mg, Oral, EVERY 4 HOURS PRN     polyethylene glycol 17 GM/Dose powder  Commonly known as: MIRALAX  1 Capful, Oral, DAILY     senna-docusate 8.6-50 MG tablet  Commonly known as: SENOKOT-S/PERICOLACE  1 tablet, Oral, DAILY, Start after delivery.     sertraline 25 MG tablet  Commonly known as: ZOLOFT  25 mg, Oral, DAILY  Start taking on: January 20, 2025                Paty Hooper MD  Date of Service (when I saw the patient): January 19, 2025

## 2025-01-19 VITALS
HEART RATE: 87 BPM | WEIGHT: 238.3 LBS | BODY MASS INDEX: 40.9 KG/M2 | DIASTOLIC BLOOD PRESSURE: 87 MMHG | TEMPERATURE: 97.8 F | RESPIRATION RATE: 18 BRPM | SYSTOLIC BLOOD PRESSURE: 139 MMHG | OXYGEN SATURATION: 98 %

## 2025-01-19 LAB
ERYTHROCYTE [DISTWIDTH] IN BLOOD BY AUTOMATED COUNT: 13.3 % (ref 10–15)
HCT VFR BLD AUTO: 29 % (ref 35–47)
HGB BLD-MCNC: 9.7 G/DL (ref 11.7–15.7)
MCH RBC QN AUTO: 29.1 PG (ref 26.5–33)
MCHC RBC AUTO-ENTMCNC: 33.4 G/DL (ref 31.5–36.5)
MCV RBC AUTO: 87 FL (ref 78–100)
PLATELET # BLD AUTO: 243 10E3/UL (ref 150–450)
RBC # BLD AUTO: 3.33 10E6/UL (ref 3.8–5.2)
WBC # BLD AUTO: 9.1 10E3/UL (ref 4–11)

## 2025-01-19 PROCEDURE — 250N000013 HC RX MED GY IP 250 OP 250 PS 637

## 2025-01-19 PROCEDURE — 36415 COLL VENOUS BLD VENIPUNCTURE: CPT

## 2025-01-19 PROCEDURE — 85027 COMPLETE CBC AUTOMATED: CPT

## 2025-01-19 RX ORDER — POLYETHYLENE GLYCOL 3350 17 G/17G
1 POWDER, FOR SOLUTION ORAL DAILY
Qty: 510 G | Refills: 0 | Status: SHIPPED | OUTPATIENT
Start: 2025-01-19

## 2025-01-19 RX ORDER — SERTRALINE HYDROCHLORIDE 25 MG/1
25 TABLET, FILM COATED ORAL DAILY
Status: DISCONTINUED | OUTPATIENT
Start: 2025-01-19 | End: 2025-01-19 | Stop reason: HOSPADM

## 2025-01-19 RX ORDER — SERTRALINE HYDROCHLORIDE 25 MG/1
25 TABLET, FILM COATED ORAL DAILY
Qty: 60 TABLET | Refills: 0 | Status: SHIPPED | OUTPATIENT
Start: 2025-01-20

## 2025-01-19 RX ORDER — FERROUS GLUCONATE 324(38)MG
324 TABLET ORAL
Qty: 60 TABLET | Refills: 0 | Status: SHIPPED | OUTPATIENT
Start: 2025-01-19

## 2025-01-19 RX ORDER — CYCLOBENZAPRINE HCL 10 MG
10 TABLET ORAL 3 TIMES DAILY
Qty: 30 TABLET | Refills: 0 | Status: SHIPPED | OUTPATIENT
Start: 2025-01-19

## 2025-01-19 RX ADMIN — ACETAMINOPHEN 975 MG: 325 TABLET, FILM COATED ORAL at 12:19

## 2025-01-19 RX ADMIN — IBUPROFEN 800 MG: 800 TABLET, FILM COATED ORAL at 08:08

## 2025-01-19 RX ADMIN — DOCOSANOL: 100 CREAM TOPICAL at 12:20

## 2025-01-19 RX ADMIN — IBUPROFEN 800 MG: 800 TABLET, FILM COATED ORAL at 14:13

## 2025-01-19 RX ADMIN — POLYETHYLENE GLYCOL 3350 17 G: 17 POWDER, FOR SOLUTION ORAL at 08:08

## 2025-01-19 RX ADMIN — CYCLOBENZAPRINE HYDROCHLORIDE 5 MG: 5 TABLET, FILM COATED ORAL at 14:14

## 2025-01-19 RX ADMIN — OXYCODONE 5 MG: 5 TABLET ORAL at 04:01

## 2025-01-19 RX ADMIN — OXYCODONE 5 MG: 5 TABLET ORAL at 12:19

## 2025-01-19 RX ADMIN — CYCLOBENZAPRINE HYDROCHLORIDE 5 MG: 5 TABLET, FILM COATED ORAL at 08:09

## 2025-01-19 RX ADMIN — IBUPROFEN 800 MG: 800 TABLET, FILM COATED ORAL at 02:05

## 2025-01-19 RX ADMIN — DOCOSANOL: 100 CREAM TOPICAL at 08:09

## 2025-01-19 RX ADMIN — ACETAMINOPHEN 975 MG: 325 TABLET, FILM COATED ORAL at 06:16

## 2025-01-19 RX ADMIN — OXYCODONE 5 MG: 5 TABLET ORAL at 08:08

## 2025-01-19 RX ADMIN — SENNOSIDES AND DOCUSATE SODIUM 2 TABLET: 50; 8.6 TABLET ORAL at 08:08

## 2025-01-19 RX ADMIN — SERTRALINE HYDROCHLORIDE 25 MG: 25 TABLET, FILM COATED ORAL at 08:52

## 2025-01-19 ASSESSMENT — ACTIVITIES OF DAILY LIVING (ADL)
ADLS_ACUITY_SCORE: 22

## 2025-01-19 NOTE — PLAN OF CARE
Goal Outcome Evaluation:      Plan of Care Reviewed With: patient, spouse    Data: Vital signs within normal limits. Postpartum checks within normal limits - see flow record. Patient eating and drinking normally. Patient able to empty bladder independently and is up ambulating. No apparent signs of infection. Incision healing well. Patient performing self cares and is able to care for infant.  Action: Patient medicated during the shift for pain. See MAR. Patient reassessed within 1 hour after each medication and pain was improved - patient stated she was comfortable. Patient education done about good self care. See flow record.  Response: Positive attachment behaviors observed with infant. Support persons was  present.   Plan: Anticipate discharge.    At start of this RN shift, patient was complaining of pain. Patient was given oxycodone and wanted to rest.   Patient was able to nap well.  Patient stated still having some distention and bloating.   Patient encouraged to walk unit and hydrate.

## 2025-01-19 NOTE — LACTATION NOTE
This note was copied from a baby's chart.  Follow Up Consult    Infant Name: Luisana    Infant's Primary Care Clinic: [facility]    Maternal Assessment: Doing well, some tenderness in nipples bilaterally, using hydrogels. Pumping regularly, today expressing 45 mL + per pumping session       Infant Assessment:  Luisana has age appropriate output and weight loss.      Weight Change Since Birth: -6% at 3 day old      Feeding History: Has been both breast and bottle feeding, becomes very fussy at the breast quickly and struggles to latch       Feeding Assessment: Mother is attempting to latch when LC arrives, baby is fussy and will not latch. Reviewed option for NS, mother agreeable to try, dispensed 20 mm NS. Baby continues to be fussy at the breast, will latch to LC finger but not NS. Tried 24 mm NS and she is more interested and eventually sustains the latch. After a few minutes she comes off the breast and is very fussy again, eventually falls asleep at the breast.   Phoebe pumps and then supplements baby with 30 mL EBM by bottle.   Reviewed suck training with Phoebe and  Christel, encouraged practicing for a few seconds prior to each breastfeeding attempt at Luisana keeps her tongue bunched behind her gums although she can stick it out over the gumline.   Also reviews paced bottle feeding and provided handout.   Encouraged Phoebe to keep time at the breast positive, if tremayne is very fussy and will not latch, move onto bottle and Phoebe can pump. As milk volumes continue to increase and baby is becoming stronger latching will improve.   Also encouraged to follow up with lactation in clinic for support.     Education:   [x] Stages of milk production  [x] Early feeding cues     [x] Breastfeeding positions  [x] Tips to get and maintain a deep latch  [x] How to tell when baby is finished  [x] How to tell if baby is getting enough  [x] Expected  output  [x] Infant Feeding Log  [x] Signs breastfeeding is going  well (comfortable latch, audible swallows, age appropriate output and weight loss)    [x] Pumping recommendations (based on patient need)  [x] [facility] breast pump part/infant feeding supplies cleaning recommendations  [x] Inpatient breastfeeding support  [x] Outpatient lactation resources    Handouts: How to Keep Your Breast Pump Clean ([facility]), Infant Feeding Log (Week 1, Your Guide to Postpartum & [place] Care Book), [facility] Lactation Resources, Storage and Preparation of Breast Milk ([facility]), and Paced Bottle Feeding ([facility])    Home Breast Pump: Has Spectra at home    Plan: Continue breastfeeding on cue with a goal of 8-12 feedings per day. Encourage frequent skin to skin, breast massage and hand expression.     Encouraged follow up with outpatient lactation consultant  within 1 week after discharge. [facility]       Aurora Tijerina RN, IBCLC   Lactation Consultant  Des: [facility] [phone]  Office: [phone]

## 2025-01-19 NOTE — PLAN OF CARE
Goal Outcome Evaluation: VSS. Postpartum checks WDL. Incision JULISA with steri-strips in place. Up independently, voiding without difficulty. Passing gas, had another BM today. Pain managed with tylenol, ibuprofen, oxycodone, and flexeril. Breastfeeding occasionally but mostly pumping and bottle feeding infant. EDS score repeated d/t verbalized concerns with anxiety, rescreen score of 19- Dr. Hooper and SW notified and met with pt.     Pt discharged to home with family. Discharge instructions given and all questions answered. Discharge medications given and instructed on use. Home BP cuff given and instructed on use. Pt to follow-up in clinic within 1 week for BP check. Pt enrolled in [facility] BP program at discharge.

## 2025-01-19 NOTE — PLAN OF CARE
Data: Vital signs within normal limits. Postpartum checks within normal limits - see flow record. Patient eating and drinking normally. Patient able to empty bladder independently and is up ambulating. No apparent signs of infection. Incision healing well. Patient performing self cares and is able to care for infant.  Action: Patient medicated during the shift for pain. See MAR. Patient reassessed within 1 hour after each medication and pain was improved - patient stated she was comfortable. Patient education done about rest and analgesics. See flow record.  Response: Positive attachment behaviors observed with infant. Support persons are present.   Plan: Anticipate discharge on 1/19/25.Goal Outcome Evaluation:progressing

## 2025-01-19 NOTE — CONSULTS
Social Work Progress Note      On-call SW received consult for pt's concerns about feeling overwhelmed. Per Provider note, pt feels safe discharging home and has a support system in place. Pt was going to start mental health medication prior to discharging from hospital. On-call SW connected with RN about completing EDS as well. Pt was breastfeeding at that moment.     On-call SW gathered postpartum resources and created a packet for pt. SW went to meet with pt and connected with RN. RN shared that pt's EDS was 19 and 0 for question 10, but pt was currently meeting with Provider. SW provided RN with resources to hand-off to pt, and SW would review with pt over the phone.     SW called pt's room, with no answer.   SW received message from RN that pt would be discharging soon.   SW called pt's cell phone, with no answer.   SW called pt's room again, with no answer.     SW placed recommendations in pt's After Visit Summary to review to assist with pt's postpartum concerns.     Add 3:15 pm   On-call SW connected with RN who shared that pt was getting ready to discharge, but SW could connect. SW met with pt and pt's partner at bedside, reviewed reason for visit. Pt shared that she is feeling overwhelmed. SW reviewed with pt resources for therapy in the community, utilizing OB/PCP for referrals, utilizing pt's support system, which pt could name 5+ people who are in her support system. SW acknowledged with pt the overwhelming responsibility of having a child and managing realistic expectations for self. SW reviewed plan with pt for discharge and pt was agreeable that she felt safe going home.     Yanci Pickard, MSW, LICSW  Peds On-call   [facility]  Weekend Social Work can be reached via Vocera at:  Weekend Daytime Vocera Group: Peds SW Weekend Onsite [phone]  After Hours Vocera Group: Peds SW After Hours On Call [phone]

## 2025-01-20 ENCOUNTER — PATIENT OUTREACH (OUTPATIENT)
Dept: CARE COORDINATION | Facility: CLINIC | Age: 25
End: 2025-01-20
Payer: COMMERCIAL

## 2025-01-20 ENCOUNTER — TELEPHONE (OUTPATIENT)
Dept: NURSING | Facility: CLINIC | Age: 25
End: 2025-01-20
Payer: COMMERCIAL

## 2025-01-20 ENCOUNTER — TELEPHONE (OUTPATIENT)
Dept: MATERNAL FETAL MEDICINE | Facility: CLINIC | Age: 25
End: 2025-01-20
Payer: COMMERCIAL

## 2025-01-20 DIAGNOSIS — O21.9 NAUSEA/VOMITING IN PREGNANCY: Primary | ICD-10-CM

## 2025-01-20 DIAGNOSIS — O26.892 LOW BACK PAIN DURING PREGNANCY IN SECOND TRIMESTER: ICD-10-CM

## 2025-01-20 DIAGNOSIS — M54.50 LOW BACK PAIN DURING PREGNANCY IN SECOND TRIMESTER: ICD-10-CM

## 2025-01-20 NOTE — TELEPHONE ENCOUNTER
Home Observation of Postpartum Elevated BP (HOPE-BP) Program     Phoebe Morris enrolled in the HOPE-BP Program on 1/19/2025, 1 days ago. Delivered on 1/16/2025. Diagnosis: Gestational hypertension. Medication(s) prescribed:  no meds .  Patient reported the following blood pressures in the past 72 hours:        No data to display              Spoke with patient introducing program and contact information. Pt denies s/sx of HTN or preeclampsia at this time. Pt encouraged to check BP twice a day and record it in MyChart. Pt verbalized understanding.    CLAUDE ARDON RN

## 2025-01-20 NOTE — TELEPHONE ENCOUNTER
----- Message from Paty Hooper sent at 1/19/2025  3:47 PM CST -----  Regarding: PT APPT  Can you please call this patient and help her make an appt with our new [facility] provider.  She was discharged home today after delivery and was struggling with postpartum depressed mood.  I told her we would refer her to S.  Thank you,   Paty Hooper MD

## 2025-01-20 NOTE — PROGRESS NOTES
[facility]    Background: Transitional Care Management program identified per system criteria and reviewed by [facility] team for possible outreach.    Assessment: Upon chart review, [facility] Team member will not proceed with patient outreach related to this episode of Transitional Care Management program due to reason below:    Patient has active communication with a nurse, provider or care team for reason of post-hospital follow up plan.  Outreach call by [facility] team not indicated to minimize duplicative efforts.     Patient is in active communication today with a Registered Nurse from [facility]. No W outreach call needed at this time.    Plan: Transitional Care Management episode addressed appropriately per reason noted above.      YASHIRA Chisholm  [phone]  [facility]     *Connected Care Resource Team does NOT follow patient ongoing. Referrals are identified based on internal discharge reports and the outreach is to ensure patient has an understanding of their discharge instructions.

## 2025-01-21 DIAGNOSIS — F41.8 POSTPARTUM ANXIETY: Primary | ICD-10-CM

## 2025-01-21 RX ORDER — HYDROXYZINE PAMOATE 25 MG/1
25 CAPSULE ORAL 3 TIMES DAILY PRN
Qty: 90 CAPSULE | Refills: 1 | Status: SHIPPED | OUTPATIENT
Start: 2025-01-21

## 2025-01-21 ASSESSMENT — PATIENT HEALTH QUESTIONNAIRE - PHQ9
SUM OF ALL RESPONSES TO PHQ QUESTIONS 1-9: 3
SUM OF ALL RESPONSES TO PHQ QUESTIONS 1-9: 3
10. IF YOU CHECKED OFF ANY PROBLEMS, HOW DIFFICULT HAVE THESE PROBLEMS MADE IT FOR YOU TO DO YOUR WORK, TAKE CARE OF THINGS AT HOME, OR GET ALONG WITH OTHER PEOPLE: SOMEWHAT DIFFICULT

## 2025-01-21 ASSESSMENT — ANXIETY QUESTIONNAIRES
5. BEING SO RESTLESS THAT IT IS HARD TO SIT STILL: SEVERAL DAYS
2. NOT BEING ABLE TO STOP OR CONTROL WORRYING: NEARLY EVERY DAY
1. FEELING NERVOUS, ANXIOUS, OR ON EDGE: NEARLY EVERY DAY
GAD7 TOTAL SCORE: 17
GAD7 TOTAL SCORE: 17
8. IF YOU CHECKED OFF ANY PROBLEMS, HOW DIFFICULT HAVE THESE MADE IT FOR YOU TO DO YOUR WORK, TAKE CARE OF THINGS AT HOME, OR GET ALONG WITH OTHER PEOPLE?: VERY DIFFICULT
4. TROUBLE RELAXING: NEARLY EVERY DAY
GAD7 TOTAL SCORE: 17
6. BECOMING EASILY ANNOYED OR IRRITABLE: NEARLY EVERY DAY
7. FEELING AFRAID AS IF SOMETHING AWFUL MIGHT HAPPEN: SEVERAL DAYS
7. FEELING AFRAID AS IF SOMETHING AWFUL MIGHT HAPPEN: SEVERAL DAYS
IF YOU CHECKED OFF ANY PROBLEMS ON THIS QUESTIONNAIRE, HOW DIFFICULT HAVE THESE PROBLEMS MADE IT FOR YOU TO DO YOUR WORK, TAKE CARE OF THINGS AT HOME, OR GET ALONG WITH OTHER PEOPLE: VERY DIFFICULT
3. WORRYING TOO MUCH ABOUT DIFFERENT THINGS: NEARLY EVERY DAY

## 2025-01-21 NOTE — PROGRESS NOTES
[facility]: Integrated Behavioral Health    Integrated Behavioral Health   Mental Health & Addiction Services      Progress Note - Initial [facility] Visit     Patient Name: Phoebe Morris    Date: 2025  Service Type: Individual   Visit Start Time: 9:01 AM  Visit End Time:   9:28 AM    Attendees: Patient   Service Modality: Video Visit:      Provider verified identity through the following two step process.  Patient provided:  Patient  and Patient address    Telemedicine Visit: The patient's condition can be safely assessed and treated via synchronous audio and visual telemedicine encounter.      Reason for Telemedicine Visit: Patient has requested telehealth visit    Originating Site (Patient Location): Patient's home    [place] [facility] [place]    Consent:  The patient/guardian has verbally consented to: the potential risks and benefits of telemedicine (video visit) versus in person care; bill my insurance or make self-payment for services provided; and responsibility for payment of non-covered services.     Patient would like the video invitation sent by:  My Chart    Mode of Communication:  Video Conference via [facility]    Distant Location (Provider):  On-site    As the provider I attest to compliance with applicable laws and regulations related to telemedicine.     [facility] Visit Activities (Refresh list every visit): NEW         DATA:     Interactive Complexity: No   Crisis: No     Assessments completed prior to this visit:     The following assessments were completed by patient for this visit:  PHQ2:       2025     9:21 AM 2025    10:21 AM 2024     4:48 PM 1/10/2024     8:15 AM 2023     9:21 AM 3/1/2021     1:04 PM 9/3/2019    10:59 AM   PHQ-2 (  [employer])   Q1: Little interest or pleasure in doing things 0 0 0 0 0 0 0   Q2: Feeling down, depressed or hopeless 0 0 0 0 0 0 0   PHQ-2 Score 0  0  0 0 0 0 0   PHQ-2  Total Score (12-17 Years)- Positive if 3 or more points; Administer PHQ-A if positive      0 0   Q1: Little interest or pleasure in doing things Not at all Not at all   Not at all    Not at all     Q2: Feeling down, depressed or hopeless Not at all Not at all   Not at all    Not at all     PHQ-2 Score 0 0   0    0         Patient-reported     PHQ9:       12/7/2017     8:08 AM 6/22/2021    12:53 PM 6/27/2024     4:48 PM 1/21/2025     2:32 PM   PHQ-9 SCORE   PHQ-9 Total Score MyChart  2 (Minimal depression)  3 (Minimal depression)   PHQ-9 Total Score 1 2 1 3        Patient-reported     GAD2:       1/21/2025     2:38 PM   GISEL-2   Feeling nervous, anxious, or on edge 3   Not being able to stop or control worrying 3   GISEL-2 Total Score 6        Patient-reported     GAD7:       12/7/2017     8:08 AM 6/22/2021    12:59 PM 6/27/2024     4:48 PM 1/21/2025     2:38 PM   GISEL-7 SCORE   Total Score  3 (minimal anxiety)  17 (severe anxiety)   Total Score 1 3 0 17        Patient-reported     CAGE-AID:       6/22/2021     1:20 PM 1/21/2025     2:38 PM   CAGE-AID Total Score   Total Score  0    Total Score MyChart  0 (A total score of 2 or greater is considered clinically significant)       Information is confidential and restricted. Go to Review Flowsheets to unlock data.    Patient-reported     [place] Suicide Severity Rating Scale (Lifetime/Recent)      7/3/2024     3:44 PM 12/10/2024    12:21 PM 1/8/2025     7:00 PM 1/15/2025     3:16 AM 1/16/2025     9:00 AM 1/22/2025     9:33 AM   [place] Suicide Severity Rating (Lifetime/Recent)   Q1 Wished to be Dead (Past Month) 0-->no 0-->no 0-->no 0-->no 0-->no    Q2 Suicidal Thoughts (Past Month) 0-->no 0-->no 0-->no 0-->no 0-->no    Q6 Suicide Behavior (Lifetime) 0-->no 0-->no 0-->no 0-->no 0-->no    Level of Risk per Screen no risks indicated no risks indicated no risks indicated no risks indicated no risks indicated    1. Wish to be Dead (Lifetime)      N   2. Non-Specific Active  Suicidal Thoughts (Lifetime)      N   Actual Attempt (Lifetime)      N   Has subject engaged in non-suicidal self-injurious behavior? (Lifetime)      N   Interrupted Attempts (Lifetime)      N   Aborted or Self-Interrupted Attempt (Lifetime)      N   Preparatory Acts or Behavior (Lifetime)      N   Calculated C-SSRS Risk Score (Lifetime/Recent)      No Risk Indicated        Referral:   Patient was referred to [facility] by self.    Reason for referral: determine behavioral health treatment options.      [facility] introduced self and role. Discussed informed consent and limits to confidentiality.     Presenting Concerns/ Current Stressors:     Pt has traumatic birth last week, feeling overwhelmed with transition.  Had bad reaction to Zoloft on Sunday which caused an panic attack.  Pt is HOPE program for high blood pressure. Pt is anxious 95% of the time.  Pt will work on setting boundaries and asking for what she needs.     Therapeutic Interventions:  Motivational Interviewing (MI): Validated patient's thoughts, feelings and experience. Expressed respect for patient's autonomy in decision making.  Asked open-ended questions to invite patient's self-reflection and self-direction around change and what is important for them in working towards their goals.  Expressed and demonstrated empathy through reflective listening.   Psycho-education: Provided psycho-education about patient's behavioral health condition and symptoms. Explained and reviewed treatment options.    Response to treatment interventions:   Patient was receptive to interventions utilized.  Patient was engaged in the therapy process.      Safety Issues and Plan for Safety and Risk Management:     Patient denies a history of suicidal ideation, suicide attempts, self-injurious behavior, homicidal ideation, homicidal behavior, and and other safety concerns   Patient denies current fears or concerns for personal safety.   Patient denies current or recent suicidal ideation  or behaviors.   Patient denies current or recent homicidal ideation or behaviors.   Patient denies current or recent self injurious behavior or ideation.   Patient denies other safety concerns.   Recommended that patient call 911 or go to the local ED should there be a change in any of these risk factors   Patient reports there are no firearms in the house.       ASSESSMENT:   Mental Status:     Appearance:   Appropriate    Eye Contact:   Good    Psychomotor Behavior: Normal    Attitude:   Interested Attentive   Orientation:   All   Speech Rate / Production: Normal/ Responsive   Volume:   Normal    Mood:    Depressed  Sad    Affect:    Appropriate    Thought Content:  Clear    Thought Form:  Goal Directed    Insight:    Good         Diagnostic Criteria:   Adjustment Disorder  A. The development of emotional or behavioral symptoms in response to an identifiable stressor(s) occurring within 3 months of the onset of the stressor(s)  B. These symptoms or behaviors are clinically significant, as evidenced by one or both of the following:       - Marked distress that is out of proportion to the severity/intensity of the stressor (with consideration for external context & culture)  C. The stress-related disturbance does not meet criteria for another disorder & is not not an exacerbation of another mental disorder  D. The symptoms do not represent normal bereavement  E. Once the stressor or its consequences have terminated, the symptoms do not persist for more than an additional 6 months       * Adjustment Disorder with Anxiety: The predominant manfestations are symptoms such as nervousness, worry, or jitteriness, or, in children separation anxiety from major attachment figures        DSM5 Diagnoses: (Sustained by DSM5 Criteria Listed Above)     Diagnoses: Adjustment Disorders  309.24 (F43.22) With anxiety     Psychosocial / Contextual Factors:  had traumatic birth one week ago.       Collateral Reports Completed:   Not  Applicable        PLAN: (Homework, other):     1. Patient was provided:  recommendation to schedule follow-up with [facility]     2. Pt will set boundaries with loved ones and ask for what she needs.      3. Suicide Risk and Safety Concerns were assessed for Phoebe Morris    Safety Plan:   Patient denied any current/recent/lifetime history of suicidal ideation and/or behaviors. Recommended that patient call 911 or go to the local ED should there be a change in any of these risk factors       DEB Santana   January 22, 2025

## 2025-01-21 NOTE — TELEPHONE ENCOUNTER
Delivered via  section on 25  Starting having anxiety and depression symptoms in the hospital prior to discharge.   Was given Zoloft 25 mg to start, started on  while she was in the hospital. Day 2 of taking it, yesterday, felt very on edge, felt like she was having a panic attack all day, could feel her heart racing in her throat, only ate 1 bagel, no appetite and nauseous.   Has not taken zoloft yet today and feels normal/okay.  She got some good sleep last night and her pain in under control.     She is getting set up with the [facility]   She was sent with 30 pills of hydroxyzine 50 mg for itching, anxiety, sleep    Should she continue with the zoloft, taking it at night?  Prescribe hydroxyzine 25 mg to take throughout the day as needed?     Routing to on call to advise     Samantha DONIS RN   [facility] Triage RN

## 2025-01-21 NOTE — TELEPHONE ENCOUNTER
If she may have had a bad reaction to sertraline, I don't recommend she continue it. She can take the hydroxyzine PRN for anxiety. If she is interested in a daily medication, recommend referral to psych for med management, ordered in case she needs it.    [facility] appt tomorrow.     Justine Luz MD

## 2025-01-21 NOTE — TELEPHONE ENCOUNTER
RN called patient back and explained MD recommendations and referral. She is accepting and appreciative.  She did not  the hydroxyzine at the beginning of the month when it was initially prescribed and is wondering if 25 mg dose can be sent so she can take I tup to 3 times daily and not get too sleepy from it.   Rx pended   Samantha DONIS RN   [facility] Triage RN      Rns:  Once hydroxy is sent, send pt a Peeriot message that it was and also number to call psych if she doenst hear in a couple days to get scheduled

## 2025-01-22 ENCOUNTER — VIRTUAL VISIT (OUTPATIENT)
Dept: BEHAVIORAL HEALTH | Facility: CLINIC | Age: 25
End: 2025-01-22
Payer: COMMERCIAL

## 2025-01-22 ENCOUNTER — TELEPHONE (OUTPATIENT)
Dept: MATERNAL FETAL MEDICINE | Facility: CLINIC | Age: 25
End: 2025-01-22
Payer: COMMERCIAL

## 2025-01-22 DIAGNOSIS — O26.892 LOW BACK PAIN DURING PREGNANCY IN SECOND TRIMESTER: ICD-10-CM

## 2025-01-22 DIAGNOSIS — O21.9 NAUSEA/VOMITING IN PREGNANCY: Primary | ICD-10-CM

## 2025-01-22 DIAGNOSIS — M54.50 LOW BACK PAIN DURING PREGNANCY IN SECOND TRIMESTER: ICD-10-CM

## 2025-01-22 DIAGNOSIS — F43.22 ADJUSTMENT DISORDER WITH ANXIETY: Primary | ICD-10-CM

## 2025-01-22 RX ORDER — NIFEDIPINE 30 MG/1
30 TABLET, EXTENDED RELEASE ORAL DAILY
Qty: 30 TABLET | Refills: 0 | Status: SHIPPED | OUTPATIENT
Start: 2025-01-22 | End: 2025-02-21

## 2025-01-22 ASSESSMENT — COLUMBIA-SUICIDE SEVERITY RATING SCALE - C-SSRS
2. HAVE YOU ACTUALLY HAD ANY THOUGHTS OF KILLING YOURSELF?: NO
1. HAVE YOU WISHED YOU WERE DEAD OR WISHED YOU COULD GO TO SLEEP AND NOT WAKE UP?: NO
TOTAL  NUMBER OF ABORTED OR SELF INTERRUPTED ATTEMPTS LIFETIME: NO
ATTEMPT LIFETIME: NO
TOTAL  NUMBER OF INTERRUPTED ATTEMPTS LIFETIME: NO
6. HAVE YOU EVER DONE ANYTHING, STARTED TO DO ANYTHING, OR PREPARED TO DO ANYTHING TO END YOUR LIFE?: NO

## 2025-01-22 NOTE — TELEPHONE ENCOUNTER
Home Observation of Postpartum Elevated BP (HOPE-BP) Program     Phoebe Morris enrolled in the HOPE-BP Program on 1/19/2025, 3 days ago. Delivered on 1/16/2025. Diagnosis: Gestational hypertension. Medication(s) prescribed: Nifedipine ER.  Patient reported the following blood pressures in the past 72 hours:       1/20/2025 1/21/2025 1/22/2025   [facility] Health Trends BP Review Flowsheet   Systolic (Patient Reported) 146 135    137 140   Diastolic (Patient Reported) 99 96    94 96       Multiple values from one day are sorted in reverse-chronological order       Spoke with patient to follow up on blood pressure. Based on self-report, Phoebe's blood pressures were in the High Range (-159 OR DBP ). Patient reported no blood pressure related symptoms. Patient denied: headaches, chest pain, difficulty breathing or shortness of breath, unsteady gait, weakness, blurred vision, dizziness, right upper quadrant pain, lip swelling (angioedema), difficulty swallowing, palpitations, rash, acute joint redness or pain (gout), leg edema, and symptomatic hypotension.  RN spoke with Lesia Lobo CNM. Plan to start Nifedipine 30mg daily. Rx sent to preferred pharmacy. Pt verbalized understanding and encouraged to check BP twice a day and take medication as prescribed.   .     CLAUDE ARDON RN

## 2025-01-22 NOTE — TELEPHONE ENCOUNTER
Home Observation of Postpartum Elevated BP (HOPE-BP) Program     Phoebe Morris enrolled in the HOPE-BP Program on 1/19/2025, 3 days ago. Delivered on 1/16/2025. Diagnosis: Gestational hypertension. Medication(s) prescribed: Nifedipine ER.  Patient reported the following blood pressures in the past 72 hours:       1/20/2025 1/21/2025 1/22/2025   [facility] Health Trends BP Review Flowsheet   Systolic (Patient Reported) 146 135    137 140   Diastolic (Patient Reported) 99 96    94 96       Multiple values from one day are sorted in reverse-chronological order     Left message for Phoebe to call [facility] RN at [phone] regarding elevated BP. RN spoke with [facility] provider and recommended starting Nifedipine 30mg daily.    CLAUDE ARDON RN

## 2025-01-23 ENCOUNTER — QUESTIONNAIRE SERIES SUBMISSION (OUTPATIENT)
Dept: OTHER | Age: 25
End: 2025-01-23

## 2025-01-23 ENCOUNTER — TELEPHONE (OUTPATIENT)
Dept: MATERNAL FETAL MEDICINE | Facility: CLINIC | Age: 25
End: 2025-01-23

## 2025-01-23 NOTE — TELEPHONE ENCOUNTER
Home Observation of Postpartum Elevated BP (HOPE-BP) Program     Phoebe Morris enrolled in the HOPE-BP Program on 1/19/2025, 4 days ago. Delivered on 1/16/2025. Diagnosis: Gestational hypertension. Medication(s) prescribed: Nifedipine ER.  Patient reported the following blood pressures in the past 72 hours:       1/20/2025 1/21/2025 1/22/2025 1/23/2025   [facility] Health Trends BP Review Flowsheet   Systolic (Patient Reported) 146 135    137 143    140 134    142   Diastolic (Patient Reported) 99 96    94 96    96 101    98       Multiple values from one day are sorted in reverse-chronological order     Writer called and spoke with patient to check in and follow up on her blood pressures today, and if she started the Nifedipine 30mg daily. Patient reports she is feeling well, denies s/s of HTN. States she picked up the Nifedipine last evening and took her first dose this morning. Discussed we will continue to watch her blood pressures as we want to allow time for the medication to work. If continues to be elevated will reach out at that time, encouraged patient to reach out to us with any s/s of HTN and or questions/concerns, she VU.

## 2025-01-30 ENCOUNTER — DOCUMENTATION ONLY (OUTPATIENT)
Dept: PEDIATRICS | Facility: CLINIC | Age: 25
End: 2025-01-30
Payer: COMMERCIAL

## 2025-01-30 ASSESSMENT — PATIENT HEALTH QUESTIONNAIRE - PHQ9
SUM OF ALL RESPONSES TO PHQ QUESTIONS 1-9: 9
10. IF YOU CHECKED OFF ANY PROBLEMS, HOW DIFFICULT HAVE THESE PROBLEMS MADE IT FOR YOU TO DO YOUR WORK, TAKE CARE OF THINGS AT HOME, OR GET ALONG WITH OTHER PEOPLE: SOMEWHAT DIFFICULT
SUM OF ALL RESPONSES TO PHQ QUESTIONS 1-9: 9

## 2025-01-30 NOTE — PROGRESS NOTES
"[facility] Pediatrics Lactation Visit     Assessment:      difficulty in feeding at breast     Luisana has had excellent weight gain since last visit and is currently 12% above birth weight. She has been successfully nursing without the nipple shield and mom, Phoebe, appears to have an abundant milk supply. Luisana was able to latch well to the breast today and efficiently transferred 2.8 oz from one side. She appeared content after this. We discussed that her \"clamping\" down onto the breast is likely due to a fast milk ejection reflex secondary to oversupply. Currently Phoebe is nursing on demand and avoiding pumping - this will help her milk supply regulate and slow down the flow of milk. I recommended some strategies to help speed this process along including block nursing and feeding in a laid back position for now. I expect that this problem will resolve over the next few weeks. I recommended that they return for another lactation visit just as needed for ongoing breastfeeding concerns.         Plan:        Patient Instructions   Continue to breastfeed on demand, at least 8 times a day. Many breast fed babies nurse 12 times per day or more.      Offer one side at a time for now and alternate which breast you start on. Later on in the future if she starts to cue for more milk after the first side then offer both sides. Latch baby deeply by making a U-shaped \"breast sandwich,\" and aim your nipple for the roof of the mouth. If baby's lips are rolled inward, flip the top lip out with your finger, and then apply gentle downward pressure to the chin to help the lips flange out like \"fish lips.\" If you have pain that lasts beyond the initial latch-on, always restart. When sucking/swallowing frequency starts to slow down, do breast compressions/massage and tickle baby's feet to keep her alert with feeding. A diaper change between sides can be helpful to keep her alert. You can try a more laid back " "position to help slow down the flow of milk.       If your milk starts to flow very quickly and she starts to sputter, cough or clamp down, you could take her off the breast and use your milk catcher and hand express for a bit to catch the fastest phase of your letdown. Then latch her back on. The laid back position will also be helpful in this situation.      Supplementation plan: No need for routine supplementation       Recommended to pump: Pump if you miss a feeding, otherwise no need for routine pumping.     Expect at least 6 wet diapers per day.      It is recommended for all breast fed infants to take Vitamin D 400 IU (10 mcg) daily. This is available over the counter in a concentrated drop (my favorite as it is easier to give) or a 1 ml daily dose.      Return in about 1 week (around 2/6/2025) for As needed for ongoing lactation support .     Average Infant Milk Intake by Age     Age Average milk volume per feeding (mL) Average milk volume per feeding (oz) Average 24 hour milk intake (mL) Average 24 hour milk intake (oz)   Day 1 Few drops - 5mL < tsp Up to 30 mL Up to 1 oz   Day 2 5 - 15 mL <0.5 oz - 1 TB 30 - 120 mL 1 - 4 oz   Day 3 15 - 30 mL  0.5 - 1 oz 120 - 240 mL 4 - 8 oz   Day 4 30 - 45 mL  1 - 1.5 oz 240 - 360 mL 8 - 12 oz   Day 5-7 45 - 60 mL 1.5 - 2 oz 360 - 600 mL 12 - 18 oz   Week 2-3 60 - 90 mL 2 - 3 oz 450 - 750 mL 15 - 25 oz   Months 1-6 90 - 150 mL 3 - 5 oz 750 - 1035 mL 25 - 35 oz      Luisana transferred 2.8 oz from the R side.            Nipple healing:      Things that can be helpful:      -#1 is good latching technique. Don't let your baby continue with a shallow or painful latch - always restart.      DO:   - Treat a wound like a burn: Keep covered 24/7  - A short course of steroid cream (like 0.1% triamcinolone) can help serious pain or blebs  - Use Polymem (\"Nursicare\") for wounds that are moist or cratered  - Use Hydrogels (\"soothies\") for dry wounds  - Nipple Balm/ Oil can also be " "helpful for dry wounds   - Try nursing your baby in different positions to take the pressure off of the same spot  - Use appropriately fitting pump flanges  - Take over the counter pain medications as needed for comfort as approved by your health care provider     DO NOT:   -Soak in salt water (this can break down the skin)   -Use APNO, antibacterial cream, antifungal cream, boric acid, gentian violet, essential oils (these can be irritating and make the pain worse)  - Clean the nipple with alcohol, harsh soap, dish detergent  - Use a hair dryer or needle  - Use breast shells (these can create pressure sores or worsen swelling) or silver nipple covers (if engorged, these can cause swelling and skin breakdown). Some women find the \"silverettes\" to be helpful - stop use if they are causing pain or causing pressure marks on/ around your nipple.     Normal human wound healing is about 8 - 10 days after a traumatic insult (like a shallow latch that caused a sore). The nipple has a great blood supply so heals quickly, even if you continue breastfeeding during the healing process.            There are things you can do to help your baby learn to sleep well.      My favorite book on sleep is \"Get your baby to sleep the baby sleep  way\" by Betsy Hudson      Sleep tips:   1. Start early trying to put your baby down to sleep while drowsy but awake. Watch your baby for sleepy cues and then put them down to sleep before they have completely fallen asleep in your arms. This may not work every time you try, and that is ok. Just try again at the next nap or bedtime. Some babies will cry or whimper softly before falling asleep on their own, this is not the same thing as \"cry it out.\"      2. When your baby wakes during the night, you can pause briefly before you get up to feed them. Some babies will make a little noise or cry briefly and then put themselves back to sleep. When you do get up with your baby, keep interactions " "quiet and brief. Keep lights very dim. A red nightlight can help keep everyone in \"nighttime\" mode. Bright lights during the day will help baby sort out days and nights.       3. Start a consistent bedtime routine early on, as early as 6 weeks. A quick bath, putting on pajamas, and feeding is a common bedtime routine. Try to keep the lights low and the room quiet before bedtime. The routine you choose is less important than that it is the same every day. Use an abbreviated form of the bedtime routine when you put your baby down for a nap.      4. Around 6 weeks, many babies are ready for an early bedtime. Some parents believe that keeping their baby up later will help them sleep longer, but this will often backfire. By 4 months nearly all babies can benefit from an early bedtime between 6 and 8 pm.      5. Sleep begets sleep. If your baby is able to have good naps during the day, night sleep will often go better.      6. Get help! Taking care of a baby is very hard, and when you are sleep deprived, everything is harder! Postpartum depression and sleep deprivation are closely linked. Do what you can to protect your own sleep, like napping during the day, going to bed early, and sharing responsibilities with another caregiver.                 Return in about 1 week (around 2/6/2025) for As needed for ongoing lactation support .        SUBJECTIVE:      Luisana is here today with mom, Phoebe, and dad, Devyn, for lactation support. She is a 2 week old female born at Gestational Age: 39w1d now 14 days.    She is doing well. She has gained 5.5 oz since last visit 3 days ago. She has gained approximately 1.8 oz per day over the past 3 days and is now 12% from birth weight.   .     [facility] Lactation Visit Questionnaire     Question 1/30/2025  8:45 AM CST - Filed by Patient   What is your main concern today? lactation   Your baby's first name: luisana   Your baby's last name: bryson   Baby's most recent weight: 8lb 1.5oz "   Date: 25   Since your last visit, have you had any new medical problems or surgeries? No   How often does your baby eat? every 2-3 hours   How long does each feeding last? about 15 to 25 minutes   How much of the time does your baby take both breasts when nursing? 75%   Can you hear the baby swallowing during nursing? Yes   How many times does your baby feed in 24 hours? 10   How many times does your baby urinate (pee) in 24 hours? 8   How many stools (poops) does your baby have in 24 hours? 6   Describe the color and consistency of the poop: yellow,mustardy turning from seedy to more solid   Do you give your baby extra milk in addition to or instead of breastfeeding? No   How much extra do you usually give?     How do you give extra milk?     Are you pumping your breasts? No   What else would you like the lactation consultant to know?        Vistaril - taking it 3 times per day   Zoloft - had a bad reaction - panic attack, on edge. Has an upcoming appointment with a psychiatrist     Currently pumping 1 time yesterday - gets 160 - 200 mls   Using the shield in the last 24 hours. She has had one bottle in the last 24 hours.         Breastfeeding Goals: Hoping to learn her cues, feed as she wants. Hoping to wean from the nipple shield, unsure about nursing duration.      Previous Breastfeeding Experience: First baby  Breast-surgery: None  Maternal medications: see above         Hospital course:  Mom had triple I and was on antibiotic for 24 hours. Luisana delivered by emergency . Procedure itself went well, she was not  from mom and did not have a NICU stay. Latched right away, bottle fed based on cues in the hospital. Pumping in the hospital, this went well, made a good amount of milk right away.         No results found for any visits on 25.    Current Medications      Current Outpatient Medications:     cholecalciferol (D-VI-SOL, VITAMIN D3) 10 mcg/mL (400 units/mL) LIQD liquid,  "Take 1 mL (10 mcg) by mouth daily., Disp: 50 mL, Rfl: 11     Past Medical History   No past medical history on file.     Past Surgical History   No past surgical history on file.     Family History   No family history on file.           Primary care provider: Keiry Peralta     OBJECTIVE:     Mother:   Nipples are everted, the areola is compressible, the breast is soft and full.      Sore nipples: Mild soreness, particularly on the L side when she clamps down.   Maternal depression screening: Referral to maternal PCP - doing better compared to last week   EPDS: Referral to maternal PCP made     Infant:      Age today: 14 days     Pulse 142   Resp 40   Ht 1' 9\" (0.533 m)   Wt 8 lb 7 oz (3.827 kg)   HC 14.25\" (36.2 cm)   BMI 13.45 kg/m          Weight:       Wt Readings from Last 3 Encounters:   01/30/25 8 lb 7 oz (3.827 kg) (62%, Z= 0.30)*   01/27/25 8 lb 1.5 oz (3.671 kg) (57%, Z= 0.19)*   01/23/25 7 lb 9.9 oz (3.456 kg) (50%, Z= 0.01)*      * Growth percentiles are based on WHO (Girls, 0-2 years) data.         Birthweight:  7 lbs 8.28 oz.   Today's weight:  8 lbs 7 oz This is 12% from birth weight.         Test weights:     LEFT side: Latched briefly, did not assess transfer  RIGHT side: 2.3 oz     TOTAL transfer:  2.3 oz         Feeding assessment:      Digital suck assessment:  Infant draws consultant's finger into mouth, palate intact, tongue over gums, normal frenulum.      Baby can hold suction with tongue while at the breast.      Alignment: Baby's head was aligned with its trunk. Baby did face mother. Baby was in cross cradle position today.      Areolar Grasp: Baby was able to open mouth wide. Baby's lips were not pursed. Baby's lips did flange outward. Tongue was visible just barely over bottom lip. Baby had complete seal.      Areolar Compression: Baby made rhythmic motion. There were no clicking or smacking sounds. There was no severe nipple discomfort.  Nipples appeared round after feeding.   "   Audible swallowing: Baby made quiet sounds of swallowing: There was an increase in frequency after milk ejection reflex. The milk ejection reflex is appropriate and milk supply appears abundant.      PHYSICAL EXAM     Gen: Alert, no acute distress.   Head: Anterior fontanelle flat and soft.   Mouth:Lips pink. Oral mucosa moist. Tongue midline (good lateralization, movement, and lift; able to extend pass lower gumline).  Palate intact. Coordinated suck.  Lungs: Clear to auscultation bilaterally.   Cardiac: Regular regular rate and rhythm, S1S2, no murmurs.  Abdomen: Soft, nontender, bowel sounds present, no hepatosplenomegaly or mass palpable. Umbilicus dry with no erythema or drainage.   : Tristan stage 1 female genitalia  Skin: Intact, dry, appropriate coloring for ethnicity, no jaundice.   Neuro: Appropriate muscle tone.     The visit lasted a total of 50 minutes that I spent on this visit today. This time includes pre-charting, review of the chart, and face to face time with the patient.      Completed by:   CARLTON Velez, IBCLC, [facility], Pediatrics.  1/30/2025 9:03 AM

## 2025-01-31 ENCOUNTER — VIRTUAL VISIT (OUTPATIENT)
Dept: PSYCHIATRY | Facility: CLINIC | Age: 25
End: 2025-01-31
Payer: COMMERCIAL

## 2025-01-31 VITALS — SYSTOLIC BLOOD PRESSURE: 122 MMHG | DIASTOLIC BLOOD PRESSURE: 86 MMHG

## 2025-01-31 DIAGNOSIS — F41.9 ANXIETY: Primary | ICD-10-CM

## 2025-01-31 RX ORDER — PAROXETINE 10 MG/1
TABLET, FILM COATED ORAL
Qty: 30 TABLET | Refills: 1 | Status: SHIPPED | OUTPATIENT
Start: 2025-01-31

## 2025-01-31 ASSESSMENT — PAIN SCALES - GENERAL: PAINLEVEL_OUTOF10: NO PAIN (0)

## 2025-01-31 NOTE — NURSING NOTE
Is the patient currently in the state of [place]? YES    Current patient location: [facility] [place] [facility] [address]    Visit mode:Video    If the visit is dropped, the patient can be reconnected by: VIDEO VISIT: Text to cell phone:   Telephone Information:   Mobile [phone]       Will anyone else be joining the visit? No  (If patient encounters technical issues they should call [phone])    Are changes needed to the allergy or medication list? No    Are refills needed on medications prescribed by this physician? No    Rooming Documentation: Questionnaire(s) completed.    Reason for visit: Consult     BETTINA Lujan

## 2025-01-31 NOTE — PROGRESS NOTES
"Virtual Visit Details    Type of service:  Video Visit     Originating Location (pt. Location): {video visit patient location:284875::\"Home\"}  {PROVIDER LOCATION On-site should be selected for visits conducted from your clinic location or adjoining [facility] hospital, academic office, or other nearby [facility] building. Off-site should be selected for all other provider locations, including home:152071}  Distant Location (provider location):  {virtual location provider:092251}  Platform used for Video Visit: {Virtual Visit Platforms:128472::\"Travel AppealWell\"}       Lorenzo Reeder PA-C  Psychiatric Services  [facility]  [address]  [place] [address]  [phone]         Phoebe Morris is a 24 year old referred by Justine Luz for evaluation of ***. Initial consultation on 01/31/25.   Who referred you to care?: (Patient-Rptd) current Behavioral Health Provider  CARE TEAM:   PCP- Edita Parrish  Therapist- {NONE:549412}  {prov:982315}                Chief Complaint   Phoebe identified the reason for seeking services at this time as: \"(Patient-Rptd) Postpartum anxiety/depression\". Reported this as beginning approximately (Patient-Rptd) 01/16/25.                Assessment & Plan   Phoebe  is a 24 year old White  or  individual presenting for psychiatric evaluation and medication management through the {psychservices:182509}. Information is obtained from patient and available records.  Reports history of *** {BIOPSYCHOSOCIAL:787093::\"Denies prior psychiatric hospitalizations.\",\"No history of suicidal thoughts or attempts.\",\"No history of self-injurious behaviors.\",\"No identified genetic load for psychiatric illnesses.\",\"Grew up in an intact home with all basic needs being met.\"}  History and interview support the following diagnoses:   ***  Psychotherapy discussion: Primary recommendation for the treatment of mood symptoms, especially anxiety. " "***Supportive therapy can be useful for reducing the impact of acute or chronic psychosocial stressors. ***CBT can be particularly helpful for ruminative thinking and addressing maladaptive coping mechanisms that may worsen anxiety.   Medication discussion:   Primary mood support medications:   ***  Short acting anxiety / sleep medications:   ***  Antiadrenergic medications:   ***    [place]-PDMP {was:263657} checked today: {P:018559::\"indicates taking controlled substances as prescribed\"}    PSYCHOTROPIC DRUG INTERACTIONS: **Italicized interactions are for treatment plan options not currently implemented**  {None:526531}   MANAGEMENT:  {INTXNMANAGE:078032}  Safety Risk-Phoebe {safe:496791}              Plan    1) PSYCHOTROPIC MEDICATION RECOMMENDATIONS:  - ***    [see the following SmartPhrase(s) for more information: PSYMEDINFO***]    2) THERAPY: Psychotherapy is a primary recommendation.   Currently seeing ***  May benefit from ***      3) NEXT DUE:   Labs- {:910642::Routine monitoring is not indicated for current psychotropic medication regimen}   ECG- {:693941::Routine monitoring is not indicated for current psychotropic medication regimen}   Rating Scales- {PSYCHRATINGSCALES:582286::\"N/A\"}    4) REFERRALS / COORDINATION: {REF:390867::\"None\"}    5) DISPOSITION:   {Option 1 - Use this group if doing one time consult:590884}  - Pt is currently psychiatrically stable{VV Consult Long Term Plan:174961}. Follow up with designated prescriber.   - Prescriber should reference the recommendations above and implement as they deem appropriate.   - If further recommendations are desired or if clarification is needed, prescriber should contact Lorenzo Reeder PA-C via staff message for further curbside / chart review consultation  {Option 2 - Use this group if doing brief care:595389}  - Pt would benefit from brief psychiatric intervention (up to ~5 visits) to adjust meds and gauge for benefit.   - Follow up with Lorenzo NATARAJAN" "STEVE Reeder in *** weeks. Plan to transition med management back to designated prescriber after brief care is complete.   - If not seen for 6 months, follow up and prescribing will automatically revert back to referring provider / PCP.     Treatment Risk Statement:  The patient understands the risks, benefits, adverse effects and alternatives. Agrees to treatment with the capacity to do so. No medical contraindications to treatment. Agrees to contact care team for any problems. The patient understands to call 911 or go to the nearest ED if urgent or life threatening symptoms occur. Crisis resources are provided routinely in the After Visit Summary.       PROVIDER:  Lorenzo Reeder PA-C                  History of Present Illness   Hydroxyzine. Discharged from the hospital and panic attack and and started sertaline.     Recent Symptoms:   Depression: No suicidal ideation. Interests are lacking.   Andria:  No Symptoms  Psychosis: Hospital, middle of the night hearing thing music was on. Denies.   Anxiety: Not to that level. Undiagnosed anxiety and pregnancy amplified. Constantly worrying. High heart rate.   Panic:  SOB.   Post Traumatic Stress Disorder:   denies. Mother car accident, brother 4 or 5 and she had her license revoked and she had to step into parental role. Sxs: motherly \"have to tend to everyone's needs.     Eating Disorder: {OP BEH SYMPTOMS EATING D/O:093290}  ADD / ADHD:  Denies.   Conduct Disorder: {OP BEH SYMPTOMS CONDUCT DISORDER:868069}  Autism Spectrum Disorder: {OP BEH AUTISM SPECTRUM:823540}  Obsessive Compulsive Disorder: Everything needs to be clean.   Personality Disorders:  {PERSONALITY DISORDERS:803542}    Patient reports the following compulsive behaviors and treatment history:  constantly on phone .      Diagnostic Criteria:   {OP BEH DSM 5 Criteria:322088}    Pertinent Social Hx:  FINANCIAL SUPPORT-  .   LIVING SITUATION / RELATIONSHIPS- ***   SOCIAL/ SPIRITUAL " "SUPPORT- {:666746}    Pertinent Substance Use  Alcohol- occasional alcohol use. Has not drank 12/23  Nicotine- {NONE:013150}  Caffeine- 1 beverage.   Opioids- {NONE:817266}  Narcan Kit- {:748746::N/A}  THC/CBD- {NONE:642163}  Other Illicit Drugs- {drugs:732733::\"none\"}    Substance Use History  Past Use- {subcrit:358057}  Treatment [#, most recent]- {NONE:411037}  Medical Consequences [withdrawal, sz etc]- {NONE:350855}  Legal Consequences- {NONE:548076}  {OP BEH ADULT SUBSTANCE USE:838709}                Medical Review of Systems   Dizziness/orthostasis- ***  Headaches- ***  GI- ***  Sexual health concerns- {NONE:137671}  {ros:179841::\"A comprehensive review of systems was performed and is negative other than noted above.\"}                Past Psychiatric History            Self injurious behavior [method, most recent]- {NONE:361584}  Suicide attempt [#, most recent, method]- {NONE:268717}  Suicidal ideation [passive, active]- {NONE:436253}   What in the following list protects you from causing harm to yourself or others?: (Patient-Rptd) forward or future oriented thinking;dedication to family or friends;regular sleep;regular physical activity;structured day;commitment to well being;sense of meaning, Are there firearms in your home?: (Patient-Rptd) are not    Psychosis hx- {NONE:789460}  Psych hosp [ #, most recent, committed]- {NONE:920609}  ECT/TMS [#, most recent]- {NONE:222592}    Eating disorder hx- {NONE:925237}  Trauma hx- {NONE:459580}  Outpatient programs [Day treatment, DBT, eating disorder tx, etc]- {NONE:824884}              Past Psychotropic Medications  {NEWER ANTIDEP:864814}  {OLDER ANTIDEP:308241}  {MOOD STABILIZERS:062337}  {NEWER ANTIPSYCH:053355}  {OLDER ANTIPSYCH:863097}  {TRANQ:915031}  {SEDS / HYPNOTS:251279}  {STIMULANTS / ADHD MEDS:435885}     Medication Max Dose (mg) Dates / Duration Helpful? DC Reason / Adverse Effects?                                                           Are you " taking/ not taking prescription medications as they were prescribed?: (Patient-Rptd) taking              Social History                [per patient report]  Financial- What are your current financial sources?: (Patient-Rptd) employment, Does your finances cause stress?: (Patient-Rptd) does not  Employment- What is your employment status?: (Patient-Rptd) employed fulltime, Able to function?: (Patient-Rptd) yes, If you work in a paying job or as a volunteer, describe the job and how long you have held it: : (Patient-Rptd)  for ~2.5 years Did you serve in the ?: (Patient-Rptd) did not  Living situation- What is your housing situation?: (Patient-Rptd) staying in own home/apartment  Feels safe at home- Yes  Household / family- Name: (Patient-Rptd) Devyn Sanders, Age: (Patient-Rptd) 28, Relationship: (Patient-Rptd) Fiance, Living in same house?: (Patient-Rptd) yes  Relationships- What is your current relationship status? : (Patient-Rptd) has a partner or significant other, What is your sexual orientation?: (Patient-Rptd) heterosexual  Children- Do you have children?: (Patient-Rptd) yes, How many children do you have?: (Patient-Rptd) 1, Ages of girls:: (Patient-Rptd) 2 weeks old  Social/spiritual support- Who are the most supportive people in your life?  : (Patient-Rptd) partner;parents;siblings;friends;co-worker  Cultural- What is your cultural background? : (Patient-Rptd) ;, What are ethnic, cultural, or Buddhist influences that may be useful to know about you (for example history of experiencing discrimination, growing up rural/urban, valuing culturally specific treatments)?  : (Patient-Rptd) Grew up with little diversity in school, What is your preferred language?  : (Patient-Rptd) English  Education- What is your highest education? : (Patient-Rptd) college graduate  Early history- Where did you grow up?: (Patient-Rptd) [facility], Who took care of you as a child?:  (Patient-Rptd) biological parents  Raised by- How would you describe your parent's relationships?: (Patient-Rptd) were always together  Siblings- Do you have siblings?: (Patient-Rptd) yes, How many full siblings do you have?: (Patient-Rptd) 3  Quality of family relationships- How would you describe your current family relationships?: (Patient-Rptd) stable and meaningful  Legal- Have you been involved with the legal system (child custody, order for protection, DWI, etc.)?: (Patient-Rptd) have not, Do you have a ?  : (Patient-Rptd) does not                Family History   I have reviewed this patient's family history and updated it with pertinent information if needed.  Family History   Problem Relation Age of Onset    No Known Problems Mother     Diabetes Father         Type 2    No Known Problems Maternal Grandmother     Diabetes Maternal Grandfather     Myocardial Infarction Paternal Grandmother         Heart attack    Hypertension Paternal Grandmother     No Known Problems Brother     No Known Problems Brother      Brother: attempted last year.                 Past Medical History     Neurologic Hx [head injury, seizures, etc]: concussion in highschool   Patient Active Problem List   Diagnosis    Family history of diabetes mellitus in father    Nausea/vomiting in pregnancy    Low back pain during pregnancy in second trimester    Encounter for triage in pregnant patient    Encounter for induction of labor    Chorioamnionitis    Gestational hypertension     Past Medical History:   Diagnosis Date    Obesity                      Medications   Current Outpatient Medications   Medication Sig Dispense Refill    acetaminophen (TYLENOL) 325 MG tablet Take 2 tablets (650 mg) by mouth every 6 hours as needed for mild pain. Start after Delivery. 100 tablet 0    cyclobenzaprine (FLEXERIL) 10 MG tablet Take 1 tablet (10 mg) by mouth 3 times daily. 30 tablet 0    ferrous gluconate (FERGON) 324 (38 Fe) MG tablet  Take 1 tablet (324 mg) by mouth daily (with breakfast). 60 tablet 0    hydrOXYzine (VISTARIL) 50 MG capsule Take 1 capsule (50 mg) by mouth 3 times daily as needed for itching, anxiety or other (sleep). 30 capsule 0    hydrOXYzine lawrence (VISTARIL) 25 MG capsule Take 1 capsule (25 mg) by mouth 3 times daily as needed for anxiety. 90 capsule 1    ibuprofen (ADVIL/MOTRIN) 600 MG tablet Take 1 tablet (600 mg) by mouth every 6 hours as needed for moderate pain. Start after delivery 60 tablet 0    metoclopramide (REGLAN) 5 MG tablet Take 1 tablet (5 mg) by mouth 4 times daily as needed (nausea) 30 tablet 0    NIFEdipine ER OSMOTIC (PROCARDIA XL) 30 MG 24 hr tablet Take 1 tablet (30 mg) by mouth daily. 30 tablet 0    ondansetron (ZOFRAN) 4 MG tablet Take 1 tablet (4 mg) by mouth every 8 hours as needed for nausea 30 tablet 5    oxyCODONE (ROXICODONE) 5 MG tablet Take 1 tablet (5 mg) by mouth every 4 hours as needed for moderate to severe pain. 18 tablet 0    pantoprazole (PROTONIX) 20 MG EC tablet Take 1 tablet (20 mg) by mouth 2 times daily. 60 tablet 0    polyethylene glycol (MIRALAX) 17 GM/Dose powder Take 17 g (1 Capful) by mouth daily. 510 g 0    Prenatal Vit-DSS-Fe Cbn-FA (PRENATAL AD PO)       senna-docusate (SENOKOT-S/PERICOLACE) 8.6-50 MG tablet Take 1 tablet by mouth daily. Start after delivery. 100 tablet 0    sertraline (ZOLOFT) 25 MG tablet Take 1 tablet (25 mg) by mouth daily. 60 tablet 0                   Physical Exam  (Vitals Only)  /86   LMP 04/17/2024     Pulse Readings from Last 5 Encounters:   01/19/25 87   01/09/25 114   01/02/25 108   12/26/24 (!) 122   12/19/24 105     Wt Readings from Last 5 Encounters:   01/19/25 108.1 kg (238 lb 4.8 oz)   01/15/25 106.8 kg (235 lb 7.2 oz)   01/09/25 106.8 kg (235 lb 6.4 oz)   01/08/25 106.1 kg (234 lb)   01/02/25 106.4 kg (234 lb 9.6 oz)     BP Readings from Last 5 Encounters:   01/31/25 122/86   01/19/25 139/87   01/15/25 128/75   01/09/25 125/78   01/08/25  "121/                   Mental Status Exam  Alertness: {ALERTNESS DESCRIPTION:435870::\"alert \",\"oriented\"}  Appearance: {a:015758::\"adequately groomed\"}  Behavior/Demeanor: {BEHAVIOR Description:728236::\"cooperative\",\"pleasant\",\"calm\"}, with {Desc; good/fair/poor:949710::\"good \"}eye contact   Speech: {SPEECH Description:785212::\"normal\",\"regular rate and rhythm\"}  Language: {LANGUAGE Description:090476::\"intact\",\"no problems\"}  Psychomotor: {p:702000::\"normal or unremarkable\"}  Gait and Station: {u:955195}  Mood: {m:926660}  Affect: {a:717723::\"full range\",\"appropriate\"}; {:123790::was} congruent to mood  Thought Process/Associations: {THOUGHT PROCESS Description:219524::\"unremarkable\"}  Thought Content:  Reports {t:761345::\"none\"};  Denies {t:823866::\"suicidal ideation\",\"violent ideation\",\"delusions\"}  Perception:  Reports {p:249609::\"none\"};  Denies {p:524378::\"auditory hallucinations\",\"visual hallucinations\"}  Insight: {INSIGHT Description:924595::\"intact\"}  Judgment: {JUDGMENT Description:833683::\"intact\"}  Cognition: {co}                Data       2024    11:12 AM 2024    10:13 AM 2025     7:59 PM   PROMIS-10 Total Score w/o Sub Scores   PROMIS TOTAL - SUBSCORES 34 35 29        Patient-reported         2021     1:20 PM 2025     2:38 PM   CAGE-AID Total Score   Total Score 0 0    Total Score MyChart 0 (A total score of 2 or greater is considered clinically significant) 0 (A total score of 2 or greater is considered clinically significant)       Patient-reported         2024     4:48 PM 2025     2:32 PM 2025     7:48 PM   PHQ   PHQ-9 Total Score 1 3  9    Q9: Thoughts of better off dead/self-harm past 2 weeks Not at all Not at all Not at all       Patient-reported         2021    12:59 PM 2024     4:48 PM 2025     2:38 PM   GISEL-7 SCORE   Total Score 3 (minimal anxiety)  17 (severe anxiety)   Total Score 3 0 17        Patient-reported "         Liver/kidney function Metabolic Blood counts   Recent Labs   Lab Test 01/17/25  0821 01/08/25  2039 01/02/25  0940 11/16/24  2140 08/09/24  1132   CR 0.72 0.57   < > 0.53 0.60   AST 19 8   < > 9 22   ALT 7 6   < > 9 21   ALKPHOS  --   --   --  139 60    < > = values in this interval not displayed.    Recent Labs   Lab Test 06/27/24  1547 05/16/23  1415   A1C 5.1 5.0   TSH  --  2.26    Recent Labs   Lab Test 01/19/25  0810   WBC 9.1   HGB 9.7*   HCT 29.0*   MCV 87           No results found for this or any previous visit.

## 2025-02-04 ENCOUNTER — PRENATAL OFFICE VISIT (OUTPATIENT)
Dept: OBGYN | Facility: CLINIC | Age: 25
End: 2025-02-04
Payer: COMMERCIAL

## 2025-02-04 VITALS
BODY MASS INDEX: 36.39 KG/M2 | DIASTOLIC BLOOD PRESSURE: 81 MMHG | WEIGHT: 212 LBS | HEART RATE: 72 BPM | SYSTOLIC BLOOD PRESSURE: 119 MMHG

## 2025-02-04 DIAGNOSIS — N61.0 MASTITIS: ICD-10-CM

## 2025-02-04 DIAGNOSIS — F41.1 GAD (GENERALIZED ANXIETY DISORDER): Primary | ICD-10-CM

## 2025-02-04 PROCEDURE — 99213 OFFICE O/P EST LOW 20 MIN: CPT | Performed by: OBSTETRICS & GYNECOLOGY

## 2025-02-04 RX ORDER — DICLOXACILLIN SODIUM 500 MG/1
500 CAPSULE ORAL 4 TIMES DAILY
Qty: 28 CAPSULE | Refills: 0 | Status: SHIPPED | OUTPATIENT
Start: 2025-02-04 | End: 2025-02-11

## 2025-02-04 RX ORDER — ESCITALOPRAM OXALATE 10 MG/1
10 TABLET ORAL DAILY
Qty: 90 TABLET | Refills: 1 | Status: SHIPPED | OUTPATIENT
Start: 2025-02-04 | End: 2025-08-03

## 2025-02-04 ASSESSMENT — ANXIETY QUESTIONNAIRES
3. WORRYING TOO MUCH ABOUT DIFFERENT THINGS: NEARLY EVERY DAY
6. BECOMING EASILY ANNOYED OR IRRITABLE: SEVERAL DAYS
IF YOU CHECKED OFF ANY PROBLEMS ON THIS QUESTIONNAIRE, HOW DIFFICULT HAVE THESE PROBLEMS MADE IT FOR YOU TO DO YOUR WORK, TAKE CARE OF THINGS AT HOME, OR GET ALONG WITH OTHER PEOPLE: VERY DIFFICULT
GAD7 TOTAL SCORE: 15
7. FEELING AFRAID AS IF SOMETHING AWFUL MIGHT HAPPEN: SEVERAL DAYS
2. NOT BEING ABLE TO STOP OR CONTROL WORRYING: NEARLY EVERY DAY
GAD7 TOTAL SCORE: 15
8. IF YOU CHECKED OFF ANY PROBLEMS, HOW DIFFICULT HAVE THESE MADE IT FOR YOU TO DO YOUR WORK, TAKE CARE OF THINGS AT HOME, OR GET ALONG WITH OTHER PEOPLE?: VERY DIFFICULT
5. BEING SO RESTLESS THAT IT IS HARD TO SIT STILL: SEVERAL DAYS
4. TROUBLE RELAXING: NEARLY EVERY DAY
GAD7 TOTAL SCORE: 15
1. FEELING NERVOUS, ANXIOUS, OR ON EDGE: NEARLY EVERY DAY
7. FEELING AFRAID AS IF SOMETHING AWFUL MIGHT HAPPEN: SEVERAL DAYS

## 2025-02-04 ASSESSMENT — PATIENT HEALTH QUESTIONNAIRE - PHQ9
SUM OF ALL RESPONSES TO PHQ QUESTIONS 1-9: 8
SUM OF ALL RESPONSES TO PHQ QUESTIONS 1-9: 8
10. IF YOU CHECKED OFF ANY PROBLEMS, HOW DIFFICULT HAVE THESE PROBLEMS MADE IT FOR YOU TO DO YOUR WORK, TAKE CARE OF THINGS AT HOME, OR GET ALONG WITH OTHER PEOPLE: SOMEWHAT DIFFICULT

## 2025-02-05 NOTE — PROGRESS NOTES
[facility]: Integrated Behavioral Health     Integrated Behavioral Health Services   Mental Health and Addiction Services     Brief Diagnostic Assessment        PATIENT'S NAME: Phoebe Morris  MRN: [id number]     : 2000     DATE OF SERVICE: 2025  SERVICE LOCATION: MyChart / Email (patient reached)   SERVICE MODALITY: Video Visit:      Provider verified identity through the following two step process.  Patient provided:  Patient     Telemedicine Visit: The patient's condition can be safely assessed and treated via synchronous audio and visual telemedicine encounter.      Reason for Telemedicine Visit: Patient has requested telehealth visit    Originating Site (Patient Location): Patient's [place]    [place] [facility] [place]:  The patient/guardian has verbally consented to: the potential risks and benefits of telemedicine (video visit) versus in person care; bill my insurance or make self-payment for services provided; and responsibility for payment of non-covered services.     Patient would like the video invitation sent by:  My Chart    Mode of Communication:  Video Conference via Amwell    Distant Location (Provider):  On-site    As the provider I attest to compliance with applicable laws and regulations related to telemedicine.  Visit Start Time: 1:34 PM  Visit End Time:  2:00 PM    VISIT NUMBER: 3  [facility] Visit Activities: [facility] Only     Assessments completed prior to visit:     The following assessments were completed by patient for this visit:  PHQ9:       2017     8:08 AM 2021    12:53 PM 2024     4:48 PM 2025     2:32 PM 2025     7:48 PM 2025    12:21 PM   PHQ-9 SCORE   PHQ-9 Total Score [facility]  2 (Minimal depression)  3 (Minimal depression) 9 (Mild depression) 8 (Mild depression)   PHQ-9 Total Score 1 2 1 3  9  8        Patient-reported     GAD7:       2017     8:08 AM  6/22/2021    12:59 PM 6/27/2024     4:48 PM 1/21/2025     2:38 PM 2/4/2025    12:22 PM   GISEL-7 SCORE   Total Score  3 (minimal anxiety)  17 (severe anxiety) 15 (severe anxiety)   Total Score 1 3 0 17  15        Patient-reported     CAGE-AID:       6/22/2021     1:20 PM 1/21/2025     2:38 PM   CAGE-AID Total Score   Total Score  0    Total Score MyChart  0 (A total score of 2 or greater is considered clinically significant)       Information is confidential and restricted. Go to Review Flowsheets to unlock data.    Patient-reported     PROMIS 10-Global Health (all questions and answers displayed):       9/6/2023     1:05 PM 1/7/2024    11:12 AM 4/24/2024    10:13 AM 1/30/2025     7:59 PM   PROMIS 10   In general, would you say your health is: Excellent Very good Excellent Very good   In general, would you say your quality of life is: Excellent Very good Excellent Very good   In general, how would you rate your physical health? Excellent Very good Excellent Very good   In general, how would you rate your mental health, including your mood and your ability to think? Excellent Very good Very good Good   In general, how would you rate your satisfaction with your social activities and relationships? Excellent Very good Very good Very good   In general, please rate how well you carry out your usual social activities and roles Excellent Very good Very good Good   To what extent are you able to carry out your everyday physical activities such as walking, climbing stairs, carrying groceries, or moving a chair? Completely Completely Completely Completely   In the past 7 days, how often have you been bothered by emotional problems such as feeling anxious, depressed, or irritable? Sometimes Rarely Sometimes Always   In the past 7 days, how would you rate your fatigue on average? None None Mild Mild   In the past 7 days, how would you rate your pain on average, where 0 means no pain, and 10 means worst imaginable pain? 0 1 0 2    In general, would you say your health is: 5 4 5 4   In general, would you say your quality of life is: 5 4 5 4   In general, how would you rate your physical health? 5 4 5 4   In general, how would you rate your mental health, including your mood and your ability to think? 5 4 4 3   In general, how would you rate your satisfaction with your social activities and relationships? 5 4 4 4   In general, please rate how well you carry out your usual social activities and roles. (This includes activities at home, at work and in your community, and responsibilities as a parent, child, spouse, employee, friend, etc.) 5 4 4 3   To what extent are you able to carry out your everyday physical activities such as walking, climbing stairs, carrying groceries, or moving a chair? 5 5 5 5   In the past 7 days, how often have you been bothered by emotional problems such as feeling anxious, depressed, or irritable? 3 2 3 5   In the past 7 days, how would you rate your fatigue on average? 1 1 2 2   In the past 7 days, how would you rate your pain on average, where 0 means no pain, and 10 means worst imaginable pain? 0 1 0 2   Global Mental Health Score 18 16 16 12    Global Physical Health Score 20 18 19 17    PROMIS TOTAL - SUBSCORES 38 34 35 29        Patient-reported     [place] Suicide Severity Rating Scale (Lifetime/Recent)      7/3/2024     3:44 PM 12/10/2024    12:21 PM 1/8/2025     7:00 PM 1/15/2025     3:16 AM 1/16/2025     9:00 AM 1/22/2025     9:33 AM   [place] Suicide Severity Rating (Lifetime/Recent)   Q1 Wished to be Dead (Past Month) 0-->no 0-->no 0-->no 0-->no 0-->no    Q2 Suicidal Thoughts (Past Month) 0-->no 0-->no 0-->no 0-->no 0-->no    Q6 Suicide Behavior (Lifetime) 0-->no 0-->no 0-->no 0-->no 0-->no    Level of Risk per Screen no risks indicated no risks indicated no risks indicated no risks indicated no risks indicated    1. Wish to be Dead (Lifetime)      N   2. Non-Specific Active Suicidal Thoughts  (Lifetime)      N   Actual Attempt (Lifetime)      N   Has subject engaged in non-suicidal self-injurious behavior? (Lifetime)      N   Interrupted Attempts (Lifetime)      N   Aborted or Self-Interrupted Attempt (Lifetime)      N   Preparatory Acts or Behavior (Lifetime)      N   Calculated C-SSRS Risk Score (Lifetime/Recent)      No Risk Indicated        Identifying Information:     Patient is a 24 year old female,       and ,    adult.  Patient attended the session alone.         Referral:   Who referred you to care:  ,   [facility] .    Reason for referral: determine behavioral health treatment options.          Patient's Statement of Presenting Concern:     Patient reports the following reason(s) for seeking an assessment at this time:   postpartum anxiety/depression.  Patient stated that symptoms have resulted in the following functional impairments: childcare / parenting, management of the household and or completion of tasks, relationship(s), and self-care     History of Presenting Concern:     Patient reports that these problem(s) began    1/16/25. Patient has not attempted to resolve these concerns in the past. Patient reports that other professional(s) are not involved in providing support / services. Pt planning to start psych meds today.        Social/Family History:     The patient describes their cultural background as  ,   and     Cultural influences and impact on patient's life structure, values, norms, and healthcare:    did not identify .      Contextual influences on patient's health include: Individual Factors postpartum and Family Factors immediate family lives in [place] .      Cultural, Contextual, and socioeconomic factors do not affect the patient's access to services.  These factors will be addressed in the Preliminary Treatment plan.    Patient identified their preferred language to be  ,  English. Patient reported they do not   need the assistance of an  or other support involved in therapy.      Current living situation:  , , , , , , , , ,  with  and daughter       Socioeconomic status and needs: does not have financial concerns.     Patient's current significant relationships include:  ,  Partner/Significant other    Patient's sexual orientation is  heterosexual,     Patient reported having   1 children.      Patient identified extensive stable and meaningful social connections.      Patient identified the following strengths or resources that will help her: stable home and employment, supportive family and friends    Highest education level was  ,  college graduate.      Patient is currently   on medical leave from pregnancy .     Patient reported that they   have not been involved with the legal system. Do you have a probation office? Patient   denies being on probation / parole / under the jurisdiction of the court       Medical History:    Family History of Mental Health: No    Current Medical Health Concerns: None reported    Current Medication:    Patient reports current meds as:   Current Outpatient Medications   Medication Sig Dispense Refill    acetaminophen (TYLENOL) 325 MG tablet Take 2 tablets (650 mg) by mouth every 6 hours as needed for mild pain. Start after Delivery. 100 tablet 0    dicloxacillin (DYNAPEN) 500 MG capsule Take 1 capsule (500 mg) by mouth 4 times daily for 7 days. 28 capsule 0    escitalopram (LEXAPRO) 10 MG tablet Take 1 tablet (10 mg) by mouth daily. Start with 1/2 tablet for the first 7 days and then increase to a full tablet 90 tablet 1    ferrous gluconate (FERGON) 324 (38 Fe) MG tablet Take 1 tablet (324 mg) by mouth daily (with breakfast). 60 tablet 0    hydrOXYzine lawrence (VISTARIL) 25 MG capsule Take 1 capsule (25 mg) by mouth 3 times daily as needed for anxiety. 90 capsule 1    ibuprofen (ADVIL/MOTRIN) 600 MG tablet Take 1 tablet (600 mg) by mouth every 6 hours as needed for  moderate pain. Start after delivery 60 tablet 0    NIFEdipine ER OSMOTIC (PROCARDIA XL) 30 MG 24 hr tablet Take 1 tablet (30 mg) by mouth daily. 30 tablet 0    PARoxetine (PAXIL) 10 MG tablet Take one quarter of a tablet (2.5mg) for 7 days then increase to 0.5 tablet (5mg) and continue this dose until next visit. (Patient not taking: Reported on 2/4/2025) 30 tablet 1     No current facility-administered medications for this visit.        Medication Adherence:     Patient reports taking/not taking prescription medications as prescribed:   taking psychiatric medications as prescribed.     Substance Use:      Patient denies using alcohol.   Patient denies using tobacco  Patient denies using cannabis.   Patient denies using caffeine.   Patient reports using/abusing the following substance(s). Patient denies any history of substance use.      Substance Use: No symptoms     Based on the negative CAGE score and clinical interview there  are not indications of drug or alcohol abuse.        Significant Losses / Trauma / Abuse / Neglect Issues:     Pt states that her Mom was in a bad car accident when she was 14 and she had to fill in and transport younger siblings and take on a lot of the parenting responsibilites   Issues of possible neglect are not present.           Mental Status Assessment:     Appearance:   Appropriate    Eye Contact:   Good    Psychomotor Behavior: Normal    Attitude:   Cooperative    Orientation:   All   Speech Rate / Production: Normal    Volume:   Normal    Mood:    Normal   Affect:    Appropriate    Thought Content:  Clear    Thought Form:  Coherent  Logical    Insight:    Good          Safety Assessment:     Patient denies a history of suicidal ideation, suicide attempts, self-injurious behavior, homicidal ideation, homicidal behavior, and and other safety concerns   Patient denies current or recent suicidal ideation or behaviors.   Patient denies current or recent homicidal ideation or behaviors.    Patient denies current or recent self injurious behavior or ideation.   Patient denies other safety concerns.   Patient reports there are/are not firearms in the house   ;  no firearms in the house   Protective Factors  ;   Children in the home , Sense of responsibility to family, Positive problem-solving skills, and Positive social support    Risk Factors Lack of sleep    Plan for Safety and Risk Management:     Safety and Risk: Recommended that patient call 911 or go to the local ED should there be a change in any of these risk factors.          Report to child / adult protection services was NA.        Diagnostic Criteria:     Adjustment Disorder  A. The development of emotional or behavioral symptoms in response to an identifiable stressor(s) occurring within 3 months of the onset of the stressor(s)  B. These symptoms or behaviors are clinically significant, as evidenced by one or both of the following:       - Significant impairment in social, occupational, or other important areas of functioning  C. The stress-related disturbance does not meet criteria for another disorder & is not not an exacerbation of another mental disorder  D. The symptoms do not represent normal bereavement  E. Once the stressor or its consequences have terminated, the symptoms do not persist for more than an additional 6 months       * Adjustment Disorder with Anxiety: The predominant manfestations are symptoms such as nervousness, worry, or jitteriness, or, in children separation anxiety from major attachment figures     DSM5 Diagnoses:      Diagnoses: Adjustment Disorders  309.24 (F43.22) With anxiety   Psychosocial / Contextual Factors:  Postpartum         Preliminary Treatment Plan:     It is expected that patient will need less than 10 psychotherapy sessions in the next 12 months, as they have received less than 10 in the previous year.     Chemical dependency recommendations: No indications of CD issues     As a preliminary  treatment goal, patient will experience a reduction in anxiety and will develop more effective coping skills to manage anxiety symptoms.     Collaboration with other professionals is not indicated at this time.     Referral to another professional/service is not indicated at this time..     A Release of Information is not needed at this time.             Bessy Stahl, [facility]   February 5, 2025

## 2025-02-06 ENCOUNTER — VIRTUAL VISIT (OUTPATIENT)
Dept: BEHAVIORAL HEALTH | Facility: CLINIC | Age: 25
End: 2025-02-06
Payer: COMMERCIAL

## 2025-02-06 DIAGNOSIS — F43.22 ADJUSTMENT DISORDER WITH ANXIETY: Primary | ICD-10-CM

## 2025-02-13 ENCOUNTER — DOCUMENTATION ONLY (OUTPATIENT)
Dept: PEDIATRICS | Facility: CLINIC | Age: 25
End: 2025-02-13
Payer: COMMERCIAL

## 2025-02-13 NOTE — PROGRESS NOTES
OB Progress Note     CC: Post-partum visit     HPI: Phoebe Morris is a 24 year old  who presents for a 3 week post-partum mood check. She had an primary  section which was complicated by triple I in labor as well as gestational hypertension but her post-op course was uncomplicated. She has a history of anxiety and was not on medication during the pregnancy but was seen by psychiatry shortly after discharge and prescribed paxil but was concerned about starting after reading more about the medication online and is especially concerned about discontinuation side effects. She did try Sertraline prior to hospital discharge but had an adverse effect with increased anxiety so did not continue this on discharge. Her main concern is related to anxiety and stressing about if she is doing enough or making the right decisions in caring for baby. Denies intrustive thoughts. No thoughts of self harm or harming others but has also felt down and tearful. She is seeing a therapist as well and has weekly visits scheduled.     She feels like she is recovering well physically from the CS. Pain improving and lochia down trending. Normal bowel and bladder function. Baby breastfeeding and this is going ok but she did think she had mastitis yesterday with a firm area consistent with a clogged duct on the left, erythema, and pain. She also felt flu-like but no documented fever. She continued to breast feed on both sides and this is improved today.      OB History    Para Term  AB Living   1 1 1 0 0 1   SAB IAB Ectopic Multiple Live Births   0 0 0 0 1      # Outcome Date GA Lbr Napoleon/2nd Weight Sex Type Anes PTL Lv   1 Term 23 39w1d  3.41 kg (7 lb 8.3 oz) F CS-LTranv   ORLY      Name: Female-Phoebe Morris      Apgar1: 8  Apgar5: 9       O:    Vitals:    25 1528   BP: 119/81   BP Location: Left arm   Patient Position: Sitting   Cuff Size: Adult Large   Pulse: 72   Weight: 96.2 kg (212 lb)        Gen: NAD  Breast: no masses, erythema or lymphadenopathy   Abdomen: soft, non distended, non tender  Incision: healing well   Pelvic: deferred   Ext: non-tender, no edema    PATIENT HEALTH QUESTIONNAIRE-9 (PHQ - 9)    Over the last 2 weeks, how often have you been bothered by any of the following problems?    1. Little interest or pleasure in doing things -  (Patient-Rptd) Several days   2. Feeling down, depressed, or hopeless -  (Patient-Rptd) Several days   3. Trouble falling or staying asleep, or sleeping too much - (Patient-Rptd) Several days   4. Feeling tired or having little energy -  (Patient-Rptd) Several days   5. Poor appetite or overeating -  (Patient-Rptd) More than half the days   6. Feeling bad about yourself - or that you are a failure or have let yourself or your family down -  (Patient-Rptd) Several days   7. Trouble concentrating on things, such as reading the newspaper or watching television - (Patient-Rptd) Several days   8. Moving or speaking so slowly that other people could have noticed? Or the opposite - being so fidgety or restless that you have been moving around a lot more than usual (Patient-Rptd) Not at all   9. Thoughts that you would be better off dead or of hurting  yourself in some way (Patient-Rptd) Not at all   Total Score: (Patient-Rptd) 8     If you checked off any problems, how difficult have these problems made it for you to do your work, take care of things at home, or get along with other people?      Developed by Jose R Burt, Julia An, Elias Maloney and colleagues, with an educational joelle from [employer] No permission required to reproduce, translate, display or distribute. permission required to reproduce, translate, display or distribute.         6/27/2024     4:48 PM 1/21/2025     2:38 PM 2/4/2025    12:22 PM   GISEL-7 SCORE   Total Score  17 (severe anxiety) 15 (severe anxiety)   Total Score 0 17  15        Patient-reported             A/P:    Phoebe Morris is a 24 year old  who presents for post-partum mood check   (F41.1) GISEL (generalized anxiety disorder)  (primary encounter diagnosis)  -She was prescribed Paxil by psychiatry but has not started due to concerns with more difficulty with discontinuation as well as less data on breast feeding. She had tried sertraline after delivery prior to hospital discharge but had an adverse reaction with increased panic symptoms so this was stopped. We reviewed overall safety in breastfeeding with different SSRIs but that we could try Lexapro which has more data on use in breastfeeding if desired and also less likely to require a prolonged taper than paxil with discontinuation.   -Recommend that she continue to follow with weekly therapy and psychiatry follow up   Plan: escitalopram (LEXAPRO) 10 MG tablet            (N61.0) Mastitis  -We discussed that the majority of mastitis is inflammatory and resolves without antibiotics. If the erythema and pain return, I recommend that she continue supportive measures like she did yesterday (warm compress, massage, NSAIDs, continued breast feeding) but if not resolved in 24 hours, then we would recommend starting antibiotics. Rx for dicloxacillin sent for patient to have on hand just in case. Discussed that she should notify us if concern for mastitis and if she is starting the dicloxacillin.   Plan: dicloxacillin (DYNAPEN) 500 MG capsule          Dispo: RTC in 2-3 weeks or sooner PRN for full post-partum visit     Kailee Benz MD   25

## 2025-02-13 NOTE — PROGRESS NOTES
"[facility] Lactation Visit     Assessment:      difficulty in feeding at breast     Luisana is doing well with exclusive breast feeding and is now 23% above birth weight. She gained about 1 oz per day over the past 2 weeks. She has had some fussiness with latching recently - I suspect that this is due to mom's fast flow as she continues to have an oversupply. We discussed positioning that will help Luisana latch more comfortably, as well as continuing to avoid doing much pumping in addition to nursing in order to help Phoebe's milk supply regulate. We did talk through ways to help Phoebe get breaks from breastfeeding while also maintaining her milk supply by pumping as well as what it would look like to incorporate some formula into their current routines. Luisana will follow up with another lactation appointment just as needed.            Plan:        Patient Instructions   Betsy Tristan - Get your baby to sleep the baby sleep  way        Continue to breastfeed on demand, at least 8 times a day. Many breast fed babies nurse 12 times per day or more.      Offer both sides every time, and alternate which breast you start on. If she's happy after one side only, this is fine. Latch baby deeply by making a U-shaped \"breast sandwich,\" and aim your nipple for the roof of the mouth. If baby's lips are rolled inward, flip the top lip out with your finger, and then apply gentle downward pressure to the chin to help the lips flange out like \"fish lips.\" If you have pain that lasts beyond the initial latch-on, always restart. A sidelying position might work best for her to help slow down the flow of milk. You can let some of your milk flow into a burp cloth if needed until it slows down - then latch her back on.      Supplementation plan: No need for routine supplementation. Fine to give her one bottle of expressed breast milk overnight so that you can sleep. I would avoid going more than " "6 hours without nursing or pumping for now in order to avoid clogged ducts/mastitis.        Recommended to pump: Pump once per day in order to provide one bottle for her. No need to pump beyond this.     Expect at least 6 wet diapers per day.      It is recommended for all breast fed infants to take Vitamin D 400 IU (10 mcg) daily. This is available over the counter in a concentrated drop (my favorite as it is easier to give) or a 1 ml daily dose.      Return in about 2 weeks (around 2/27/2025) for As needed for ongoing lactation support .     Average Infant Milk Intake by Age     Age Average milk volume per feeding (mL) Average milk volume per feeding (oz) Average 24 hour milk intake (mL) Average 24 hour milk intake (oz)   Day 1 Few drops - 5mL < tsp Up to 30 mL Up to 1 oz   Day 2 5 - 15 mL <0.5 oz - 1 TB 30 - 120 mL 1 - 4 oz   Day 3 15 - 30 mL  0.5 - 1 oz 120 - 240 mL 4 - 8 oz   Day 4 30 - 45 mL  1 - 1.5 oz 240 - 360 mL 8 - 12 oz   Day 5-7 45 - 60 mL 1.5 - 2 oz 360 - 600 mL 12 - 18 oz   Week 2-3 60 - 90 mL 2 - 3 oz 450 - 750 mL 15 - 25 oz   Months 1-6 90 - 150 mL 3 - 5 oz 750 - 1035 mL 25 - 35 oz      Luisana transferred 2 oz from the R side, 1 oz from the L side (3 oz total)        [email or web address].[email or web address]        Clogged ducts can be painful and if not relieved can become infected, causing mastitis.      Strategies to help relieve a clogged duct:      -Continue to feed your baby on demand  -Avoid excessive breast pump use as this may cause your supply to increase and worsen the problem  -Wear an appropriately fitting supportive bra  -Avoid tight, restrictive clothing - sometimes spaghetti straps can cause a clog to develop.   -Avoid deep massage  -Apply ice to the affected area a few times per day or up to once an hour  -Take ibuprofen to help decrease inflammation  -Very gentle massage \"like petting a cat\" to help with lymphatic drainage  -\"Breast lift\" - this is where you lay on your back for up to " "40 minutes (watch a TV show!) And gently lift your breast into the air, rotating where you hold it. This is similar to elevating a sprained ankle and will help reduce the swelling   -Sunflower lecithin can help treat a clogged duct while you're experiencing it, or reduce the likelihood of recurrent clogged ducts. The recommended dose for a current plugged duct is 5000 - 84951 mg lecithin per day, or one 1200 mg capsule 3-8 times per day. Spread out the pills throughout the day. Once the clog has been relieved and things are going well you can gradually reduce the daily dose as tolerated.   -Look for a \"bleb\" on the nipple (these are often painful and look like white heads). Application of a topical moderate potency steroid cream such as 0.1% triamcinolone may be used to reduce inflammation on the surface of the nipple. Reach out to your health care provider for a prescription if needed. This is safe with breastfeeding and can be wiped off with a tissue or towel before feeding your baby.      A clogged area may continue to be tender for a few days even after the clog has been relieved.      Call your provider if you develop signs of mastitis - chills, body aches, fever accompanied by a clogged duct that is firm, warm and tender.                   A clogged area may continue to be tender for a few days even after the clog has been relieved.      Call your provider if you develop signs of mastitis - chills, body aches, fever accompanied by a clogged duct that is firm, warm and tender.                   From \"the Period of PURPLE Crying\" Website - See http://www.purplecrying.info/ for more information.      The Period of PURPLE Crying begins at about 2 weeks of age and continues until about 3-4 months of age. There are other common characteristics of this phase, or period, which are better described by the acronym PURPLE. All babies go through this period. It is during this time that some babies can cry a lot and some " "far less, but they all go through it.      Scientists decided to look at different animal species to see if they go through this developmental stage.  So far, all breast feeding animals tested do have a similar developmental stage of crying more   in the first months of life as human babiesdo.         P- Peak of crying - your baby may cry more each week, the most in month 2, then less in months 3-5.   U- Unexpected - Crying can come and go and you don't know why  R-Resists Soothing - Your baby may not stop crying no matter what you try  P- Pain-like face - A crying baby may look like they are in pain, even when they are not   L- Long lasting - Crying can last as muchas 5 hours a day, or more  E- Evening - Your baby may cry more in the late afternoon and evening.         There are things you can do to help your baby learn to sleep well.      My favorite book on sleep is \"Healthy Sleep Habits, Happy Child\" by Dr. Collin Olivera. It is long but evidence-based and full of helpful information. There is a \"summary for exhausted parents\" at the end of each chapter.      Sleep tips:   1. Start early trying to put your baby down to sleep while drowsy but awake. Watch your baby for sleepy cues and then put them down to sleep before they have completely fallen asleep in your arms. This may not work every time you try, and that is ok. Just try again at the next nap or bedtime. Some babies will cry or whimper softly before falling asleep on their own, this is not the same thing as \"cry it out.\"      2. When your baby wakes during the night, you can pause briefly before you get up to feed them. Some babies will make a little noise or cry briefly and then put themselves back to sleep. When you do get up with your baby, keep interactions quiet and brief. Keep lights very dim. A red nightlight can help keep everyone in \"nighttime\" mode. Bright lights during the day will help baby sort out days and nights.       3. Start a " consistent bedtime routine early on, as early as 6 weeks. A quick bath, putting on pajamas, and feeding is a common bedtime routine. Try to keep the lights low and the room quiet before bedtime. The routine you choose is less important than that it is the same every day. Use an abbreviated form of the bedtime routine when you put your baby down for a nap.      4. Around 6 weeks, many babies are ready for an early bedtime. Some parents believe that keeping their baby up later will help them sleep longer, but this will often backfire. By 4 months nearly all babies can benefit from an early bedtime between 6 and 8 pm.      5. Sleep begets sleep. If your baby is able to have good naps during the day, night sleep will often go better.      6. Get help! Taking care of a baby is very hard, and when you are sleep deprived, everything is harder! Postpartum depression and sleep deprivation are closely linked. Do what you can to protect your own sleep, like napping during the day, going to bed early, and sharing responsibilities with another caregiver.                       Return in about 2 weeks (around 2025) for As needed for ongoing lactation support .        SUBJECTIVE:      Luisana is here today with mom, Phoebe, and dad, Devyn, for lactation support. She is a 4 week old female born at Gestational Age: 39w1d now 28 days.    She is doing well. She has gained 13.4 oz since last visit 14 days ago. She has gained approximately 1 oz per day over the past 14 days and is now 23% from birth weight.   .     [facility] Lactation Visit Questionnaire        Question 2025  1:43 PM CST - Filed by Patient   What is your main concern today? lactation   Your baby's first name: luisana   Your baby's last name: bryson   Type of Birth    Your doctor/midwife: shyam   Baby's doctor or nurse practitioner: michael   Baby's birthday: 2027   Birth weight: 7lb8oz   Baby's weight just before leaving the hospital:     Baby's  most recent weight: 8lb9oz   Date: 2 weeks ago   How often does your baby eat? every 2 to 3 hours   How long does each feeding last? 10 to 20 mins   How much of the time does your baby take both breasts when nursing? 25   Can you hear the baby swallowing during nursing? Yes   How many times does your baby feed in 24 hours? 10   How many times does your baby urinate (pee) in 24 hours? 8   How many stools (poops) does your baby have in 24 hours? 5   Describe the color and consistency of the poop: yellow seedy   Do you give your baby extra milk in addition to or instead of breastfeeding? No   How much extra do you usually give?     How do you give extra milk?     Are you pumping your breasts? No   When you were pregnant did your breasts grow larger? Yes   Did your areola (the dark area around your nipple) grow larger or darker? Yes   Did you notice your breasts fill when your baby was 3-5 days old? Yes   Have you had any breast surgeries? No   Please select any of the following medical conditions you have been previously diagnosed with or are currently being treated for: Depression     Anxiety   What else would you like the lactation consultant to know?        She has gone a maximum of 6 hours overnight between feeds. Typically she goes 3 -4 hours between feeds.      Breastfeeding Goals: Mixed formula and breast feeding. Mom would like some flexibility between nursing and bottle feeding.        Previous Breastfeeding Experience: First baby  Breast-surgery: None  Maternal medications: see above         Hospital course:  Mom had triple I and was on antibiotic for 24 hours. Luisana delivered by emergency . Procedure itself went well, she was not  from mom and did not have a NICU stay. Latched right away, bottle fed based on cues in the hospital. Pumping in the hospital, this went well, made a good amount of milk right away.         No results found for any visits on 25.    Current Medications     "  Current Outpatient Medications:     cholecalciferol (D-VI-SOL, VITAMIN D3) 10 mcg/mL (400 units/mL) LIQD liquid, Take 1 mL (10 mcg) by mouth daily., Disp: 50 mL, Rfl: 11     Past Medical History   No past medical history on file.     Past Surgical History   No past surgical history on file.     Family History   No family history on file.           Primary care provider: Keiry Peralta     OBJECTIVE:     Mother:   Nipples are everted, the areola is compressible, the breast is soft and full.      Sore nipples: None   Maternal depression screening: Doing well  EPDS: Referral to maternal PCP not made     Infant:      Age today: 28 days     Pulse 138   Resp 36   Ht 1' 9.75\" (0.552 m)   Wt 9 lb 4.4 oz (4.207 kg)   HC 14.5\" (36.8 cm)   BMI 13.78 kg/m          Weight:       Wt Readings from Last 3 Encounters:   02/13/25 9 lb 4.4 oz (4.207 kg) (57%, Z= 0.17)*   01/30/25 8 lb 7 oz (3.827 kg) (62%, Z= 0.30)*   01/27/25 8 lb 1.5 oz (3.671 kg) (57%, Z= 0.19)*      * Growth percentiles are based on WHO (Girls, 0-2 years) data.         Birthweight:  7 lbs 8.28 oz.   Today's weight:  9 lbs 4.4 oz This is 23% from birth weight.         Test weights:        LEFT side: 1 oz  RIGHT side: 2 oz     TOTAL transfer:  3 oz         Feeding assessment:      Digital suck assessment:  Infant draws consultant's finger into mouth, palate intact, tongue over gums, normal frenulum.      Baby can hold suction with tongue while at the breast.      Alignment: Baby's head was aligned with its trunk. Baby did face mother. Baby was in cross cradle position today.      Areolar Grasp: Baby was able to open mouth wide. Baby's lips were not pursed. Baby's lips did flange outward. Tongue was visible just barely over bottom lip. Baby had complete seal.      Areolar Compression: Baby made rhythmic motion. There were no clicking or smacking sounds. There was no severe nipple discomfort.  Nipples appeared round after feeding.     Audible swallowing: Baby " made quiet sounds of swallowing: There was an increase in frequency after milk ejection reflex. The milk ejection reflex is appropriate and milk supply appears adequate.      PHYSICAL EXAM     Gen: Alert, no acute distress.   Head: Anterior fontanelle flat and soft.   Mouth:Lips pink. Oral mucosa moist. Tongue midline (good lateralization, movement, and lift; able to extend pass lower gumline).  Palate intact. Coordinated suck.  Lungs: Clear to auscultation bilaterally.   Cardiac: Regular regular rate and rhythm, S1S2, no murmurs.  Abdomen: Soft, nontender, bowel sounds present, no hepatosplenomegaly or mass palpable. Umbilicus dry with no erythema or drainage.   : Tristan stage 1 female genitalia  Skin: Intact, dry, appropriate coloring for ethnicity, no jaundice.   Neuro: Appropriate muscle tone.     The visit lasted a total of 50 minutes that I spent on this visit today. This time includes pre-charting, review of the chart, and face to face time with the patient.      Completed by:   CARLTON Velez, IBCLC, [facility], Pediatrics.  2/13/2025 1:57 PM

## 2025-02-19 ENCOUNTER — MYC REFILL (OUTPATIENT)
Dept: MATERNAL FETAL MEDICINE | Facility: CLINIC | Age: 25
End: 2025-02-19
Payer: COMMERCIAL

## 2025-02-19 RX ORDER — NIFEDIPINE 30 MG/1
30 TABLET, EXTENDED RELEASE ORAL DAILY
Qty: 30 TABLET | Refills: 0 | Status: SHIPPED | OUTPATIENT
Start: 2025-02-19

## 2025-02-19 NOTE — PROGRESS NOTES
[facility]: Integrated Behavioral Health    Behavioral Health Clinician Progress Note   Mental Health & Addiction Services          Patient Name: Phoebe Morris       Service Type:  Individual   Service Location:  MyChart / Email (patient reached)   Visit Start Time: 2:08 PM  Visit End Time:  2:41 PM   Session Length: 16 - 37    Attendees: Client   Service Modality: Video Visit:      Provider verified identity through the following two step process.  Patient provided:  Patient  and Patient address    Telemedicine Visit: The patient's condition can be safely assessed and treated via synchronous audio and visual telemedicine encounter.      Reason for Telemedicine Visit: Patient has requested telehealth visit    Originating Site (Patient Location): Patient's home    Distant Site [place] [facility] [place]    Consent:  The patient/guardian has verbally consented to: the potential risks and benefits of telemedicine (video visit) versus in person care; bill my insurance or make self-payment for services provided; and responsibility for payment of non-covered services.     Patient would like the video invitation sent by:  My Chart    Mode of Communication:  Video Conference via Amwell    Distant Location (Provider):  On-site    As the provider I attest to compliance with applicable laws and regulations related to telemedicine.   Visit number: 4    [facility] Visit Activities (Refresh list every visit): [facility] Only     Date of Brief Diagnostic Assessment : 25  Treatment Plan Review Date: NA      DATA:    Extended Session (60+ minutes): No   Interactive Complexity: No   Crisis: No   [facility] Patient: No     Assessments completed prior to visit:   The following assessments were completed by patient for this visit:  PHQ9:       2017     8:08 AM 2021    12:53 PM 2024     4:48 PM 2025     2:32 PM 2025     7:48 PM 2025     12:21 PM   PHQ-9 SCORE   PHQ-9 Total Score MyChart  2 (Minimal depression)  3 (Minimal depression) 9 (Mild depression) 8 (Mild depression)   PHQ-9 Total Score 1 2 1 3  9  8        Patient-reported     GAD7:       12/7/2017     8:08 AM 6/22/2021    12:59 PM 6/27/2024     4:48 PM 1/21/2025     2:38 PM 2/4/2025    12:22 PM   GISEL-7 SCORE   Total Score  3 (minimal anxiety)  17 (severe anxiety) 15 (severe anxiety)   Total Score 1 3 0 17  15        Patient-reported     CAGE-AID:       6/22/2021     1:20 PM 1/21/2025     2:38 PM   CAGE-AID Total Score   Total Score  0    Total Score MyChart  0 (A total score of 2 or greater is considered clinically significant)       Information is confidential and restricted. Go to Review Flowsheets to unlock data.    Patient-reported     [place] Suicide Severity Rating Scale (Lifetime/Recent)      7/3/2024     3:44 PM 12/10/2024    12:21 PM 1/8/2025     7:00 PM 1/15/2025     3:16 AM 1/16/2025     9:00 AM 1/22/2025     9:33 AM   [place] Suicide Severity Rating (Lifetime/Recent)   Q1 Wished to be Dead (Past Month) 0-->no 0-->no 0-->no 0-->no 0-->no    Q2 Suicidal Thoughts (Past Month) 0-->no 0-->no 0-->no 0-->no 0-->no    Q6 Suicide Behavior (Lifetime) 0-->no 0-->no 0-->no 0-->no 0-->no    Level of Risk per Screen no risks indicated no risks indicated no risks indicated no risks indicated no risks indicated    1. Wish to be Dead (Lifetime)      N   2. Non-Specific Active Suicidal Thoughts (Lifetime)      N   Actual Attempt (Lifetime)      N   Has subject engaged in non-suicidal self-injurious behavior? (Lifetime)      N   Interrupted Attempts (Lifetime)      N   Aborted or Self-Interrupted Attempt (Lifetime)      N   Preparatory Acts or Behavior (Lifetime)      N   Calculated C-SSRS Risk Score (Lifetime/Recent)      No Risk Indicated        Reason for Visit/Presenting Concern:  Anxiety    Current Stressors / Issues:   Anxiety has been better and feels like Lexapro is helping.  Started mom  group last week with [employer] and plans to get together outside of class another time each week. Pt states she is as comfortable as she can be.  goes back to work March 3rd and will be a transition but pt noticing that she is also less stressed sometimes when he isn't around.  Pt is having a little easier time accepting change and is able to accept that there will be an end to it.     Therapeutic Interventions:  Cognitive Behavioral Therapy (CBT): Provided psycho-education on cognitive distortions., Identified behavioral changes that can be made to move towards treatment goals. , Assisted patient in developing coping strategies (e.g. more physical exercise, less internal focus, increase social involvement, more assertiveness, etc.)., and Reinforced successes reported by patient since last appointment.  Psycho-education: Provided psycho-education about patient's behavioral health condition and symptoms. Explained and reviewed treatment options.    Response to treatment interventions:   Patient was receptive to interventions utilized.  Patient was engaged in the therapy process.       Progress on Treatment Objective(s) / Homework:   Stable - ACTION (Actively working towards change); Intervened by reinforcing change plan / affirming steps taken     Medication Review:   No changes to current psychiatric medication(s)     Medication Compliance:   Yes     Chemical Use Review:  Substance Use: Chemical use reviewed, no active concerns identified      Tobacco Use: No current tobacco use.       Assessment: Current Emotional / Mental Status (status of significant symptoms):    Risk status (Self / Other harm or suicidal ideation)   Patient denies a history of suicidal ideation, suicide attempts, self-injurious behavior, homicidal ideation, homicidal behavior, and and other safety concerns   Patient denies current fears or concerns for personal safety.   Patient denies current or recent suicidal ideation or behaviors.    Patient denies current or recent homicidal ideation or behaviors.   Patient denies current or recent self injurious behavior or ideation.   Patient denies other safety concerns.   Recommended that patient call 911 or go to the local ED should there be a change in any of these risk factors      ASSESSMENT:   Mental Status:     Appearance:   Appropriate    Eye Contact:   Good    Psychomotor Behavior: Normal    Attitude:   Interested Friendly   Orientation:   All   Speech Rate / Production: Normal    Volume:   Normal    Mood:    Normal   Affect:    Appropriate    Thought Content:  Clear    Thought Form:  Coherent  Logical    Insight:    Good          Diagnoses:   Data Unavailable    Collateral Reports Completed:   Not Applicable       Plan: (Homework, other):   Patient was provided No indications of CD issues  Patient was given information about behavioral services and encouraged to schedule a follow up appointment with the clinic [facility] [place] in 2 weeks.         Bessy [facility]     _____________________________________________________________________________________________________________________________________                                              Individual Treatment Plan    Patient's Name: Phoebe Morris   YOB: 2000  Date of Creation: 2/20/25  Date Treatment Plan Last Reviewed/Revised: NA    DSM5 Diagnoses: Adjustment Disorders  309.24 (F43.22) With anxiety  Psychosocial / Contextual Factors:  Postpartum  PROMIS (reviewed every 90 days):   The following assessments were completed by patient for this visit:  PHQ9:       12/7/2017     8:08 AM 6/22/2021    12:53 PM 6/27/2024     4:48 PM 1/21/2025     2:32 PM 1/30/2025     7:48 PM 2/4/2025    12:21 PM   PHQ-9 SCORE   PHQ-9 Total Score MyChart  2 (Minimal depression)  3 (Minimal depression) 9 (Mild depression) 8 (Mild depression)   PHQ-9 Total Score 1 2 1 3  9  8        Patient-reported     GAD7:       12/7/2017     8:08 AM 6/22/2021    12:59 PM  6/27/2024     4:48 PM 1/21/2025     2:38 PM 2/4/2025    12:22 PM   GISEL-7 SCORE   Total Score  3 (minimal anxiety)  17 (severe anxiety) 15 (severe anxiety)   Total Score 1 3 0 17  15        Patient-reported     CAGE-AID:       6/22/2021     1:20 PM 1/21/2025     2:38 PM   CAGE-AID Total Score   Total Score  0    Total Score MyChart  0 (A total score of 2 or greater is considered clinically significant)       Information is confidential and restricted. Go to Review Flowsheets to unlock data.    Patient-reported     PROMIS 10-Global Health (all questions and answers displayed):       9/6/2023     1:05 PM 1/7/2024    11:12 AM 4/24/2024    10:13 AM 1/30/2025     7:59 PM 2/6/2025    10:45 AM   PROMIS 10   In general, would you say your health is: Excellent Very good Excellent Very good Very good   In general, would you say your quality of life is: Excellent Very good Excellent Very good Very good   In general, how would you rate your physical health? Excellent Very good Excellent Very good Very good   In general, how would you rate your mental health, including your mood and your ability to think? Excellent Very good Very good Good Good   In general, how would you rate your satisfaction with your social activities and relationships? Excellent Very good Very good Very good Good   In general, please rate how well you carry out your usual social activities and roles Excellent Very good Very good Good Good   To what extent are you able to carry out your everyday physical activities such as walking, climbing stairs, carrying groceries, or moving a chair? Completely Completely Completely Completely Completely   In the past 7 days, how often have you been bothered by emotional problems such as feeling anxious, depressed, or irritable? Sometimes Rarely Sometimes Always Often   In the past 7 days, how would you rate your fatigue on average? None None Mild Mild Moderate   In the past 7 days, how would you rate your pain on average,  where 0 means no pain, and 10 means worst imaginable pain? 0 1 0 2 0   In general, would you say your health is: 5 4 5 4 4   In general, would you say your quality of life is: 5 4 5 4 4   In general, how would you rate your physical health? 5 4 5 4 4   In general, how would you rate your mental health, including your mood and your ability to think? 5 4 4 3 3   In general, how would you rate your satisfaction with your social activities and relationships? 5 4 4 4 3   In general, please rate how well you carry out your usual social activities and roles. (This includes activities at home, at work and in your community, and responsibilities as a parent, child, spouse, employee, friend, etc.) 5 4 4 3 3   To what extent are you able to carry out your everyday physical activities such as walking, climbing stairs, carrying groceries, or moving a chair? 5 5 5 5 5   In the past 7 days, how often have you been bothered by emotional problems such as feeling anxious, depressed, or irritable? 3 2 3 5 4   In the past 7 days, how would you rate your fatigue on average? 1 1 2 2 3   In the past 7 days, how would you rate your pain on average, where 0 means no pain, and 10 means worst imaginable pain? 0 1 0 2 0   Global Mental Health Score 18 16 16 12  12    Global Physical Health Score 20 18 19 17  17    PROMIS TOTAL - SUBSCORES 38 34 35 29  29        Patient-reported     [place] Suicide Severity Rating Scale (Lifetime/Recent)      7/3/2024     3:44 PM 12/10/2024    12:21 PM 1/8/2025     7:00 PM 1/15/2025     3:16 AM 1/16/2025     9:00 AM 1/22/2025     9:33 AM   [place] Suicide Severity Rating (Lifetime/Recent)   Q1 Wished to be Dead (Past Month) 0-->no 0-->no 0-->no 0-->no 0-->no    Q2 Suicidal Thoughts (Past Month) 0-->no 0-->no 0-->no 0-->no 0-->no    Q6 Suicide Behavior (Lifetime) 0-->no 0-->no 0-->no 0-->no 0-->no    Level of Risk per Screen no risks indicated no risks indicated no risks indicated no risks indicated no risks  indicated    1. Wish to be Dead (Lifetime)      N   2. Non-Specific Active Suicidal Thoughts (Lifetime)      N   Actual Attempt (Lifetime)      N   Has subject engaged in non-suicidal self-injurious behavior? (Lifetime)      N   Interrupted Attempts (Lifetime)      N   Aborted or Self-Interrupted Attempt (Lifetime)      N   Preparatory Acts or Behavior (Lifetime)      N   Calculated C-SSRS Risk Score (Lifetime/Recent)      No Risk Indicated        Referral / Collaboration:  Referral to another professional/service is not indicated at this time..    Anticipated number of session for this episode of care:  9-12 sessions  Anticipation frequency of session: Biweekly  Anticipated Duration of each session: 16-37 minutes  Treatment plan will be reviewed in 90 days or when goals have been changed.       MeasurableTreatment Goal(s) related to diagnosis / functional impairment(s)  Goal 1: Patient will improve management of anxiety symptoms as evidenced by reduced frequency, intensity and duration of anxiety symptoms. learn and implement coping skills that result in a reduction of anxiety and nervousness. recognize contributing factors of anxiety and identify ways to intervene.    I will know I've met my goal when I am able to adapt to changes in routine without causing increased stress, anxiety and worry.      Status: New - Date: 2/20/25      Intervention(s)  [facility] will Cognitive Behavioral Therapy (CBT): Provide psycho-education on cognitive distortions., Identify behavioral changes that can be made to move towards treatment goals. , Assist patient in developing coping strategies (e.g. more physical exercise, less internal focus, increase social involvement, more assertiveness, etc.)., and Reinforce successes reported by patient since last appointment.  Acceptance and Commitment Therapy (ACT): Utilize mindfulness strategies to help the patient become more able to be present to difficult aspects of their experience and make  decisions consistent with their values. Work with patient to practice acceptance: making room for painful feelings, urges and sensations, and allowing them to come and go without a struggle.         Patient has reviewed and agreed to the above plan.     Written by  DEB Santana

## 2025-02-20 ENCOUNTER — VIRTUAL VISIT (OUTPATIENT)
Dept: BEHAVIORAL HEALTH | Facility: CLINIC | Age: 25
End: 2025-02-20
Payer: COMMERCIAL

## 2025-02-20 DIAGNOSIS — F43.22 ADJUSTMENT DISORDER WITH ANXIETY: Primary | ICD-10-CM

## 2025-02-24 ENCOUNTER — TELEPHONE (OUTPATIENT)
Dept: MATERNAL FETAL MEDICINE | Facility: CLINIC | Age: 25
End: 2025-02-24
Payer: COMMERCIAL

## 2025-02-24 DIAGNOSIS — O26.892 LOW BACK PAIN DURING PREGNANCY IN SECOND TRIMESTER: ICD-10-CM

## 2025-02-24 DIAGNOSIS — O21.9 NAUSEA/VOMITING IN PREGNANCY: Primary | ICD-10-CM

## 2025-02-24 DIAGNOSIS — M54.50 LOW BACK PAIN DURING PREGNANCY IN SECOND TRIMESTER: ICD-10-CM

## 2025-02-24 NOTE — TELEPHONE ENCOUNTER
Home Observation of Postpartum Elevated BP (HOPE-BP) Program     Phoebe Morris enrolled in the HOPE-BP Program on 1/19/2025, 36 days ago. Delivered on 1/16/2025. Diagnosis: Gestational hypertension. Medication(s) prescribed: Nifedipine ER.  Patient reported the following blood pressures in the past 72 hours:       2/17/2025 2/18/2025 2/19/2025 2/20/2025 2/21/2025 2/22/2025 2/23/2025   [facility] Health Trends BP Review Flowsheet   Systolic (Patient Reported) 120    118 127 127    118 125    116 124    112 116    107 109   Diastolic (Patient Reported) 82    83 75 80    81 75    80 81    81 78    79 79       Multiple values from one day are sorted in reverse-chronological order     Spoke with patient regarding low normal BP readings. Pt feels well, denies s/sx of hypotension. Pt did take Nifedipine 30mg this morning. Plan to discontinue Nifdipine. Encour[facility] patient [facility] Pt verbalized understanding. Mediation list updated.    CLAUDE ARDON RN

## 2025-03-03 ENCOUNTER — TELEPHONE (OUTPATIENT)
Dept: MATERNAL FETAL MEDICINE | Facility: CLINIC | Age: 25
End: 2025-03-03

## 2025-03-03 ENCOUNTER — DOCUMENTATION ONLY (OUTPATIENT)
Dept: MATERNAL FETAL MEDICINE | Facility: CLINIC | Age: 25
End: 2025-03-03

## 2025-03-03 ENCOUNTER — PRENATAL OFFICE VISIT (OUTPATIENT)
Dept: OBGYN | Facility: CLINIC | Age: 25
End: 2025-03-03
Payer: COMMERCIAL

## 2025-03-03 VITALS
BODY MASS INDEX: 36.36 KG/M2 | SYSTOLIC BLOOD PRESSURE: 122 MMHG | HEART RATE: 95 BPM | DIASTOLIC BLOOD PRESSURE: 82 MMHG | WEIGHT: 211.8 LBS

## 2025-03-03 DIAGNOSIS — F41.1 GAD (GENERALIZED ANXIETY DISORDER): ICD-10-CM

## 2025-03-03 DIAGNOSIS — O13.9 GESTATIONAL HYPERTENSION, ANTEPARTUM: ICD-10-CM

## 2025-03-03 DIAGNOSIS — Z30.019 ENCOUNTER FOR FEMALE BIRTH CONTROL: ICD-10-CM

## 2025-03-03 LAB — HCG UR QL: NEGATIVE

## 2025-03-03 PROCEDURE — 3074F SYST BP LT 130 MM HG: CPT | Performed by: OBSTETRICS & GYNECOLOGY

## 2025-03-03 PROCEDURE — 81025 URINE PREGNANCY TEST: CPT | Performed by: OBSTETRICS & GYNECOLOGY

## 2025-03-03 PROCEDURE — 0503F POSTPARTUM CARE VISIT: CPT | Performed by: OBSTETRICS & GYNECOLOGY

## 2025-03-03 PROCEDURE — 99214 OFFICE O/P EST MOD 30 MIN: CPT | Performed by: OBSTETRICS & GYNECOLOGY

## 2025-03-03 PROCEDURE — 3079F DIAST BP 80-89 MM HG: CPT | Performed by: OBSTETRICS & GYNECOLOGY

## 2025-03-03 PROCEDURE — 99207 PR POST PARTUM EXAM: CPT | Performed by: OBSTETRICS & GYNECOLOGY

## 2025-03-03 RX ORDER — ESCITALOPRAM OXALATE 20 MG/1
20 TABLET ORAL DAILY
Qty: 90 TABLET | Refills: 1 | Status: SHIPPED | OUTPATIENT
Start: 2025-03-03 | End: 2025-08-30

## 2025-03-03 ASSESSMENT — PATIENT HEALTH QUESTIONNAIRE - PHQ9
SUM OF ALL RESPONSES TO PHQ QUESTIONS 1-9: 4
10. IF YOU CHECKED OFF ANY PROBLEMS, HOW DIFFICULT HAVE THESE PROBLEMS MADE IT FOR YOU TO DO YOUR WORK, TAKE CARE OF THINGS AT HOME, OR GET ALONG WITH OTHER PEOPLE: SOMEWHAT DIFFICULT
SUM OF ALL RESPONSES TO PHQ QUESTIONS 1-9: 4

## 2025-03-03 NOTE — PROGRESS NOTES
OB Progress Note     CC: Post-partum visit     HPI: Phoebe Morris is a 24 year old  who presents for a post partum visit. She had a primary CS  due to fetal intolerance to labor and triple I. Her pregnancy was also complicated by gHTN that developed during her hospital admission. She was discharged to home on Nifedipine (which has since stopped) and participated in the HOPE BP program.     She did have significant post-partum anxiety and was last seen in our office on 25 and was started on Lexapro 10 mg daily. She has also been seeing Terri ([facility]) for therapy. She feels that overall her mood is much better and anxiety is improving. Sometimes feels down/depressed when she is home alone with baby and doesn't have plans for the day to staying busy has helped. She is concerned about the anxiety getting worse again when she returns to work and would like to increase the Lexapro but overall things have been going a lot better. No intrusive thoughts or SI/HI.     Since delivery she reports that her lochia is minimal. She denies fever/chills, is voiding and tolerating PO without difficulty. She denies pain. She is breast feeding.She denies issues with bowel/bladder. She has not resumed intercourse.       OB History    Para Term  AB Living   1 1 1 0 0 1   SAB IAB Ectopic Multiple Live Births   0 0 0 0 1      # Outcome Date GA Lbr Napoleon/2nd Weight Sex Type Anes PTL Lv   1 Term 25 39w1d  3.41 kg (7 lb 8.3 oz) F CS-LTranv   ORLY      Name: Female-Phoebe Morris      Apgar1: 8  Apgar5: 9       O:    Vitals:    25 1105   BP: 122/82   BP Location: Left arm   Patient Position: Sitting   Cuff Size: Adult Large   Pulse: 95   Weight: 96.1 kg (211 lb 12.8 oz)       Gen: NAD  Abdomen: soft, non distended, non tender  Incision: well healed   Pelvic: deferred   Ext: non-tender, no edema         Depression Screen:  PATIENT HEALTH QUESTIONNAIRE-9 (PHQ - 9)    Over the last 2 weeks, how often  have you been bothered by any of the following problems?    1. Little interest or pleasure in doing things -  (Patient-Rptd) Several days   2. Feeling down, depressed, or hopeless -  (Patient-Rptd) Several days   3. Trouble falling or staying asleep, or sleeping too much - (Patient-Rptd) Not at all   4. Feeling tired or having little energy -  (Patient-Rptd) Not at all   5. Poor appetite or overeating -  (Patient-Rptd) Several days   6. Feeling bad about yourself - or that you are a failure or have let yourself or your family down -  (Patient-Rptd) Not at all   7. Trouble concentrating on things, such as reading the newspaper or watching television - (Patient-Rptd) Several days   8. Moving or speaking so slowly that other people could have noticed? Or the opposite - being so fidgety or restless that you have been moving around a lot more than usual (Patient-Rptd) Not at all   9. Thoughts that you would be better off dead or of hurting  yourself in some way (Patient-Rptd) Not at all   Total Score: (Patient-Rptd) 4     If you checked off any problems, how difficult have these problems made it for you to do your work, take care of things at home, or get along with other people?      Developed by Jose R Burt, Julia An, Elias Maloney and colleagues, with an educational joelle from [employer] No permission required to reproduce, translate, display or distribute. permission required to reproduce, translate, display or distribute.        2024     4:48 PM 2025     2:38 PM 2025    12:22 PM   GISEL-7 SCORE   Total Score  17 (severe anxiety) 15 (severe anxiety)   Total Score 0 17  15        Patient-reported          A/P:    Phoebe Morris is a 24 year old  who presents for post-partum visit  (Z39.2) Routine postpartum follow-up  (primary encounter diagnosis)  -OK to resume normal activities   -Pap smear UTD     (Z30.019) Encounter for female birth control  -Reviewed options for  contraception, patient would like IUD placed and we discussed timing and slight increased risk of uterine perforation at 6 weeks post-partum. She would like to wait an additional 2-4 weeks and feels comfortable that she will be able to keep that appointment and will use condoms for contraception if needed.     (F41.1) GISEL (generalized anxiety disorder)  -Overall mood improving, will increase lexapro to 20 mg daily and then continue therapy visits   Plan: escitalopram (LEXAPRO) 20 MG tablet            (O13.9) Gestational hypertension, antepartum  -We reviewed the diagnosis of gestational hypertension during their pregnancy/post-partum course. She was discharged with Nifedipine for blood pressure management and was followed with [facility] BP program. She is now normotensive in the office and no longer taking medication. We reviewed the increased risk of hypertensive disorders in future pregnancy and that we would recommend starting low dose aspirin with future pregnancy as well as obtaining baseline pre-eclampsia labs at her initial OB visit. We also discussed the recommended interpregnancy interval of >18 months.   -We also discussed the increased lifetime CV risks and the following recommendations   Ongoing ambulatory and in-office blood pressure surveillance with management per current ACC/AHA recommendations (goal < 120/80 mmHg)  Lipid assessment (initially once 12 weeks postpartum and post-lactation)  Annually screening for diabetes using hemoglobin A1c  Lifestyle modifications including dietary counseling (such as the DASH diet) and exercise recommendations (moderate exercise at least 30 minutes per day 5 times per week)        Dispo: RTC for IUD insertion     Kailee Benz MD   03/03/25

## 2025-03-03 NOTE — TELEPHONE ENCOUNTER
Writer called and spoke with patient. Wanted to clarify if patient is still taking Nifedipine, she reports she is no longer taking it. Notified patient her handoff letter will be sent to Dr. Benz, she is seeing her today for her 6 week PPV, patient VU.

## 2025-03-05 ENCOUNTER — VIRTUAL VISIT (OUTPATIENT)
Dept: BEHAVIORAL HEALTH | Facility: CLINIC | Age: 25
End: 2025-03-05
Payer: COMMERCIAL

## 2025-03-05 DIAGNOSIS — F43.22 ADJUSTMENT DISORDER WITH ANXIETY: Primary | ICD-10-CM

## 2025-03-05 PROCEDURE — 90832 PSYTX W PT 30 MINUTES: CPT | Mod: 95 | Performed by: SOCIAL WORKER

## 2025-03-05 NOTE — PROGRESS NOTES
[facility]: Integrated Behavioral Health    Behavioral Health Clinician Progress Note   Mental Health & Addiction Services      2025    Patient Name: Phoebe Morris       Service Type:  Individual   Service Location:  MyChart / Email (patient reached)   Visit Start Time: 11:03 AM  Visit End Time:  11:29 AM   Session Length: 16 - 37    Attendees: Client   Service Modality: Video Visit:      Provider verified identity through the following two step process.  Patient provided:  Patient  and Patient address    Telemedicine Visit: The patient's condition can be safely assessed and treated via synchronous audio and visual telemedicine encounter.      Reason for Telemedicine Visit: Patient has requested telehealth visit    Originating Site (Patient Location): Patient's home    Distant Site (Provider Location): [facility] [place]    Consent:  The patient/guardian has verbally consented to: the potential risks and benefits of telemedicine (video visit) versus in person care; bill my insurance or make self-payment for services provided; and responsibility for payment of non-covered services.     Patient would like the video invitation sent by:  My Chart    Mode of Communication:  Video Conference via [facility]    Distant Location (Provider):  On-site    As the provider I attest to compliance with applicable laws and regulations related to telemedicine.   Visit number: 5    [facility] Visit Activities (Refresh list every visit): [facility] Only     Date of Brief Diagnostic Assessment : 25  Treatment Plan Review Date: NA      DATA:    Extended Session (60+ minutes): No   Interactive Complexity: No   Crisis: No   [facility] Patient: No     Assessments completed prior to visit:   The following assessments were completed by patient for this visit:  PHQ9:       2017     8:08 AM 2021    12:53 PM 2024     4:48 PM 2025     2:32 PM 2025     7:48 PM 2025    12:21 PM  3/3/2025    10:57 AM   PHQ-9 SCORE   PHQ-9 Total Score MyChart  2 (Minimal depression)  3 (Minimal depression) 9 (Mild depression) 8 (Mild depression) 4 (Minimal depression)   PHQ-9 Total Score 1 2 1 3  9  8  4        Patient-reported     GAD7:       12/7/2017     8:08 AM 6/22/2021    12:59 PM 6/27/2024     4:48 PM 1/21/2025     2:38 PM 2/4/2025    12:22 PM   GISEL-7 SCORE   Total Score  3 (minimal anxiety)  17 (severe anxiety) 15 (severe anxiety)   Total Score 1 3 0 17  15        Patient-reported     CAGE-AID:       6/22/2021     1:20 PM 1/21/2025     2:38 PM   CAGE-AID Total Score   Total Score  0    Total Score MyChart  0 (A total score of 2 or greater is considered clinically significant)       Information is confidential and restricted. Go to Review Flowsheets to unlock data.    Patient-reported     [place] Suicide Severity Rating Scale (Lifetime/Recent)      7/3/2024     3:44 PM 12/10/2024    12:21 PM 1/8/2025     7:00 PM 1/15/2025     3:16 AM 1/16/2025     9:00 AM 1/22/2025     9:33 AM   [place] Suicide Severity Rating (Lifetime/Recent)   Q1 Wished to be Dead (Past Month) 0-->no 0-->no 0-->no 0-->no 0-->no    Q2 Suicidal Thoughts (Past Month) 0-->no 0-->no 0-->no 0-->no 0-->no    Q6 Suicide Behavior (Lifetime) 0-->no 0-->no 0-->no 0-->no 0-->no    Level of Risk per Screen no risks indicated no risks indicated no risks indicated no risks indicated no risks indicated    1. Wish to be Dead (Lifetime)      N   2. Non-Specific Active Suicidal Thoughts (Lifetime)      N   Actual Attempt (Lifetime)      N   Has subject engaged in non-suicidal self-injurious behavior? (Lifetime)      N   Interrupted Attempts (Lifetime)      N   Aborted or Self-Interrupted Attempt (Lifetime)      N   Preparatory Acts or Behavior (Lifetime)      N   Calculated C-SSRS Risk Score (Lifetime/Recent)      No Risk Indicated        Reason for Visit/Presenting Concern:  Anxiety    Current Stressors / Issues:     Partner went back to work since  last session. Staying busy, had 6 week appointment, going on walks. Increased Lexapro this week.  Pt thinking about going back to work, worried about milk supply and potentially thinking about combo feeding. Stress with work     Therapeutic Interventions:  Cognitive Behavioral Therapy (CBT): Provided psycho-education on cognitive distortions., Identified behavioral changes that can be made to move towards treatment goals. , Assisted patient in developing coping strategies (e.g. more physical exercise, less internal focus, increase social involvement, more assertiveness, etc.)., and Reinforced successes reported by patient since last appointment.  Psycho-education: Provided psycho-education about patient's behavioral health condition and symptoms. Explained and reviewed treatment options.    Response to treatment interventions:   Patient was receptive to interventions utilized.  Patient was engaged in the therapy process.       Progress on Treatment Objective(s) / Homework:   Stable - ACTION (Actively working towards change); Intervened by reinforcing change plan / affirming steps taken     Medication Review:   No changes to current psychiatric medication(s)     Medication Compliance:   Yes     Chemical Use Review:  Substance Use: Chemical use reviewed, no active concerns identified      Tobacco Use: No current tobacco use.       Assessment: Current Emotional / Mental Status (status of significant symptoms):    Risk status (Self / Other harm or suicidal ideation)   Patient denies a history of suicidal ideation, suicide attempts, self-injurious behavior, homicidal ideation, homicidal behavior, and and other safety concerns   Patient denies current fears or concerns for personal safety.   Patient denies current or recent suicidal ideation or behaviors.   Patient denies current or recent homicidal ideation or behaviors.   Patient denies current or recent self injurious behavior or ideation.   Patient denies other safety  concerns.   Recommended that patient call 911 or go to the local ED should there be a change in any of these risk factors      ASSESSMENT:   Mental Status:     Appearance:   Appropriate    Eye Contact:   Good    Psychomotor Behavior: Normal    Attitude:   Interested Friendly   Orientation:   All   Speech Rate / Production: Normal    Volume:   Normal    Mood:    Normal   Affect:    Appropriate    Thought Content:  Clear    Thought Form:  Coherent  Logical    Insight:    Good          Diagnoses:   Adjustment Disorders  309.24 (F43.22) With anxiety     Collateral Reports Completed:   Not Applicable       Plan: (Homework, other):   Patient was provided No indications of CD issues  Patient was given information about behavioral services and encouraged to schedule a follow up appointment with the clinic [facility] in 2 weeks.         DEB Santana     _____________________________________________________________________________________________________________________________________                                              Individual Treatment Plan    Patient's Name: Phoebe Morris   YOB: 2000  Date of Creation: 2/20/25  Date Treatment Plan Last Reviewed/Revised: NA    DSM5 Diagnoses: Adjustment Disorders  309.24 (F43.22) With anxiety  Psychosocial / Contextual Factors:  Postpartum  PROMIS (reviewed every 90 days):   The following assessments were completed by patient for this visit:  PHQ9:       12/7/2017     8:08 AM 6/22/2021    12:53 PM 6/27/2024     4:48 PM 1/21/2025     2:32 PM 1/30/2025     7:48 PM 2/4/2025    12:21 PM 3/3/2025    10:57 AM   PHQ-9 SCORE   PHQ-9 Total Score MyChart  2 (Minimal depression)  3 (Minimal depression) 9 (Mild depression) 8 (Mild depression) 4 (Minimal depression)   PHQ-9 Total Score 1 2 1 3  9  8  4        Patient-reported     GAD7:       12/7/2017     8:08 AM 6/22/2021    12:59 PM 6/27/2024     4:48 PM 1/21/2025     2:38 PM 2/4/2025    12:22 PM   GISEL-7 SCORE   Total  Score  3 (minimal anxiety)  17 (severe anxiety) 15 (severe anxiety)   Total Score 1 3 0 17  15        Patient-reported     CAGE-AID:       6/22/2021     1:20 PM 1/21/2025     2:38 PM   CAGE-AID Total Score   Total Score  0    Total Score MyChart  0 (A total score of 2 or greater is considered clinically significant)       Information is confidential and restricted. Go to Review Flowsheets to unlock data.    Patient-reported     PROMIS 10-Global Health (all questions and answers displayed):       9/6/2023     1:05 PM 1/7/2024    11:12 AM 4/24/2024    10:13 AM 1/30/2025     7:59 PM 2/6/2025    10:45 AM   PROMIS 10   In general, would you say your health is: Excellent Very good Excellent Very good Very good   In general, would you say your quality of life is: Excellent Very good Excellent Very good Very good   In general, how would you rate your physical health? Excellent Very good Excellent Very good Very good   In general, how would you rate your mental health, including your mood and your ability to think? Excellent Very good Very good Good Good   In general, how would you rate your satisfaction with your social activities and relationships? Excellent Very good Very good Very good Good   In general, please rate how well you carry out your usual social activities and roles Excellent Very good Very good Good Good   To what extent are you able to carry out your everyday physical activities such as walking, climbing stairs, carrying groceries, or moving a chair? Completely Completely Completely Completely Completely   In the past 7 days, how often have you been bothered by emotional problems such as feeling anxious, depressed, or irritable? Sometimes Rarely Sometimes Always Often   In the past 7 days, how would you rate your fatigue on average? None None Mild Mild Moderate   In the past 7 days, how would you rate your pain on average, where 0 means no pain, and 10 means worst imaginable pain? 0 1 0 2 0   In general,  would you say your health is: 5 4 5 4 4   In general, would you say your quality of life is: 5 4 5 4 4   In general, how would you rate your physical health? 5 4 5 4 4   In general, how would you rate your mental health, including your mood and your ability to think? 5 4 4 3 3   In general, how would you rate your satisfaction with your social activities and relationships? 5 4 4 4 3   In general, please rate how well you carry out your usual social activities and roles. (This includes activities at home, at work and in your community, and responsibilities as a parent, child, spouse, employee, friend, etc.) 5 4 4 3 3   To what extent are you able to carry out your everyday physical activities such as walking, climbing stairs, carrying groceries, or moving a chair? 5 5 5 5 5   In the past 7 days, how often have you been bothered by emotional problems such as feeling anxious, depressed, or irritable? 3 2 3 5 4   In the past 7 days, how would you rate your fatigue on average? 1 1 2 2 3   In the past 7 days, how would you rate your pain on average, where 0 means no pain, and 10 means worst imaginable pain? 0 1 0 2 0   Global Mental Health Score 18 16 16 12  12    Global Physical Health Score 20 18 19 17  17    PROMIS TOTAL - SUBSCORES 38 34 35 29  29        Patient-reported     [place] Suicide Severity Rating Scale (Lifetime/Recent)      7/3/2024     3:44 PM 12/10/2024    12:21 PM 1/8/2025     7:00 PM 1/15/2025     3:16 AM 1/16/2025     9:00 AM 1/22/2025     9:33 AM   [place] Suicide Severity Rating (Lifetime/Recent)   Q1 Wished to be Dead (Past Month) 0-->no 0-->no 0-->no 0-->no 0-->no    Q2 Suicidal Thoughts (Past Month) 0-->no 0-->no 0-->no 0-->no 0-->no    Q6 Suicide Behavior (Lifetime) 0-->no 0-->no 0-->no 0-->no 0-->no    Level of Risk per Screen no risks indicated no risks indicated no risks indicated no risks indicated no risks indicated    1. Wish to be Dead (Lifetime)      N   2. Non-Specific Active Suicidal  Thoughts (Lifetime)      N   Actual Attempt (Lifetime)      N   Has subject engaged in non-suicidal self-injurious behavior? (Lifetime)      N   Interrupted Attempts (Lifetime)      N   Aborted or Self-Interrupted Attempt (Lifetime)      N   Preparatory Acts or Behavior (Lifetime)      N   Calculated C-SSRS Risk Score (Lifetime/Recent)      No Risk Indicated        Referral / Collaboration:  Referral to another professional/service is not indicated at this time..    Anticipated number of session for this episode of care:  9-12 sessions  Anticipation frequency of session: Biweekly  Anticipated Duration of each session: 16-37 minutes  Treatment plan will be reviewed in 90 days or when goals have been changed.       MeasurableTreatment Goal(s) related to diagnosis / functional impairment(s)  Goal 1: Patient will improve management of anxiety symptoms as evidenced by reduced frequency, intensity and duration of anxiety symptoms. learn and implement coping skills that result in a reduction of anxiety and nervousness. recognize contributing factors of anxiety and identify ways to intervene.    I will know I've met my goal when I am able to adapt to changes in routine without causing increased stress, anxiety and worry.      Status: New - Date: 2/20/25      Intervention(s)  [facility] will Cognitive Behavioral Therapy (CBT): Provide psycho-education on cognitive distortions., Identify behavioral changes that can be made to move towards treatment goals. , Assist patient in developing coping strategies (e.g. more physical exercise, less internal focus, increase social involvement, more assertiveness, etc.)., and Reinforce successes reported by patient since last appointment.  Acceptance and Commitment Therapy (ACT): Utilize mindfulness strategies to help the patient become more able to be present to difficult aspects of their experience and make decisions consistent with their values. Work with patient to practice acceptance:  making room for painful feelings, urges and sensations, and allowing them to come and go without a struggle.         Patient has reviewed and agreed to the above plan.     Written by  DEB Santana

## 2025-03-11 ENCOUNTER — MEDICAL CORRESPONDENCE (OUTPATIENT)
Dept: HEALTH INFORMATION MANAGEMENT | Facility: CLINIC | Age: 25
End: 2025-03-11
Payer: COMMERCIAL

## 2025-03-18 NOTE — PROGRESS NOTES
[facility]: Integrated Behavioral Health    Behavioral Health Clinician Progress Note   Mental Health & Addiction Services      2025    Patient Name: Phoebe Morris       Service Type:  Individual   Service Location:  MyChart / Email (patient reached)   Visit Start Time: 10:09 AM  Visit End Time:  10:31 AM   Session Length: 16 - 37    Attendees: Client   Service Modality: Video Visit:      Provider verified identity through the following two step process.  Patient provided:  Patient  and Patient address    Telemedicine Visit: The patient's condition can be safely assessed and treated via synchronous audio and visual telemedicine encounter.      Reason for Telemedicine Visit: Patient has requested telehealth visit    Originating Site (Patient Location): Patient's home    Distant Site (Provider Location[place] [facility] [place]    Consent:  The patient/guardian has verbally consented to: the potential risks and benefits of telemedicine (video visit) versus in person care; bill my insurance or make self-payment for services provided; and responsibility for payment of non-covered services.     Patient would like the video invitation sent by:  My Chart    Mode of Communication:  Video Conference [facility]    Distant Location (Provider):  On-site    As the provider I attest to compliance with applicable laws and regulations related to telemedicine.   Visit number: 6    [facility] Visit Activities (Refresh list every visit): [facility] Only     Date of Brief Diagnostic Assessment : 25  Treatment Plan Review Date: NA      DATA:    Extended Session (60+ minutes): No   Interactive Complexity: No   Crisis: No   [facility] Patient: No     Assessments completed prior to visit:   The following assessments were completed by patient for this visit:  PHQ9:       2017     8:08 AM 2021    12:53 PM 2024     4:48 PM 2025     2:32 PM 2025     7:48 PM 2025    12:21 PM  3/3/2025    10:57 AM   PHQ-9 SCORE   PHQ-9 Total Score MyChart  2 (Minimal depression)  3 (Minimal depression) 9 (Mild depression) 8 (Mild depression) 4 (Minimal depression)   PHQ-9 Total Score 1 2 1 3  9  8  4        Patient-reported     GAD7:       12/7/2017     8:08 AM 6/22/2021    12:59 PM 6/27/2024     4:48 PM 1/21/2025     2:38 PM 2/4/2025    12:22 PM   GISEL-7 SCORE   Total Score  3 (minimal anxiety)  17 (severe anxiety) 15 (severe anxiety)   Total Score 1 3 0 17  15        Patient-reported     CAGE-AID:       6/22/2021     1:20 PM 1/21/2025     2:38 PM   CAGE-AID Total Score   Total Score  0    Total Score MyChart  0 (A total score of 2 or greater is considered clinically significant)       Information is confidential and restricted. Go to Review Flowsheets to unlock data.    Patient-reported     [place] Suicide Severity Rating Scale (Lifetime/Recent)      7/3/2024     3:44 PM 12/10/2024    12:21 PM 1/8/2025     7:00 PM 1/15/2025     3:16 AM 1/16/2025     9:00 AM 1/22/2025     9:33 AM   [place] Suicide Severity Rating (Lifetime/Recent)   Q1 Wished to be Dead (Past Month) 0-->no 0-->no 0-->no 0-->no 0-->no    Q2 Suicidal Thoughts (Past Month) 0-->no 0-->no 0-->no 0-->no 0-->no    Q6 Suicide Behavior (Lifetime) 0-->no 0-->no 0-->no 0-->no 0-->no    Level of Risk per Screen no risks indicated no risks indicated no risks indicated no risks indicated no risks indicated    1. Wish to be Dead (Lifetime)      N   2. Non-Specific Active Suicidal Thoughts (Lifetime)      N   Actual Attempt (Lifetime)      N   Has subject engaged in non-suicidal self-injurious behavior? (Lifetime)      N   Interrupted Attempts (Lifetime)      N   Aborted or Self-Interrupted Attempt (Lifetime)      N   Preparatory Acts or Behavior (Lifetime)      N   Calculated C-SSRS Risk Score (Lifetime/Recent)      No Risk Indicated        Reason for Visit/Presenting Concern:  Anxiety    Current Stressors / Issues:       Pt reports that she has been  doing well, mentally preparing for going back to work and will need to start .  Mood has been managed with the meds, doesn't feel on edge.  Still connecting with moms at the mom's group.  She is figuring out pumping and feeding, sleep has been going well.      Therapeutic Interventions:  Cognitive Behavioral Therapy (CBT): Provided psycho-education on cognitive distortions., Identified behavioral changes that can be made to move towards treatment goals. , Assisted patient in developing coping strategies (e.g. more physical exercise, less internal focus, increase social involvement, more assertiveness, etc.)., and Reinforced successes reported by patient since last appointment.  Psycho-education: Provided psycho-education about patient's behavioral health condition and symptoms. Explained and reviewed treatment options.    Response to treatment interventions:   Patient was receptive to interventions utilized.  Patient was engaged in the therapy process.       Progress on Treatment Objective(s) / Homework:   Stable - ACTION (Actively working towards change); Intervened by reinforcing change plan / affirming steps taken     Medication Review:   No changes to current psychiatric medication(s)     Medication Compliance:   Yes     Chemical Use Review:  Substance Use: Chemical use reviewed, no active concerns identified      Tobacco Use: No current tobacco use.       Assessment: Current Emotional / Mental Status (status of significant symptoms):    Risk status (Self / Other harm or suicidal ideation)   Patient denies a history of suicidal ideation, suicide attempts, self-injurious behavior, homicidal ideation, homicidal behavior, and and other safety concerns   Patient denies current fears or concerns for personal safety.   Patient denies current or recent suicidal ideation or behaviors.   Patient denies current or recent homicidal ideation or behaviors.   Patient denies current or recent self injurious behavior or  ideation.   Patient denies other safety concerns.   Recommended that patient call 911 or go to the local ED should there be a change in any of these risk factors      ASSESSMENT:   Mental Status:     Appearance:   Appropriate    Eye Contact:   Good    Psychomotor Behavior: Normal    Attitude:   Interested Friendly   Orientation:   All   Speech Rate / Production: Normal    Volume:   Normal    Mood:    Normal   Affect:    Appropriate    Thought Content:  Clear    Thought Form:  Coherent  Logical    Insight:    Good          Diagnoses:   Adjustment Disorders  309.24 (F43.22) With anxiety     Collateral Reports Completed:   Not Applicable       Plan: (Homework, other):   Patient was provided No indications of CD issues  Patient was given information about behavioral services and encouraged to schedule a follow up appointment with the clinic [facility] [place] in 2 weeks.         DEB Santana     _____________________________________________________________________________________________________________________________________                                              Individual Treatment Plan    Patient's Name: Phoebe Morris   YOB: 2000  Date of Creation: 2/20/25  Date Treatment Plan Last Reviewed/Revised: NA    DSM5 Diagnoses: Adjustment Disorders  309.24 (F43.22) With anxiety  Psychosocial / Contextual Factors:  Postpartum  PROMIS (reviewed every 90 days):   The following assessments were completed by patient for this visit:  PHQ9:       12/7/2017     8:08 AM 6/22/2021    12:53 PM 6/27/2024     4:48 PM 1/21/2025     2:32 PM 1/30/2025     7:48 PM 2/4/2025    12:21 PM 3/3/2025    10:57 AM   PHQ-9 SCORE   PHQ-9 Total Score MyChart  2 (Minimal depression)  3 (Minimal depression) 9 (Mild depression) 8 (Mild depression) 4 (Minimal depression)   PHQ-9 Total Score 1 2 1 3  9  8  4        Patient-reported     GAD7:       12/7/2017     8:08 AM 6/22/2021    12:59 PM 6/27/2024     4:48 PM 1/21/2025     2:38 PM  2/4/2025    12:22 PM   GISEL-7 SCORE   Total Score  3 (minimal anxiety)  17 (severe anxiety) 15 (severe anxiety)   Total Score 1 3 0 17  15        Patient-reported     CAGE-AID:       6/22/2021     1:20 PM 1/21/2025     2:38 PM   CAGE-AID Total Score   Total Score  0    Total Score MyChart  0 (A total score of 2 or greater is considered clinically significant)       Information is confidential and restricted. Go to Review Flowsheets to unlock data.    Patient-reported     PROMIS 10-Global Health (all questions and answers displayed):       9/6/2023     1:05 PM 1/7/2024    11:12 AM 4/24/2024    10:13 AM 1/30/2025     7:59 PM 2/6/2025    10:45 AM   PROMIS 10   In general, would you say your health is: Excellent Very good Excellent Very good Very good   In general, would you say your quality of life is: Excellent Very good Excellent Very good Very good   In general, how would you rate your physical health? Excellent Very good Excellent Very good Very good   In general, how would you rate your mental health, including your mood and your ability to think? Excellent Very good Very good Good Good   In general, how would you rate your satisfaction with your social activities and relationships? Excellent Very good Very good Very good Good   In general, please rate how well you carry out your usual social activities and roles Excellent Very good Very good Good Good   To what extent are you able to carry out your everyday physical activities such as walking, climbing stairs, carrying groceries, or moving a chair? Completely Completely Completely Completely Completely   In the past 7 days, how often have you been bothered by emotional problems such as feeling anxious, depressed, or irritable? Sometimes Rarely Sometimes Always Often   In the past 7 days, how would you rate your fatigue on average? None None Mild Mild Moderate   In the past 7 days, how would you rate your pain on average, where 0 means no pain, and 10 means worst  imaginable pain? 0 1 0 2 0   In general, would you say your health is: 5 4 5 4 4   In general, would you say your quality of life is: 5 4 5 4 4   In general, how would you rate your physical health? 5 4 5 4 4   In general, how would you rate your mental health, including your mood and your ability to think? 5 4 4 3 3   In general, how would you rate your satisfaction with your social activities and relationships? 5 4 4 4 3   In general, please rate how well you carry out your usual social activities and roles. (This includes activities at home, at work and in your community, and responsibilities as a parent, child, spouse, employee, friend, etc.) 5 4 4 3 3   To what extent are you able to carry out your everyday physical activities such as walking, climbing stairs, carrying groceries, or moving a chair? 5 5 5 5 5   In the past 7 days, how often have you been bothered by emotional problems such as feeling anxious, depressed, or irritable? 3 2 3 5 4   In the past 7 days, how would you rate your fatigue on average? 1 1 2 2 3   In the past 7 days, how would you rate your pain on average, where 0 means no pain, and 10 means worst imaginable pain? 0 1 0 2 0   Global Mental Health Score 18 16 16 12  12    Global Physical Health Score 20 18 19 17  17    PROMIS TOTAL - SUBSCORES 38 34 35 29  29        Patient-reported     [place] Suicide Severity Rating Scale (Lifetime/Recent)      7/3/2024     3:44 PM 12/10/2024    12:21 PM 1/8/2025     7:00 PM 1/15/2025     3:16 AM 1/16/2025     9:00 AM 1/22/2025     9:33 AM   [place] Suicide Severity Rating (Lifetime/Recent)   Q1 Wished to be Dead (Past Month) 0-->no 0-->no 0-->no 0-->no 0-->no    Q2 Suicidal Thoughts (Past Month) 0-->no 0-->no 0-->no 0-->no 0-->no    Q6 Suicide Behavior (Lifetime) 0-->no 0-->no 0-->no 0-->no 0-->no    Level of Risk per Screen no risks indicated no risks indicated no risks indicated no risks indicated no risks indicated    1. Wish to be Dead (Lifetime)       N   2. Non-Specific Active Suicidal Thoughts (Lifetime)      N   Actual Attempt (Lifetime)      N   Has subject engaged in non-suicidal self-injurious behavior? (Lifetime)      N   Interrupted Attempts (Lifetime)      N   Aborted or Self-Interrupted Attempt (Lifetime)      N   Preparatory Acts or Behavior (Lifetime)      N   Calculated C-SSRS Risk Score (Lifetime/Recent)      No Risk Indicated        Referral / Collaboration:  Referral to another professional/service is not indicated at this time..    Anticipated number of session for this episode of care:  9-12 sessions  Anticipation frequency of session: Biweekly  Anticipated Duration of each session: 16-37 minutes  Treatment plan will be reviewed in 90 days or when goals have been changed.       MeasurableTreatment Goal(s) related to diagnosis / functional impairment(s)  Goal 1: Patient will improve management of anxiety symptoms as evidenced by reduced frequency, intensity and duration of anxiety symptoms. learn and implement coping skills that result in a reduction of anxiety and nervousness. recognize contributing factors of anxiety and identify ways to intervene.    I will know I've met my goal when I am able to adapt to changes in routine without causing increased stress, anxiety and worry.      Status: New - Date: 2/20/25      Intervention(s)  [facility] will Cognitive Behavioral Therapy (CBT): Provide psycho-education on cognitive distortions., Identify behavioral changes that can be made to move towards treatment goals. , Assist patient in developing coping strategies (e.g. more physical exercise, less internal focus, increase social involvement, more assertiveness, etc.)., and Reinforce successes reported by patient since last appointment.  Acceptance and Commitment Therapy (ACT): Utilize mindfulness strategies to help the patient become more able to be present to difficult aspects of their experience and make decisions consistent with their values. Work  with patient to practice acceptance: making room for painful feelings, urges and sensations, and allowing them to come and go without a struggle.         Patient has reviewed and agreed to the above plan.     Written by  DEB Santana

## 2025-03-19 ENCOUNTER — VIRTUAL VISIT (OUTPATIENT)
Dept: BEHAVIORAL HEALTH | Facility: CLINIC | Age: 25
End: 2025-03-19
Payer: COMMERCIAL

## 2025-03-19 DIAGNOSIS — F43.22 ADJUSTMENT DISORDER WITH ANXIETY: Primary | ICD-10-CM

## 2025-03-19 PROCEDURE — 90832 PSYTX W PT 30 MINUTES: CPT | Mod: 95 | Performed by: SOCIAL WORKER

## 2025-03-26 ENCOUNTER — OFFICE VISIT (OUTPATIENT)
Dept: OBGYN | Facility: CLINIC | Age: 25
End: 2025-03-26
Payer: COMMERCIAL

## 2025-03-26 VITALS
SYSTOLIC BLOOD PRESSURE: 131 MMHG | BODY MASS INDEX: 37.32 KG/M2 | OXYGEN SATURATION: 100 % | WEIGHT: 217.4 LBS | HEART RATE: 89 BPM | DIASTOLIC BLOOD PRESSURE: 77 MMHG

## 2025-03-26 DIAGNOSIS — Z30.430 ENCOUNTER FOR INSERTION OF INTRAUTERINE CONTRACEPTIVE DEVICE: Primary | ICD-10-CM

## 2025-03-26 DIAGNOSIS — Z30.430 ENCOUNTER FOR INSERTION OF PARAGARD IUD: ICD-10-CM

## 2025-03-26 LAB — HCG UR QL: NEGATIVE

## 2025-03-26 PROCEDURE — 81025 URINE PREGNANCY TEST: CPT

## 2025-03-26 PROCEDURE — 3078F DIAST BP <80 MM HG: CPT

## 2025-03-26 PROCEDURE — 58300 INSERT INTRAUTERINE DEVICE: CPT

## 2025-03-26 PROCEDURE — 3075F SYST BP GE 130 - 139MM HG: CPT

## 2025-03-26 RX ORDER — COPPER 313.4 MG/1
1 INTRAUTERINE DEVICE INTRAUTERINE SEE ADMIN INSTRUCTIONS
Status: ACTIVE | COMMUNITY
Start: 2025-03-26

## 2025-03-26 RX ORDER — COPPER 313.4 MG/1
1 INTRAUTERINE DEVICE INTRAUTERINE ONCE
COMMUNITY

## 2025-03-26 RX ADMIN — COPPER 1 EACH: 313.4 INTRAUTERINE DEVICE INTRAUTERINE at 15:02

## 2025-03-26 NOTE — PROGRESS NOTES
IUD Insertion:  CONSULT:    Is a pregnancy test required: Yes.  Was it positive or negative?  Negative  Was a consent obtained?  Yes    Subjective: Phoebe Morris is a 24 year old  presents for IUD and desires Paragard type IUD.    Patient has been given the opportunity to ask questions about all forms of birth control, including all options appropriate for Phoebe Morris. Discussed that no method of birth control, except abstinence is 100% effective against pregnancy or sexually transmitted infection.     Phoebe Morris understands she may have the IUD removed at any time. IUD should be removed by a health care provider.    The entire insertion procedure was reviewed with the patient, including care after placement.    Patient's last menstrual period was 2025. No allergy to betadine or shellfish. Patient declines STD screening  hCG Urine Qualitative   Date Value Ref Range Status   2025 Negative Negative Final     Comment:     This test is for screening purposes.  Results should be interpreted along with the clinical picture.  Confirmation testing is available if warranted by ordering [id number], HCG Quantitative Pregnancy.         /77 (Patient Position: Sitting)   Pulse 89   Wt 98.6 kg (217 lb 6.4 oz)   LMP 2025   SpO2 100%   Breastfeeding Yes   BMI 37.32 kg/m      Pelvic Exam:   EG/BUS: normal genital architecture without lesions, erythema or abnormal secretions.   Vagina: moist, pink, rugae with physiologic discharge and secretions  Cervix: multiparous no lesions and pink, moist, closed, without lesion or CMT  Uterus: antevertedposition, mobile, no pain  Adnexa: within normal limits and no masses, nodularity, tenderness    PROCEDURE NOTE: -- IUD Insertion    Reason for Insertion: contraception    Premedicated with ibuprofen. Paracervical block performed.  Under sterile technique, cervix was visualized with speculum and prepped with Betadine solution swab x 3. Tenaculum  was placed for stability. The uterus was gently straightened and sounded to 7.0 cm. IUD prepared for placement, and IUD inserted according to 's instructions without difficulty or significant resitance, and deployed at the fundus. The strings were visualized and trimmed to 2.0 cm from the external os. Tenaculum was removed and hemostasis noted. Speculum removed.  Patient tolerated procedure well.    EBL: minimal    Complications: none    ASSESSMENT:     ICD-10-CM    1. Pregnancy examination or test, pregnancy unconfirmed  Z32.00 HCG qualitative urine     HCG qualitative urine           PLAN:    Given 's handouts, including when to have IUD removed, list of danger s/sx, side effects and follow up recommended. Encouraged condom use for prevention of STD. Back up contraception advised for 7 days if progestin method. Advised to call for any fever, for prolonged or severe pain or bleeding, abnormal vaginal discharge, or unable to palpate strings. She was advised to use pain medications (ibuprofen) as needed for mild to moderate pain. Advised to follow-up in clinic in 4-6 weeks for IUD string check if unable to find strings or as directed by provider.     BERNICE Pepper CNP

## 2025-03-26 NOTE — PATIENT INSTRUCTIONS
After having an IUD placed it is normal to have spotting and cramping, you can take Ibuprofen or Tylenol according to the instructions on the bottle and use a heating pad to the lower abdomen as needed.     Please avoid placing anything in the vagina (tampons, intercourse, etc) for 72 hours. The progesterone IUD takes 7 days to be effective for pregnancy prevention so please use condoms for back up contraception if you have intercourse before the 7 day cortez but the copper (ParaGard IUD) is effective the same day of placement.     Please call [phone] with any severe abdominal pain, heavy vaginal bleeding, fevers or foul smelling vaginal discharge.     You should check your IUD strings in 2 weeks by placing one or two fingers inside the vagina as high up as you can reach, you should be able to feel the IUD strings (which feel like fishing line), if you can't feel your strings or don't feel comfortable checking yourself you can call clinic to schedule an IUD check.

## 2025-04-01 ENCOUNTER — VIRTUAL VISIT (OUTPATIENT)
Dept: BEHAVIORAL HEALTH | Facility: CLINIC | Age: 25
End: 2025-04-01
Payer: COMMERCIAL

## 2025-04-01 DIAGNOSIS — F43.22 ADJUSTMENT DISORDER WITH ANXIETY: Primary | ICD-10-CM

## 2025-04-01 PROCEDURE — 90832 PSYTX W PT 30 MINUTES: CPT | Mod: 95 | Performed by: SOCIAL WORKER

## 2025-04-01 NOTE — PROGRESS NOTES
[facility]: Integrated Behavioral Health    Behavioral Health Clinician Progress Note   Mental Health & Addiction Services      2025    Patient Name: Phoebe Morris       Service Type:  Individual   Service Location:  MyChart / Email (patient reached)   Visit Start Time: 10:01 AM  Visit End Time:  10:34 AM   Session Length: 16 - 37    Attendees: Client   Service Modality: Video Visit:      Provider verified identity through the following two step process.  Patient provided:  Patient  and Patient address    Telemedicine Visit: The patient's condition can be safely assessed and treated via synchronous audio and visual telemedicine encounter.      Reason for Telemedicine Visit: Patient has requested telehealth visit    Originating Site (Patient Location): Patient's [place]    [place] [facility] [place]ent:  The patient/guardian has verbally consented to: the potential risks and benefits of telemedicine (video visit) versus in person care; bill my insurance or make self-payment for services provided; and responsibility for payment of non-covered services.     Patient would like the video invitation sent by:  My Chart    Mode of Communication:  [facility]    Distant Location (Provider):  On-site    As the provider I attest to compliance with applicable laws and regulations related to telemedicine.   Visit number: 7    [facility] Visit Activities (Refresh list every visit): [facility] Only     Date of Brief Diagnostic Assessment : 25  Treatment Plan Review Date: NA      DATA:    Extended Session (60+ minutes): No   Interactive Complexity: No   Crisis: No   [facility] Patient: No     Assessments completed prior to visit:   The following assessments were completed by patient for this visit:  PHQ9:       2017     8:08 AM 2021    12:53 PM 2024     4:48 PM 2025     2:32 PM 2025     7:48 PM 2025    12:21 PM  3/3/2025    10:57 AM   PHQ-9 SCORE   PHQ-9 Total Score MyChart  2 (Minimal depression)  3 (Minimal depression) 9 (Mild depression) 8 (Mild depression) 4 (Minimal depression)   PHQ-9 Total Score 1 2 1 3  9  8  4        Patient-reported     GAD7:       12/7/2017     8:08 AM 6/22/2021    12:59 PM 6/27/2024     4:48 PM 1/21/2025     2:38 PM 2/4/2025    12:22 PM   GISEL-7 SCORE   Total Score  3 (minimal anxiety)  17 (severe anxiety) 15 (severe anxiety)   Total Score 1 3 0 17  15        Patient-reported     CAGE-AID:       6/22/2021     1:20 PM 1/21/2025     2:38 PM   CAGE-AID Total Score   Total Score  0    Total Score MyChart  0 (A total score of 2 or greater is considered clinically significant)       Information is confidential and restricted. Go to Review Flowsheets to unlock data.    Patient-reported     [place] Suicide Severity Rating Scale (Lifetime/Recent)      7/3/2024     3:44 PM 12/10/2024    12:21 PM 1/8/2025     7:00 PM 1/15/2025     3:16 AM 1/16/2025     9:00 AM 1/22/2025     9:33 AM   [place] Suicide Severity Rating (Lifetime/Recent)   Q1 Wished to be Dead (Past Month) 0-->no 0-->no 0-->no 0-->no 0-->no    Q2 Suicidal Thoughts (Past Month) 0-->no 0-->no 0-->no 0-->no 0-->no    Q6 Suicide Behavior (Lifetime) 0-->no 0-->no 0-->no 0-->no 0-->no    Level of Risk per Screen no risks indicated no risks indicated no risks indicated no risks indicated no risks indicated    1. Wish to be Dead (Lifetime)      N   2. Non-Specific Active Suicidal Thoughts (Lifetime)      N   Actual Attempt (Lifetime)      N   Has subject engaged in non-suicidal self-injurious behavior? (Lifetime)      N   Interrupted Attempts (Lifetime)      N   Aborted or Self-Interrupted Attempt (Lifetime)      N   Preparatory Acts or Behavior (Lifetime)      N   Calculated C-SSRS Risk Score (Lifetime/Recent)      No Risk Indicated        Reason for Visit/Presenting Concern:  Anxiety    Current Stressors / Issues:     Baby started  this week  which went well.  Will start work next week and found out that she is walking into a stressful time at work. She is feeling positive about this and will be getting a promotion with more responsibilities and now will be salary and worried about work boundaries.  Feeling good about the Lexapro and the dose she is at. Pt reflected that she gets very defensive with partner.  Discussed having conversations with partner to help with conflict.      Therapeutic Interventions:  Cognitive Behavioral Therapy (CBT): Provided psycho-education on cognitive distortions., Identified behavioral changes that can be made to move towards treatment goals. , Assisted patient in developing coping strategies (e.g. more physical exercise, less internal focus, increase social involvement, more assertiveness, etc.)., and Reinforced successes reported by patient since last appointment.  Psycho-education: Provided psycho-education about patient's behavioral health condition and symptoms. Explained and reviewed treatment options.    Response to treatment interventions:   Patient was receptive to interventions utilized.  Patient was engaged in the therapy process.       Progress on Treatment Objective(s) / Homework:   Stable - ACTION (Actively working towards change); Intervened by reinforcing change plan / affirming steps taken     Medication Review:   No changes to current psychiatric medication(s)     Medication Compliance:   Yes     Chemical Use Review:  Substance Use: Chemical use reviewed, no active concerns identified      Tobacco Use: No current tobacco use.       Assessment: Current Emotional / Mental Status (status of significant symptoms):    Risk status (Self / Other harm or suicidal ideation)   Patient denies a history of suicidal ideation, suicide attempts, self-injurious behavior, homicidal ideation, homicidal behavior, and and other safety concerns   Patient denies current fears or concerns for personal safety.   Patient denies  current or recent suicidal ideation or behaviors.   Patient denies current or recent homicidal ideation or behaviors.   Patient denies current or recent self injurious behavior or ideation.   Patient denies other safety concerns.   Recommended that patient call 911 or go to the local ED should there be a change in any of these risk factors      ASSESSMENT:   Mental Status:     Appearance:   Appropriate    Eye Contact:   Good    Psychomotor Behavior: Normal    Attitude:   Interested Friendly   Orientation:   All   Speech Rate / Production: Normal    Volume:   Normal    Mood:    Normal   Affect:    Appropriate    Thought Content:  Clear    Thought Form:  Coherent  Logical    Insight:    Good          Diagnoses:   Adjustment Disorders  309.24 (F43.22) With anxiety     Collateral Reports Completed:   Not Applicable       Plan: (Homework, other):   Patient was provided No indications of CD issues  Patient was given information about behavioral services and encouraged to schedule a follow up appointment with the clinic [facility] in 2 weeks.         DEB Santana     _____________________________________________________________________________________________________________________________________                                              Individual Treatment Plan    Patient's Name: Phoebe Morris   YOB: 2000  Date of Creation: 2/20/25  Date Treatment Plan Last Reviewed/Revised: NA    DSM5 Diagnoses: Adjustment Disorders  309.24 (F43.22) With anxiety  Psychosocial / Contextual Factors:  Postpartum  PROMIS (reviewed every 90 days):   The following assessments were completed by patient for this visit:  PHQ9:       12/7/2017     8:08 AM 6/22/2021    12:53 PM 6/27/2024     4:48 PM 1/21/2025     2:32 PM 1/30/2025     7:48 PM 2/4/2025    12:21 PM 3/3/2025    10:57 AM   PHQ-9 SCORE   PHQ-9 Total Score MyChart  2 (Minimal depression)  3 (Minimal depression) 9 (Mild depression) 8 (Mild depression) 4 (Minimal  depression)   PHQ-9 Total Score 1 2 1 3  9  8  4        Patient-reported     GAD7:       12/7/2017     8:08 AM 6/22/2021    12:59 PM 6/27/2024     4:48 PM 1/21/2025     2:38 PM 2/4/2025    12:22 PM   GISEL-7 SCORE   Total Score  3 (minimal anxiety)  17 (severe anxiety) 15 (severe anxiety)   Total Score 1 3 0 17  15        Patient-reported     CAGE-AID:       6/22/2021     1:20 PM 1/21/2025     2:38 PM   CAGE-AID Total Score   Total Score  0    Total Score MyChart  0 (A total score of 2 or greater is considered clinically significant)       Information is confidential and restricted. Go to Review Flowsheets to unlock data.    Patient-reported     PROMIS 10-Global Health (all questions and answers displayed):       9/6/2023     1:05 PM 1/7/2024    11:12 AM 4/24/2024    10:13 AM 1/30/2025     7:59 PM 2/6/2025    10:45 AM   PROMIS 10   In general, would you say your health is: Excellent Very good Excellent Very good Very good   In general, would you say your quality of life is: Excellent Very good Excellent Very good Very good   In general, how would you rate your physical health? Excellent Very good Excellent Very good Very good   In general, how would you rate your mental health, including your mood and your ability to think? Excellent Very good Very good Good Good   In general, how would you rate your satisfaction with your social activities and relationships? Excellent Very good Very good Very good Good   In general, please rate how well you carry out your usual social activities and roles Excellent Very good Very good Good Good   To what extent are you able to carry out your everyday physical activities such as walking, climbing stairs, carrying groceries, or moving a chair? Completely Completely Completely Completely Completely   In the past 7 days, how often have you been bothered by emotional problems such as feeling anxious, depressed, or irritable? Sometimes Rarely Sometimes Always Often   In the past 7 days,  how would you rate your fatigue on average? None None Mild Mild Moderate   In the past 7 days, how would you rate your pain on average, where 0 means no pain, and 10 means worst imaginable pain? 0 1 0 2 0   In general, would you say your health is: 5 4 5 4 4   In general, would you say your quality of life is: 5 4 5 4 4   In general, how would you rate your physical health? 5 4 5 4 4   In general, how would you rate your mental health, including your mood and your ability to think? 5 4 4 3 3   In general, how would you rate your satisfaction with your social activities and relationships? 5 4 4 4 3   In general, please rate how well you carry out your usual social activities and roles. (This includes activities at home, at work and in your community, and responsibilities as a parent, child, spouse, employee, friend, etc.) 5 4 4 3 3   To what extent are you able to carry out your everyday physical activities such as walking, climbing stairs, carrying groceries, or moving a chair? 5 5 5 5 5   In the past 7 days, how often have you been bothered by emotional problems such as feeling anxious, depressed, or irritable? 3 2 3 5 4   In the past 7 days, how would you rate your fatigue on average? 1 1 2 2 3   In the past 7 days, how would you rate your pain on average, where 0 means no pain, and 10 means worst imaginable pain? 0 1 0 2 0   Global Mental Health Score 18 16 16 12  12    Global Physical Health Score 20 18 19 17  17    PROMIS TOTAL - SUBSCORES 38 34 35 29  29        Patient-reported     [place] Suicide Severity Rating Scale (Lifetime/Recent)      7/3/2024     3:44 PM 12/10/2024    12:21 PM 1/8/2025     7:00 PM 1/15/2025     3:16 AM 1/16/2025     9:00 AM 1/22/2025     9:33 AM   [place] Suicide Severity Rating (Lifetime/Recent)   Q1 Wished to be Dead (Past Month) 0-->no 0-->no 0-->no 0-->no 0-->no    Q2 Suicidal Thoughts (Past Month) 0-->no 0-->no 0-->no 0-->no 0-->no    Q6 Suicide Behavior (Lifetime) 0-->no 0-->no  0-->no 0-->no 0-->no    Level of Risk per Screen no risks indicated no risks indicated no risks indicated no risks indicated no risks indicated    1. Wish to be Dead (Lifetime)      N   2. Non-Specific Active Suicidal Thoughts (Lifetime)      N   Actual Attempt (Lifetime)      N   Has subject engaged in non-suicidal self-injurious behavior? (Lifetime)      N   Interrupted Attempts (Lifetime)      N   Aborted or Self-Interrupted Attempt (Lifetime)      N   Preparatory Acts or Behavior (Lifetime)      N   Calculated C-SSRS Risk Score (Lifetime/Recent)      No Risk Indicated        Referral / Collaboration:  Referral to another professional/service is not indicated at this time..    Anticipated number of session for this episode of care:  9-12 sessions  Anticipation frequency of session: Biweekly  Anticipated Duration of each session: 16-37 minutes  Treatment plan will be reviewed in 90 days or when goals have been changed.       MeasurableTreatment Goal(s) related to diagnosis / functional impairment(s)  Goal 1: Patient will improve management of anxiety symptoms as evidenced by reduced frequency, intensity and duration of anxiety symptoms. learn and implement coping skills that result in a reduction of anxiety and nervousness. recognize contributing factors of anxiety and identify ways to intervene.    I will know I've met my goal when I am able to adapt to changes in routine without causing increased stress, anxiety and worry.      Status: New - Date: 2/20/25      Intervention(s)  [facility] will Cognitive Behavioral Therapy (CBT): Provide psycho-education on cognitive distortions., Identify behavioral changes that can be made to move towards treatment goals. , Assist patient in developing coping strategies (e.g. more physical exercise, less internal focus, increase social involvement, more assertiveness, etc.)., and Reinforce successes reported by patient since last appointment.  Acceptance and Commitment Therapy (ACT):  Utilize mindfulness strategies to help the patient become more able to be present to difficult aspects of their experience and make decisions consistent with their values. Work with patient to practice acceptance: making room for painful feelings, urges and sensations, and allowing them to come and go without a struggle.         Patient has reviewed and agreed to the above plan.     Written by  DEB Santana

## 2025-05-08 ENCOUNTER — VIRTUAL VISIT (OUTPATIENT)
Dept: BEHAVIORAL HEALTH | Facility: CLINIC | Age: 25
End: 2025-05-08
Payer: COMMERCIAL

## 2025-05-08 DIAGNOSIS — F43.22 ADJUSTMENT DISORDER WITH ANXIETY: Primary | ICD-10-CM

## 2025-05-08 NOTE — PROGRESS NOTES
[facility]: Integrated Behavioral Health    Behavioral Health Clinician Progress Note   Mental Health & Addiction Services      May 5, 2025    Patient Name: Phoebe Morris       Service Type:  Individual   Service Location:  MyChart / Email (patient reached)   Visit Start Time: 1:31 PM   Visit End Time:   1:56 PM   Session Length: 16 - 37    Attendees: Client   Service Modality: Video Visit:      Provider verified identity through the following two step process.  Patient provided:  Patient  and Patient address    Telemedicine Visit: The patient's condition can be safely assessed and treated via synchronous audio and visual telemedicine encounter.      Reason for Telemedicine Visit: Patient has requested telehealth visit    Originating Site (Patient Location): Patient's home    Distant Site (Provider Location): [facility] [place]    Consent:  The patient/guardian has verbally consented to: the potential risks and benefits of telemedicine (video visit) versus in person care; bill my insurance or make self-payment for services provided; and responsibility for payment of non-covered services.     Patient would like the video invitation sent by:  My Chart    Mode of Communication:  Video Conference via Amwell    Distant Location (Provider):  On-site    As the provider I attest to compliance with applicable laws and regulations related to telemedicine.   Visit number: 8    [facility] Visit Activities (Refresh list every visit): [facility] Only     Date of Brief Diagnostic Assessment : 25  Treatment Plan Review Date: NA      DATA:    Extended Session (60+ minutes): No   Interactive Complexity: No   Crisis: No   [facility] Patient: No     Assessments completed prior to visit:   The following assessments were completed by patient for this visit:  PHQ9:       2017     8:08 AM 2021    12:53 PM 2024     4:48 PM 2025     2:32 PM 2025     7:48 PM 2025    12:21 PM  3/3/2025    10:57 AM   PHQ-9 SCORE   PHQ-9 Total Score MyChart  2 (Minimal depression)  3 (Minimal depression) 9 (Mild depression) 8 (Mild depression) 4 (Minimal depression)   PHQ-9 Total Score 1  2 1 3  9  8  4        Patient-reported    Data saved with a previous flowsheet row definition     GAD7:       12/7/2017     8:08 AM 6/22/2021    12:59 PM 6/27/2024     4:48 PM 1/21/2025     2:38 PM 2/4/2025    12:22 PM   GISEL-7 SCORE   Total Score  3 (minimal anxiety)  17 (severe anxiety) 15 (severe anxiety)   Total Score 1 3 0 17  15        Patient-reported     CAGE-AID:       6/22/2021     1:20 PM 1/21/2025     2:38 PM   CAGE-AID Total Score   Total Score  0    Total Score MyChart  0 (A total score of 2 or greater is considered clinically significant)       Information is confidential and restricted. Go to Review Flowsheets to unlock data.    Patient-reported     [place] Suicide Severity Rating Scale (Lifetime/Recent)      7/3/2024     3:44 PM 12/10/2024    12:21 PM 1/8/2025     7:00 PM 1/15/2025     3:16 AM 1/16/2025     9:00 AM 1/22/2025     9:33 AM   [place] Suicide Severity Rating (Lifetime/Recent)   Q1 Wished to be Dead (Past Month) 0-->no 0-->no 0-->no 0-->no 0-->no    Q2 Suicidal Thoughts (Past Month) 0-->no 0-->no 0-->no 0-->no 0-->no    Q6 Suicide Behavior (Lifetime) 0-->no 0-->no 0-->no 0-->no 0-->no    Level of Risk per Screen no risks indicated no risks indicated no risks indicated no risks indicated no risks indicated    1. Wish to be Dead (Lifetime)      N   2. Non-Specific Active Suicidal Thoughts (Lifetime)      N   Actual Attempt (Lifetime)      N   Has subject engaged in non-suicidal self-injurious behavior? (Lifetime)      N   Interrupted Attempts (Lifetime)      N   Aborted or Self-Interrupted Attempt (Lifetime)      N   Preparatory Acts or Behavior (Lifetime)      N   Calculated C-SSRS Risk Score (Lifetime/Recent)      No Risk Indicated        Reason for Visit/Presenting Concern:  Anxiety    Current  Stressors / Issues:     Work has been stressful,  has been going well. 4 month sleep regression, naps are hard. Has been able to keep work at work but is still thinking about work when at home.  At office 9 hours a day.  PT feels like her and  are more like roommates.  Pt has a hard time with in-laws which creates a rift between the two of them.  Discussed options of couple therapy.     Therapeutic Interventions:  Cognitive Behavioral Therapy (CBT): Provided psycho-education on cognitive distortions., Identified behavioral changes that can be made to move towards treatment goals. , Assisted patient in developing coping strategies (e.g. more physical exercise, less internal focus, increase social involvement, more assertiveness, etc.)., and Reinforced successes reported by patient since last appointment.  Psycho-education: Provided psycho-education about patient's behavioral health condition and symptoms. Explained and reviewed treatment options.    Response to treatment interventions:   Patient was receptive to interventions utilized.  Patient was engaged in the therapy process.       Progress on Treatment Objective(s) / Homework:   Stable - ACTION (Actively working towards change); Intervened by reinforcing change plan / affirming steps taken     Medication Review:   No changes to current psychiatric medication(s)     Medication Compliance:   Yes     Chemical Use Review:  Substance Use: Chemical use reviewed, no active concerns identified      Tobacco Use: No current tobacco use.       Assessment: Current Emotional / Mental Status (status of significant symptoms):    Risk status (Self / Other harm or suicidal ideation)   Patient denies a history of suicidal ideation, suicide attempts, self-injurious behavior, homicidal ideation, homicidal behavior, and and other safety concerns   Patient denies current fears or concerns for personal safety.   Patient denies current or recent suicidal ideation or  behaviors.   Patient denies current or recent homicidal ideation or behaviors.   Patient denies current or recent self injurious behavior or ideation.   Patient denies other safety concerns.   Recommended that patient call 911 or go to the local ED should there be a change in any of these risk factors      ASSESSMENT:   Mental Status:     Appearance:   Appropriate    Eye Contact:   Good    Psychomotor Behavior: Normal    Attitude:   Interested Friendly   Orientation:   All   Speech Rate / Production: Normal    Volume:   Normal    Mood:    Normal   Affect:    Appropriate    Thought Content:  Clear    Thought Form:  Coherent  Logical    Insight:    Good          Diagnoses:   Adjustment Disorders  309.24 (F43.22) With anxiety     Collateral Reports Completed:   Not Applicable       Plan: (Homework, other):   Patient was provided No indications of CD issues  Patient was given information about behavioral services and encouraged to schedule a follow up appointment with the clinic [facility] in 2 weeks.         DEB Santana     _____________________________________________________________________________________________________________________________________                                              Individual Treatment Plan    Patient's Name: Phoebe Morris   YOB: 2000  Date of Creation: 2/20/25  Date Treatment Plan Last Reviewed/Revised: JOSÉ MIGUEL    DSM5 Diagnoses: Adjustment Disorders  309.24 (F43.22) With anxiety  Psychosocial / Contextual Factors: Postpartum  PROMIS (reviewed every 90 days):   The following assessments were completed by patient for this visit:  PHQ9:       12/7/2017     8:08 AM 6/22/2021    12:53 PM 6/27/2024     4:48 PM 1/21/2025     2:32 PM 1/30/2025     7:48 PM 2/4/2025    12:21 PM 3/3/2025    10:57 AM   PHQ-9 SCORE   PHQ-9 Total Score MyChart  2 (Minimal depression)  3 (Minimal depression) 9 (Mild depression) 8 (Mild depression) 4 (Minimal depression)   PHQ-9 Total Score 1  2 1 3   9  8  4        Patient-reported    Data saved with a previous flowsheet row definition     GAD7:       12/7/2017     8:08 AM 6/22/2021    12:59 PM 6/27/2024     4:48 PM 1/21/2025     2:38 PM 2/4/2025    12:22 PM   GISEL-7 SCORE   Total Score  3 (minimal anxiety)  17 (severe anxiety) 15 (severe anxiety)   Total Score 1 3 0 17  15        Patient-reported     CAGE-AID:       6/22/2021     1:20 PM 1/21/2025     2:38 PM   CAGE-AID Total Score   Total Score  0    Total Score MyChart  0 (A total score of 2 or greater is considered clinically significant)       Information is confidential and restricted. Go to Review Flowsheets to unlock data.    Patient-reported     PROMIS 10-Global Health (all questions and answers displayed):       9/6/2023     1:05 PM 1/7/2024    11:12 AM 4/24/2024    10:13 AM 1/30/2025     7:59 PM 2/6/2025    10:45 AM 5/7/2025     1:56 PM   PROMIS 10   In general, would you say your health is: Excellent Very good Excellent Very good Very good Good   In general, would you say your quality of life is: Excellent Very good Excellent Very good Very good Very good   In general, how would you rate your physical health? Excellent Very good Excellent Very good Very good Good   In general, how would you rate your mental health, including your mood and your ability to think? Excellent Very good Very good Good Good Very good   In general, how would you rate your satisfaction with your social activities and relationships? Excellent Very good Very good Very good Good Very good   In general, please rate how well you carry out your usual social activities and roles Excellent Very good Very good Good Good Very good   To what extent are you able to carry out your everyday physical activities such as walking, climbing stairs, carrying groceries, or moving a chair? Completely Completely Completely Completely Completely Completely   In the past 7 days, how often have you been bothered by emotional problems such as feeling  anxious, depressed, or irritable? Sometimes Rarely Sometimes Always Often Rarely   In the past 7 days, how would you rate your fatigue on average? None None Mild Mild Moderate Mild   In the past 7 days, how would you rate your pain on average, where 0 means no pain, and 10 means worst imaginable pain? 0 1 0 2 0 1   In general, would you say your health is: 5 4 5 4 4 3   In general, would you say your quality of life is: 5 4 5 4 4 4   In general, how would you rate your physical health? 5 4 5 4 4 3   In general, how would you rate your mental health, including your mood and your ability to think? 5 4 4 3 3 4   In general, how would you rate your satisfaction with your social activities and relationships? 5 4 4 4 3 4   In general, please rate how well you carry out your usual social activities and roles. (This includes activities at home, at work and in your community, and responsibilities as a parent, child, spouse, employee, friend, etc.) 5 4 4 3 3 4   To what extent are you able to carry out your everyday physical activities such as walking, climbing stairs, carrying groceries, or moving a chair? 5 5 5 5 5 5   In the past 7 days, how often have you been bothered by emotional problems such as feeling anxious, depressed, or irritable? 3 2 3 5 4 2   In the past 7 days, how would you rate your fatigue on average? 1 1 2 2 3 2   In the past 7 days, how would you rate your pain on average, where 0 means no pain, and 10 means worst imaginable pain? 0 1 0 2 0 1   Global Mental Health Score 18 16 16 12  12  16    Global Physical Health Score 20 18 19 17  17  16    PROMIS TOTAL - SUBSCORES 38 34 35 29  29  32        Patient-reported     [place] Suicide Severity Rating Scale (Lifetime/Recent)      7/3/2024     3:44 PM 12/10/2024    12:21 PM 1/8/2025     7:00 PM 1/15/2025     3:16 AM 1/16/2025     9:00 AM 1/22/2025     9:33 AM   [place] Suicide Severity Rating (Lifetime/Recent)   Q1 Wished to be Dead (Past Month) 0-->no  0-->no 0-->no 0-->no 0-->no    Q2 Suicidal Thoughts (Past Month) 0-->no 0-->no 0-->no 0-->no 0-->no    Q6 Suicide Behavior (Lifetime) 0-->no 0-->no 0-->no 0-->no 0-->no    Level of Risk per Screen no risks indicated no risks indicated no risks indicated no risks indicated no risks indicated    1. Wish to be Dead (Lifetime)      N   2. Non-Specific Active Suicidal Thoughts (Lifetime)      N   Actual Attempt (Lifetime)      N   Has subject engaged in non-suicidal self-injurious behavior? (Lifetime)      N   Interrupted Attempts (Lifetime)      N   Aborted or Self-Interrupted Attempt (Lifetime)      N   Preparatory Acts or Behavior (Lifetime)      N   Calculated C-SSRS Risk Score (Lifetime/Recent)      No Risk Indicated        Referral / Collaboration:  Referral to another professional/service is not indicated at this time..    Anticipated number of session for this episode of care:  9-12 sessions  Anticipation frequency of session: Biweekly  Anticipated Duration of each session: 16-37 minutes  Treatment plan will be reviewed in 90 days or when goals have been changed.       MeasurableTreatment Goal(s) related to diagnosis / functional impairment(s)  Goal 1: Patient will improve management of anxiety symptoms as evidenced by reduced frequency, intensity and duration of anxiety symptoms. learn and implement coping skills that result in a reduction of anxiety and nervousness. recognize contributing factors of anxiety and identify ways to intervene.    I will know I've met my goal when I am able to adapt to changes in routine without causing increased stress, anxiety and worry.      Status: New - Date: 2/20/25     Intervention(s)  [facility] [place] will Cognitive Behavioral Therapy (CBT): Provide psycho-education on cognitive distortions., Identify behavioral changes that can be made to move towards treatment goals. , Assist patient in developing coping strategies (e.g. more physical exercise, less internal focus, increase social  involvement, more assertiveness, etc.)., and Reinforce successes reported by patient since last appointment.  Acceptance and Commitment Therapy (ACT): Utilize mindfulness strategies to help the patient become more able to be present to difficult aspects of their experience and make decisions consistent with their values. Work with patient to practice acceptance: making room for painful feelings, urges and sensations, and allowing them to come and go without a struggle.         Patient has reviewed and agreed to the above plan.     Written by  DEB Santana

## 2025-05-10 ENCOUNTER — RESULTS FOLLOW-UP (OUTPATIENT)
Dept: FAMILY MEDICINE | Facility: CLINIC | Age: 25
End: 2025-05-10

## 2025-05-11 ENCOUNTER — MYC MEDICAL ADVICE (OUTPATIENT)
Dept: FAMILY MEDICINE | Facility: CLINIC | Age: 25
End: 2025-05-11
Payer: COMMERCIAL

## 2025-05-12 ENCOUNTER — E-VISIT (OUTPATIENT)
Dept: FAMILY MEDICINE | Facility: CLINIC | Age: 25
End: 2025-05-12
Payer: COMMERCIAL

## 2025-05-12 DIAGNOSIS — L30.4 INTERTRIGO: Primary | ICD-10-CM

## 2025-05-12 RX ORDER — NYSTATIN 100000 U/G
CREAM TOPICAL 2 TIMES DAILY
Qty: 90 G | Refills: 0 | Status: SHIPPED | OUTPATIENT
Start: 2025-05-12

## 2025-06-03 ENCOUNTER — MYC MEDICAL ADVICE (OUTPATIENT)
Dept: OBGYN | Facility: CLINIC | Age: 25
End: 2025-06-03
Payer: COMMERCIAL

## 2025-06-03 NOTE — TELEPHONE ENCOUNTER
PAPERWORK REQUEST    Form received on: 06/03/2025  How was form received: Anni  Preferred Provider: Kailee Benz MD  Type of form: FMLA  Date needed by: 5-7 days  What to do with form once completed: asking pt  Where was form placed?: [facility] basket  Has an NI been signed? Not Applicable    Additional Notes:

## 2025-08-22 ENCOUNTER — E-VISIT (OUTPATIENT)
Dept: FAMILY MEDICINE | Facility: CLINIC | Age: 25
End: 2025-08-22
Payer: COMMERCIAL

## 2025-08-22 DIAGNOSIS — H92.09 OTALGIA, UNSPECIFIED LATERALITY: Primary | ICD-10-CM

## 2025-08-22 PROCEDURE — 99207 PR NON-BILLABLE SERV PER CHARTING: CPT | Performed by: PHYSICIAN ASSISTANT

## (undated) DEVICE — GLOVE PROTEXIS BLUE W/NEU-THERA 7.0  2D73EB70

## (undated) DEVICE — PREP CHLORAPREP 26ML TINTED ORANGE  260815

## (undated) DEVICE — PACK C-SECTION LF PL15OTA83B

## (undated) DEVICE — CATH TRAY FOLEY 16FR BARDEX W/DRAIN BAG STATLOCK 300316A

## (undated) DEVICE — SOL WATER IRRIG 1000ML BOTTLE 07139-09

## (undated) DEVICE — SU PDS II 0 CTX 60" Z990G

## (undated) DEVICE — STRAP KNEE/BODY 31143004

## (undated) DEVICE — SU MONOCRYL 4-0 PS-2 18" UND Y496G

## (undated) DEVICE — SU MONOCRYL 0 CT-1 36" Y346H

## (undated) DEVICE — ESU PENCIL W/SMOKE EVAC NEPTUNE STRYKER 0703-046-000

## (undated) DEVICE — GLOVE ESTEEM POWDER FREE SMT 6.5  2D72PT65

## (undated) DEVICE — SU VICRYL 0 CT-1 36" J346H

## (undated) DEVICE — SOL NACL 0.9% IRRIG 1000ML BOTTLE 07138-09

## (undated) DEVICE — SU PLAIN 2-0 CT 27" 853H

## (undated) DEVICE — STOCKING SLEEVE COMPRESSION CALF LG

## (undated) DEVICE — DRSG STERI STRIP 1/4X3" R1541

## (undated) RX ORDER — FENTANYL CITRATE 50 UG/ML
INJECTION, SOLUTION INTRAMUSCULAR; INTRAVENOUS
Status: DISPENSED
Start: 2025-01-16

## (undated) RX ORDER — BUPIVACAINE HYDROCHLORIDE 2.5 MG/ML
INJECTION, SOLUTION EPIDURAL; INFILTRATION; INTRACAUDAL
Status: DISPENSED
Start: 2025-01-16

## (undated) RX ORDER — MORPHINE SULFATE 1 MG/ML
INJECTION, SOLUTION EPIDURAL; INTRATHECAL; INTRAVENOUS
Status: DISPENSED
Start: 2025-01-16